# Patient Record
Sex: FEMALE | Race: OTHER | HISPANIC OR LATINO | URBAN - METROPOLITAN AREA
[De-identification: names, ages, dates, MRNs, and addresses within clinical notes are randomized per-mention and may not be internally consistent; named-entity substitution may affect disease eponyms.]

---

## 2021-03-01 ENCOUNTER — OUTPATIENT (OUTPATIENT)
Dept: OUTPATIENT SERVICES | Facility: HOSPITAL | Age: 61
LOS: 1 days | End: 2021-03-01
Payer: MEDICAID

## 2021-03-10 ENCOUNTER — INPATIENT (INPATIENT)
Facility: HOSPITAL | Age: 61
LOS: 4 days | Discharge: HOME HEALTH | DRG: 556 | End: 2021-03-15
Attending: PSYCHIATRY & NEUROLOGY | Admitting: PSYCHIATRY & NEUROLOGY
Payer: MEDICAID

## 2021-03-10 VITALS
TEMPERATURE: 98 F | HEIGHT: 63 IN | OXYGEN SATURATION: 98 % | RESPIRATION RATE: 18 BRPM | HEART RATE: 102 BPM | SYSTOLIC BLOOD PRESSURE: 169 MMHG | WEIGHT: 169.98 LBS | DIASTOLIC BLOOD PRESSURE: 79 MMHG

## 2021-03-10 LAB
A1C WITH ESTIMATED AVERAGE GLUCOSE RESULT: 5.9 % — HIGH (ref 4–5.6)
ALBUMIN SERPL ELPH-MCNC: 3.8 G/DL — SIGNIFICANT CHANGE UP (ref 3.3–5)
ALBUMIN SERPL ELPH-MCNC: 3.9 G/DL — SIGNIFICANT CHANGE UP (ref 3.3–5)
ALP SERPL-CCNC: 101 U/L — SIGNIFICANT CHANGE UP (ref 40–120)
ALP SERPL-CCNC: 97 U/L — SIGNIFICANT CHANGE UP (ref 40–120)
ALT FLD-CCNC: 18 U/L — SIGNIFICANT CHANGE UP (ref 10–45)
ALT FLD-CCNC: SIGNIFICANT CHANGE UP (ref 10–45)
ANION GAP SERPL CALC-SCNC: 7 MMOL/L — SIGNIFICANT CHANGE UP (ref 5–17)
ANION GAP SERPL CALC-SCNC: 9 MMOL/L — SIGNIFICANT CHANGE UP (ref 5–17)
AST SERPL-CCNC: 22 U/L — SIGNIFICANT CHANGE UP (ref 10–40)
AST SERPL-CCNC: SIGNIFICANT CHANGE UP (ref 10–40)
BILIRUB SERPL-MCNC: 0.2 MG/DL — SIGNIFICANT CHANGE UP (ref 0.2–1.2)
BILIRUB SERPL-MCNC: <0.2 MG/DL — SIGNIFICANT CHANGE UP (ref 0.2–1.2)
BLD GP AB SCN SERPL QL: NEGATIVE — SIGNIFICANT CHANGE UP
BUN SERPL-MCNC: 10 MG/DL — SIGNIFICANT CHANGE UP (ref 7–23)
BUN SERPL-MCNC: 9 MG/DL — SIGNIFICANT CHANGE UP (ref 7–23)
CALCIUM SERPL-MCNC: 8.9 MG/DL — SIGNIFICANT CHANGE UP (ref 8.4–10.5)
CALCIUM SERPL-MCNC: 9.2 MG/DL — SIGNIFICANT CHANGE UP (ref 8.4–10.5)
CHLORIDE SERPL-SCNC: 104 MMOL/L — SIGNIFICANT CHANGE UP (ref 96–108)
CHLORIDE SERPL-SCNC: 105 MMOL/L — SIGNIFICANT CHANGE UP (ref 96–108)
CHOLEST SERPL-MCNC: 171 MG/DL — SIGNIFICANT CHANGE UP
CO2 SERPL-SCNC: 24 MMOL/L — SIGNIFICANT CHANGE UP (ref 22–31)
CO2 SERPL-SCNC: 26 MMOL/L — SIGNIFICANT CHANGE UP (ref 22–31)
CREAT SERPL-MCNC: 0.57 MG/DL — SIGNIFICANT CHANGE UP (ref 0.5–1.3)
CREAT SERPL-MCNC: 0.59 MG/DL — SIGNIFICANT CHANGE UP (ref 0.5–1.3)
ESTIMATED AVERAGE GLUCOSE: 123 MG/DL — HIGH (ref 68–114)
GLUCOSE SERPL-MCNC: 144 MG/DL — HIGH (ref 70–99)
GLUCOSE SERPL-MCNC: 161 MG/DL — HIGH (ref 70–99)
HDLC SERPL-MCNC: 58 MG/DL — SIGNIFICANT CHANGE UP
LIPID PNL WITH DIRECT LDL SERPL: 101 MG/DL — HIGH
NON HDL CHOLESTEROL: 113 MG/DL — SIGNIFICANT CHANGE UP
POTASSIUM SERPL-MCNC: 4.4 MMOL/L — SIGNIFICANT CHANGE UP (ref 3.5–5.3)
POTASSIUM SERPL-MCNC: SIGNIFICANT CHANGE UP (ref 3.5–5.3)
POTASSIUM SERPL-SCNC: 4.4 MMOL/L — SIGNIFICANT CHANGE UP (ref 3.5–5.3)
POTASSIUM SERPL-SCNC: SIGNIFICANT CHANGE UP (ref 3.5–5.3)
PROT SERPL-MCNC: 6.7 G/DL — SIGNIFICANT CHANGE UP (ref 6–8.3)
PROT SERPL-MCNC: 6.8 G/DL — SIGNIFICANT CHANGE UP (ref 6–8.3)
RH IG SCN BLD-IMP: POSITIVE — SIGNIFICANT CHANGE UP
SODIUM SERPL-SCNC: 137 MMOL/L — SIGNIFICANT CHANGE UP (ref 135–145)
SODIUM SERPL-SCNC: 138 MMOL/L — SIGNIFICANT CHANGE UP (ref 135–145)
TRIGL SERPL-MCNC: 59 MG/DL — SIGNIFICANT CHANGE UP

## 2021-03-10 PROCEDURE — 99291 CRITICAL CARE FIRST HOUR: CPT

## 2021-03-10 PROCEDURE — 70450 CT HEAD/BRAIN W/O DYE: CPT | Mod: 26,MA,59

## 2021-03-10 PROCEDURE — 70498 CT ANGIOGRAPHY NECK: CPT | Mod: 26,MA

## 2021-03-10 PROCEDURE — 0042T: CPT

## 2021-03-10 PROCEDURE — 93010 ELECTROCARDIOGRAM REPORT: CPT

## 2021-03-10 PROCEDURE — 71275 CT ANGIOGRAPHY CHEST: CPT | Mod: 26,MA

## 2021-03-10 PROCEDURE — 99214 OFFICE O/P EST MOD 30 MIN: CPT

## 2021-03-10 PROCEDURE — 70496 CT ANGIOGRAPHY HEAD: CPT | Mod: 26,MA

## 2021-03-10 PROCEDURE — 71045 X-RAY EXAM CHEST 1 VIEW: CPT | Mod: 26

## 2021-03-10 RX ORDER — SODIUM CHLORIDE 9 MG/ML
1000 INJECTION INTRAMUSCULAR; INTRAVENOUS; SUBCUTANEOUS ONCE
Refills: 0 | Status: COMPLETED | OUTPATIENT
Start: 2021-03-10 | End: 2021-03-10

## 2021-03-10 RX ORDER — ASPIRIN/CALCIUM CARB/MAGNESIUM 324 MG
81 TABLET ORAL ONCE
Refills: 0 | Status: DISCONTINUED | OUTPATIENT
Start: 2021-03-10 | End: 2021-03-10

## 2021-03-10 RX ORDER — CLOPIDOGREL BISULFATE 75 MG/1
75 TABLET, FILM COATED ORAL DAILY
Refills: 0 | Status: DISCONTINUED | OUTPATIENT
Start: 2021-03-10 | End: 2021-03-10

## 2021-03-10 RX ORDER — ACETAMINOPHEN 500 MG
1000 TABLET ORAL ONCE
Refills: 0 | Status: COMPLETED | OUTPATIENT
Start: 2021-03-10 | End: 2021-03-10

## 2021-03-10 RX ORDER — ASPIRIN/CALCIUM CARB/MAGNESIUM 324 MG
243 TABLET ORAL ONCE
Refills: 0 | Status: DISCONTINUED | OUTPATIENT
Start: 2021-03-10 | End: 2021-03-10

## 2021-03-10 RX ORDER — ENOXAPARIN SODIUM 100 MG/ML
40 INJECTION SUBCUTANEOUS EVERY 24 HOURS
Refills: 0 | Status: DISCONTINUED | OUTPATIENT
Start: 2021-03-10 | End: 2021-03-15

## 2021-03-10 RX ORDER — ASPIRIN/CALCIUM CARB/MAGNESIUM 324 MG
300 TABLET ORAL ONCE
Refills: 0 | Status: COMPLETED | OUTPATIENT
Start: 2021-03-10 | End: 2021-03-10

## 2021-03-10 RX ORDER — ATORVASTATIN CALCIUM 80 MG/1
80 TABLET, FILM COATED ORAL AT BEDTIME
Refills: 0 | Status: DISCONTINUED | OUTPATIENT
Start: 2021-03-10 | End: 2021-03-15

## 2021-03-10 RX ORDER — ASPIRIN/CALCIUM CARB/MAGNESIUM 324 MG
81 TABLET ORAL DAILY
Refills: 0 | Status: DISCONTINUED | OUTPATIENT
Start: 2021-03-11 | End: 2021-03-10

## 2021-03-10 RX ORDER — ACETAMINOPHEN 500 MG
650 TABLET ORAL EVERY 6 HOURS
Refills: 0 | Status: DISCONTINUED | OUTPATIENT
Start: 2021-03-10 | End: 2021-03-11

## 2021-03-10 RX ORDER — SODIUM CHLORIDE 9 MG/ML
500 INJECTION INTRAMUSCULAR; INTRAVENOUS; SUBCUTANEOUS ONCE
Refills: 0 | Status: COMPLETED | OUTPATIENT
Start: 2021-03-10 | End: 2021-03-10

## 2021-03-10 RX ORDER — SODIUM CHLORIDE 9 MG/ML
1000 INJECTION INTRAMUSCULAR; INTRAVENOUS; SUBCUTANEOUS
Refills: 0 | Status: DISCONTINUED | OUTPATIENT
Start: 2021-03-10 | End: 2021-03-11

## 2021-03-10 RX ORDER — CLOPIDOGREL BISULFATE 75 MG/1
300 TABLET, FILM COATED ORAL ONCE
Refills: 0 | Status: DISCONTINUED | OUTPATIENT
Start: 2021-03-10 | End: 2021-03-10

## 2021-03-10 RX ADMIN — SODIUM CHLORIDE 500 MILLILITER(S): 9 INJECTION INTRAMUSCULAR; INTRAVENOUS; SUBCUTANEOUS at 16:42

## 2021-03-10 RX ADMIN — ENOXAPARIN SODIUM 40 MILLIGRAM(S): 100 INJECTION SUBCUTANEOUS at 21:55

## 2021-03-10 RX ADMIN — Medication 1000 MILLIGRAM(S): at 16:47

## 2021-03-10 RX ADMIN — SODIUM CHLORIDE 1000 MILLILITER(S): 9 INJECTION INTRAMUSCULAR; INTRAVENOUS; SUBCUTANEOUS at 18:30

## 2021-03-10 RX ADMIN — Medication 400 MILLIGRAM(S): at 22:29

## 2021-03-10 RX ADMIN — Medication 400 MILLIGRAM(S): at 16:19

## 2021-03-10 RX ADMIN — Medication 1000 MILLIGRAM(S): at 16:40

## 2021-03-10 RX ADMIN — Medication 1000 MILLIGRAM(S): at 23:45

## 2021-03-10 NOTE — H&P ADULT - PROBLEM SELECTOR PLAN 10
- F: s/p 1.5L NS bolus in ER  - E: replete K<4, Mg<2  - N: NPO pending S+S eval (failed dysphagia)  - D: lovenox 40mg daily    code: full  dispo: 7L

## 2021-03-10 NOTE — H&P ADULT - PROBLEM SELECTOR PLAN 8
- pt with known hx HTN, on valsartan and norvasc outpt unknown dosing  - med rec in am  - on arrival, /79  - goal SBP <180

## 2021-03-10 NOTE — ED PROVIDER NOTE - CLINICAL SUMMARY MEDICAL DECISION MAKING FREE TEXT BOX
60F with a h/o HTN, asthma, high cholesterol, ?RADHA who presents after outpt septoplasty at [facility] today. Per surgical center and paperwork, pt woke up from anesthesia around 12pm and was complaining of left side chest and arm pain associated with SOB. Pt had an EKG performed with was normal and was given SL nitro and asa with minimal improvement. SHe was noted to have some wheezing b/l and O2 sat of 90 and was given 2 Duonebs with some improvement. She later c/o HA so decision was made to transport pt to the ER. Pt is a poor historian, c/o persistent pain in chest and left upper arm as well as headache,  VS stable, pt on 7L via face mask due to packing in her nose, pt sleepy post anesthesia and left arm strength limited 2/2 pain, plan for labs and CT r/o PE.  Tylenol ordered for pain.   On reeval, pt c/o left sided weakness and stroke code was called.   Dispo pending workup and neuro eval.

## 2021-03-10 NOTE — H&P ADULT - PROBLEM SELECTOR PLAN 4
- pt with chronic HAs but unable to describe typical presentation  - p/w HA prompting ER evaluation  - currently asymptomatic  - continue to monitor

## 2021-03-10 NOTE — ED PROVIDER NOTE - PROGRESS NOTE DETAILS
Pt now complaining of left sided weakness and numbness in addition to headache and left chest and arm pain. She now states she thinks this has been going on since she woke up from her surgery, but she is not sure. Left arm and leg weakness noted, LEft arm strength limited 2/2 pain. Stroke code called Pt now complaining of left sided weakness and numbness in addition to headache and left chest and arm pain. She thinks this might have been going on since she woke up from her surgery at noon, but she is not sure. When she went to surgery center at 730am she reports she was in her normal state of health. Left arm and leg weakness noted, Left arm strength limited 2/2 pain. Stroke code called and pt sent to CT. Neuro request admit for further stroke w/u. asa 81 ordered. ENT paged given recent septoplasty and need for anti-platelet therapy.

## 2021-03-10 NOTE — ED CLERICAL - NS ED CLERK NOTE PRE-ARRIVAL INFORMATION; ADDITIONAL PRE-ARRIVAL INFORMATION
pt is sent from [facility]. pt had an ENT PROCEDURE HAD SOB SHORTLY AFTER AND CHEST PAIN, DID EKG NOTHING FOUND. WANTS HER TO GET FURTHER EVAL DUE TO OXYGEN LEVEL NOT GOING ABOVE 90%.

## 2021-03-10 NOTE — H&P ADULT - PROBLEM SELECTOR PLAN 6
- on arrival, Hb 11.4 with MCV 94.7  - likely source nasal epistaxis in the setting of recent septoplasty  - continue to monitor  - f/u iron studies, folate, b12  - keep active T&S, transfuse Hb<7

## 2021-03-10 NOTE — ED PROVIDER NOTE - PHYSICAL EXAMINATION
GEN: Well appearing, well nourished, sleepy but arousable, awake, alert, oriented to person, place, time/situation and in no apparent distress. NTAF  ENT: Airway patent, Nasal mucosa clear. Mouth with normal mucosa. nasal packing in place.  EYES: Clear bilaterally. PERRL, EOMI  RESPIRATORY: Breathing comfortably with normal RR. No W/C/R, no hypoxia or resp distress.  CARDIAC: Regular rate and rhythm, no M/R/G  ABDOMEN: Soft, nontender, +bowel sounds, no rebound, rigidity, or guarding.  MSK: Range of motion is not limited, no deformities noted.  NEURO: Alert and oriented x 3 but sleepy, Cn 2-12 intact. Strength in LUE limited 2/2 pain. Otherwise Strength 5/5 and sensation intact in all 4 extremities. no pronator drift. FTN normal.  SKIN: Skin normal color for race, warm, dry and intact. No evidence of rash.  PSYCH: Alert and oriented to person, place, time/situation. normal mood and affect. no apparent risk to self or others.

## 2021-03-10 NOTE — H&P ADULT - PROBLEM SELECTOR PLAN 2
- pt presenting s/p septoplasty earlier on day of admission  - nasal packing in place BL nares  - ENT consulted in ER, call in AM to confirm and ask if ok to do DAPT ASA/plavix  - f/u ENT recs

## 2021-03-10 NOTE — H&P ADULT - NSHPPHYSICALEXAM_GEN_ALL_CORE
.  VITAL SIGNS:  T(C): 36.6 (03-10-21 @ 20:15), Max: 36.9 (03-10-21 @ 14:41)  T(F): 97.8 (03-10-21 @ 20:15), Max: 98.4 (03-10-21 @ 14:41)  HR: 78 (03-10-21 @ 20:15) (78 - 102)  BP: 138/64 (03-10-21 @ 20:15) (96/58 - 169/79)  BP(mean): 92 (03-10-21 @ 20:15) (92 - 92)  RR: 20 (03-10-21 @ 20:15) (18 - 20)  SpO2: 100% (03-10-21 @ 20:15) (97% - 100%)  Wt(kg): --    PHYSICAL EXAM:    Constitutional: WDWN resting comfortably in bed; NAD  Head: NC/AT  Eyes: PERRL, EOMI, clear conjunctiva  ENT: no nasal discharge; uvula midline, no oropharyngeal erythema or exudates; MMM  Neck: supple; no JVD or thyromegaly  Respiratory: CTA B/L; no W/R/R, no retractions  Cardiac: +S1/S2; RRR; no M/R/G; PMI non-displaced  Gastrointestinal: soft, NT/ND; no rebound or guarding; +BSx4  Extremities: WWP, no clubbing or cyanosis; no peripheral edema  Musculoskeletal: NROM x4; no joint swelling, tenderness or erythema  Vascular: 2+ radial, femoral, DP/PT pulses B/L  Dermatologic: skin warm, dry and intact; no rashes, wounds, or scars  Lymphatic: no submandibular or cervical LAD  Neurologic: AAOx3; CNII-XII grossly intact; no focal deficits, left pronator drift, mild aphasia.   Psychiatric: affect and characteristics of appearance, verbalizations, behaviors are appropriate Vital Signs Last 24 Hrs  T(C): 36.6 (10 Mar 2021 20:15), Max: 36.9 (10 Mar 2021 14:41)  T(F): 97.8 (10 Mar 2021 20:15), Max: 98.4 (10 Mar 2021 14:41)  HR: 78 (11 Mar 2021 04:30) (75 - 102)  BP: 146/66 (11 Mar 2021 04:30) (96/58 - 169/79)  BP(mean): 95 (11 Mar 2021 04:30) (77 - 95)  RR: 18 (11 Mar 2021 04:30) (18 - 20)  SpO2: 100% (11 Mar 2021 04:30) (97% - 100%)    Physical exam:  General: No acute distress, sleepy but easily arousable; nasal packing in place BL  Cardiovascular: Regular rate and rhythm, no murmurs, rubs, or gallops. No bruits  Pulmonary: Anterior breath sounds clear bilaterally, no crackles or wheezing. No use of accessory muscles  Extremities: Radial and DP pulses +2, no edema    NIHSS 5  Neurologic:  -Mental status: lethargic but arousable; oriented to person, place, and time. Speech with intact naming, repetition, and comprehension, mild dysarthria. Follows commands. Attention/concentration intact. Poor historian.  -Cranial nerves:   II: Visual fields are full to confrontation.  III, IV, VI: Extraocular movements are intact without nystagmus. Pupils equally round and reactive to light  V:  Facial sensation V1-V3 equal and intact   VII: Face is symmetric with normal eye closure and smile  VIII: Hearing is bilaterally intact to finger rub  IX, X: Uvula is midline and soft palate rises symmetrically  XI: Head turning and shoulder shrug are intact.  XII: Tongue protrudes midline  Motor: Normal bulk and tone. Left pronator drift.   Sensation: Intact to light touch bilaterally. No neglect or extinction on double simultaneous testing.  Coordination: No dysmetria on finger-to-nose   Reflexes: Downgoing toes bilaterally

## 2021-03-10 NOTE — H&P ADULT - PROBLEM SELECTOR PLAN 1
- pt p/f outpt surgical center s/p septoplasty with L CP, LUE pain, SOB, HA, and acute onset L sided weakness/numbness   - stroke code with NIHSS 5  - CTH neg, CTP neg, CTA head/neck neg  - f/u MR brain non-con  - goal SBP<180  - failed dysphagia -- f/u S+S recs  - f/u PT/OT/SLP  - A1C 5.9, TSH 0.122, lipid panel with

## 2021-03-10 NOTE — H&P ADULT - HISTORY OF PRESENT ILLNESS
60y Female with PMHx of HTN, asthma, high cholesterol, ?RADHA who presents after outpt septoplasty at [facility] 3/10. Per surgical center and paperwork, pt woke up from anesthesia around 12pm and was complaining of left side chest and arm pain associated with SOB. Pt had an EKG performed with was normal and was given SL nitro and asa with minimal improvement. She was noted to have some wheezing b/l and O2 sat of 90 and was given 2 Duonebs with some improvement. She later c/o HA so decision was made to transport pt to the ER. Pt is a poor historian, c/o persistent pain in chest and left upper arm as well as headache, Report hx of similar headaches in the past, no dizziness, no syncope, no f/c. Patient on arrival was lethargic from anesthesia and not really moving to noxious. Around 16:40 patient was more awake noted to be complaining of headache, on exam noted to have left arm and leg weakness. Stroke code called NIHSS 5. CTH showed no acute infarct or hemorrhage. CTA was negative for large vessel occlusion or stenosis. Patient remains lethargic postop, breathing with non-rebreather mask because of significant nasal packing. Patient poor historian, patient's daughter at bedside also poor historian, unable to obtain patient's PCP, patient's pharmacy, or patient's OR time today. Estimated last known well/ prior to patient's surgery likely 10am. Patient's surgeon Rudy Blackmon [phone] called from [facility], left a message.    Pt is a 61 yo F with PMH HTN, HLD, asthma, ?RADHA, and HA who p/f outpt surgical center after septoplasty with L CP, LUE pain, SOB since waking from anesthesia. Underwent septoplasty at [facility] and woke from anesthesia around 12pm with LUE pain, L CP, SOB with EKG WNL and given SL nitro/ASA with mild improvement in symptoms; noted to have BL wheezing and given 2x duonebs with improvement. Respiratory status complicated by  Pt is a 61 yo F with PMH HTN, HLD, asthma, ?RADHA, and HA who p/f outpt surgical center after septoplasty with L CP, LUE pain, SOB since waking from anesthesia. Underwent septoplasty at [facility] and woke from anesthesia around 12pm with LUE pain, L CP, SOB with EKG WNL and given SL nitro/ASA with mild improvement in symptoms; noted to have BL wheezing and given 2x duonebs with improvement. Respiratory status complicated by nasal packing from post-op. Pt then began to develop HA and decision made to transport pt to ER. Of note, pt and pt's daughter both poor historians and information limited. On arrival to [facility], pt complaining of new L sided weakness/numbness prompting stroke code.    On arrival, T 98.4, , /79, RR 18 O2sat 98% RA. EKG with NSR without ischemic changes. Labs with WBC 10.34, 94% neutrophils, Hb 11.4, MCV 94.7, lipid panel with , TSH 0.122, A1C 5.9, trop neg, BNP neg. Stroke code with NIHSS 5. CTH neg, CTA neg, CTP neg, CTA PE neg. Admitted to  for further observation and management.

## 2021-03-10 NOTE — H&P ADULT - NSHPLABSRESULTS_GEN_ALL_CORE
.  LABS:                         11.4   10.34 )-----------( 219      ( 10 Mar 2021 15:34 )             35.8     03-10    137  |  104  |  10  ----------------------------<  161<H>  See Note   |  24  |  0.59    Ca    8.9      10 Mar 2021 17:24    TPro  6.8  /  Alb  3.9  /  TBili  <0.2  /  DBili  x   /  AST  See Note  /  ALT  See Note  /  AlkPhos  97  03-10        CARDIAC MARKERS ( 10 Mar 2021 15:34 )  x     / 0.01 ng/mL / x     / x     / x          Serum Pro-Brain Natriuretic Peptide: 120 pg/mL (03-10 @ 15:34)        RADIOLOGY, EKG & ADDITIONAL TESTS: Reviewed. LABS:                         11.4   10.34 )-----------( 219      ( 10 Mar 2021 15:34 )             35.8     03-10    137  |  104  |  10  ----------------------------<  161<H>  See Note   |  24  |  0.59    Ca    8.9      10 Mar 2021 17:24    TPro  6.8  /  Alb  3.9  /  TBili  <0.2  /  DBili  x   /  AST  See Note  /  ALT  See Note  /  AlkPhos  97  03-10        CARDIAC MARKERS ( 10 Mar 2021 15:34 )  x     / 0.01 ng/mL / x     / x     / x          Serum Pro-Brain Natriuretic Peptide: 120 pg/mL (03-10 @ 15:34)        RADIOLOGY, EKG & ADDITIONAL TESTS: Reviewed.

## 2021-03-10 NOTE — ED PROVIDER NOTE - CARE PLAN
Principal Discharge DX:	Left arm weakness  Secondary Diagnosis:	Headache  Secondary Diagnosis:	Asthma

## 2021-03-10 NOTE — ED PROVIDER NOTE - OBJECTIVE STATEMENT
60F with a h/o HTN, asthma, high cholesterol, on gabapentin for ?chronic pain who presents after outpt septoplasty at [facility] today for 60F with a h/o HTN, asthma, high cholesterol, ?RADHA who presents after outpt septoplasty at [facility] today. Per surgical center and paperwork, pt woke up from anesthesia around 12pm and was complaining of left side chest and arm pain associated with SOB. Pt had an EKG performed with was normal and was given SL nitro and asa with minimal improvement. SHe was noted to have some wheezing b/l and O2 sat of 90 and was given 2 Duonebs with some improvement. She later c/o HA so decision was made to transport pt to the ER. Pt is a poor historian, c/o persistent pain in chest and left upper arm as well as headache, Report hx of similar headaches in the past, no dizziness, no syncope, no f/c. No other complaints at this time.

## 2021-03-10 NOTE — H&P ADULT - PROBLEM SELECTOR PLAN 3
- at [facility], pt with O2sat 90% on RA likely in the setting of nasal packing  - c/w NRB and wean as tolerated  - CXR neg  - exam without signs of respiratory distress  - continue to monitor

## 2021-03-10 NOTE — CONSULT NOTE ADULT - ASSESSMENT
HPI: 60y Female with PMHx of HTN, asthma, high cholesterol, ?RADHA who presents after outpt septoplasty at [facility] 3/10. Per surgical center and paperwork, pt woke up from anesthesia around 12pm and was complaining of left side chest and arm pain associated with SOB. Pt had an EKG performed with was normal and was given SL nitro and asa with minimal improvement. She was noted to have some wheezing b/l and O2 sat of 90 and was given 2 Duonebs with some improvement. She later c/o HA so decision was made to transport pt to the ER. Pt is a poor historian, c/o persistent pain in chest and left upper arm as well as headache, Report hx of similar headaches in the past, no dizziness, no syncope, no f/c. Patient on arrival was lethargic from anesthesia and not really moving to noxious. Around 16:40 patient was more awake noted to be complaining of headache, on exam noted to have left arm and leg weakness. Stroke code called NIHSS 5. CTH showed no acute infarct or hemorrhage. CTA was negative for large vessel occlusion or stenosis. Patient remains lethargic postop, breathing with non-rebreather mask because of significant nasal packing. Patient poor historian, patient's daughter at bedside also poor historian, unable to obtain patient's PCP, patient's pharmacy, or patient's OR time today. Patient's surgeon Rudy Blackmon [phone] called from [facility], left a message.    HPI: 60y Female with PMHx of HTN, asthma, high cholesterol, ?RADHA who presents after outpt septoplasty at [facility] 3/10 c/o left side chest and arm pain associated with SOB. Patient on arrival to [facility] ED was lethargic from anesthesia and not really moving to noxious. Around 16:40 patient was more awake noted to be complaining of headache, on exam noted to have left arm and leg weakness. Stroke code called NIHSS 5. CTH showed no acute infarct or hemorrhage. CTA was negative for large vessel occlusion or stenosis. TPA not given as patient with unclear last known well and recent surgery. Patient remains lethargic postop, breathing with non-rebreather mask because of significant nasal packing. ENT consulted for risk of bleed on dapt with recent septoplasty, given risk benefits patient ok for dapt however patient failed dysphagia screen so on rectal asa only. Admitted for further stroke workup.    - Recommend stroke neuro checks every 4 hours   - Repeat HCT for acute changes in neuro exam  - Obtain MRI Brain without contrast  - no need for echo for now   - Goal SBP < 180  - Bedside Dysphagia Screen- failed  - PT/OT/SLP   - Obtain Hemoglobin A1C, Lipid Profile Panel, and TSH  - appreciate ENT recs  - f/u with Rudy Blackmon [phone] called from [facility], left a message.     Secondary Stroke Prevention Medication  - recommend asa and plavix however given failed dysphagia screen use asa 300mg rectal  - start asa 81mg and plavix 75mg daily if patient able to swallow  - hold atorvastatin 80mg PO daily- cholesterol and stroke prevention   - Start lovenox SQ and SCDs for DVT prophylaxis

## 2021-03-10 NOTE — CONSULT NOTE ADULT - SUBJECTIVE AND OBJECTIVE BOX
**STROKE CODE CONSULT NOTE**    Last known well time/Time of onset of symptoms: 3/10/2021, likely 10am     HPI: 60y Female with PMHx of HTN, asthma, high cholesterol, ?RADHA who presents after outpt septoplasty at [facility] 3/10. Per surgical center and paperwork, pt woke up from anesthesia around 12pm and was complaining of left side chest and arm pain associated with SOB. Pt had an EKG performed with was normal and was given SL nitro and asa with minimal improvement. She was noted to have some wheezing b/l and O2 sat of 90 and was given 2 Duonebs with some improvement. She later c/o HA so decision was made to transport pt to the ER. Pt is a poor historian, c/o persistent pain in chest and left upper arm as well as headache, Report hx of similar headaches in the past, no dizziness, no syncope, no f/c. Patient on arrival was lethagAround 16:40 patient was noted to be complaining of headache and left arm and leg weakness      T(C): 36.3 (03-10-21 @ 19:15), Max: 36.9 (03-10-21 @ 14:41)  HR: 82 (03-10-21 @ 19:15) (80 - 102)  BP: 111/62 (03-10-21 @ 19:15) (96/58 - 169/79)  RR: 18 (03-10-21 @ 17:54) (18 - 18)  SpO2: 98% (03-10-21 @ 17:54) (97% - 98%)    PAST MEDICAL & SURGICAL HISTORY:      FAMILY HISTORY:      SOCIAL HISTORY:    ROS: ***  Constitutional: No fever, weight loss or fatigue  Eyes: No eye pain, visual disturbances, or discharge  ENMT:  No difficulty hearing, tinnitus, vertigo; No sinus or throat pain  Neck: No pain or stiffness  Respiratory: No cough, wheezing, chills or hemoptysis  Cardiovascular: No chest pain, palpitations, shortness of breath, dizziness or leg swelling  Gastrointestinal: No abdominal pain. No nausea, vomiting or hematemesis; No diarrhea or constipation. Nohematochezia.  Genitourinary: No dysuria, frequency, hematuria or incontinence  Neurological: As per HPI  Skin: No itching, burning, rashes or lesions   Endocrine: No heat or cold intolerance; No hair loss  Musculoskeletal: No joint pain or swelling; No muscle, back or extremity pain  Psychiatric: No depression, anxiety, mood swings or difficulty sleeping  Heme/Lymph: No easy bruising or bleeding gums    MEDICATIONS  (STANDING):  aspirin 243 milliGRAM(s) Oral once  aspirin  chewable 81 milliGRAM(s) Oral once  aspirin enteric coated 81 milliGRAM(s) Oral daily  atorvastatin 80 milliGRAM(s) Oral at bedtime  clopidogrel Tablet 75 milliGRAM(s) Oral daily  clopidogrel Tablet 300 milliGRAM(s) Oral once  sodium chloride 0.9%. 1000 milliLiter(s) (125 mL/Hr) IV Continuous <Continuous>    MEDICATIONS  (PRN):    Allergies    apple (Unknown)  No Known Drug Allergies    Intolerances      Vital Signs Last 24 Hrs  T(C): 36.3 (10 Mar 2021 19:15), Max: 36.9 (10 Mar 2021 14:41)  T(F): 97.3 (10 Mar 2021 19:15), Max: 98.4 (10 Mar 2021 14:41)  HR: 82 (10 Mar 2021 19:15) (80 - 102)  BP: 111/62 (10 Mar 2021 19:15) (96/58 - 169/79)  BP(mean): --  RR: 18 (10 Mar 2021 17:54) (18 - 18)  SpO2: 98% (10 Mar 2021 17:54) (97% - 98%)    Physical exam:  Constitutional: No acute distress, conversant  Eyes: Anicteric sclerae, moist conjunctivae, see below for CNs  Neck: trachea midline, FROM, supple, no thyromegaly or lymphadenopathy  Cardiovascular: Regular rate and rhythm, no murmurs, rubs, or gallops. No carotid bruits.   Pulmonary: Anterior breath sounds clear bilaterally, no crackles or wheezing. No use of accessory muscles  GI: Abdomen soft, non-distended, non-tender  Extremities: Radial and DP pulses +2, no edema    Neurologic:  -Mental status: Awake, alert, oriented to person, place, and time. Speech is fluent with intact naming, repetition, and comprehension, no dysarthria. Recent and remote memory intact. Follows commands. Attention/concentration intact. Fund of knowledge appropriate.  -Cranial nerves:   II: Visual fields are full to confrontation.  III, IV, VI: Extraocular movements are intact without nystagmus. Pupils equally round and reactive to light  V:  Facial sensation V1-V3 equal and intact   VII: Face is symmetric with normal eye closure and smile  VIII: Hearing is bilaterally intact to finger rub  IX, X: Uvula is midline and soft palate rises symmetrically  XI: Head turning and shoulder shrug are intact.  XII: Tongue protrudes midline  Motor: Normal bulk and tone. No pronator drift. Strength bilateral upper extremity 5/5, bilateral lower extremities 5/5.  Rapid alternating movements intact and symmetric  Sensation: Intact to light touch bilaterally. No neglect or extinction on double simultaneous testing.  Coordination: No dysmetria on finger-to-nose and heel-to-shin bilaterally  Reflexes: Downgoing toes bilaterally   Gait: Narrow gait and steady    NIHSS: **** ASPECT Score: ******* ICH Score: ******    Fingerstick Blood Glucose: CAPILLARY BLOOD GLUCOSE  136 (10 Mar 2021 19:18)      POCT Blood Glucose.: 136 mg/dL (10 Mar 2021 17:11)    LABS:                        11.4   10.34 )-----------( 219      ( 10 Mar 2021 15:34 )             35.8     03-10    137  |  104  |  10  ----------------------------<  161<H>  See Note   |  24  |  0.59    Ca    8.9      10 Mar 2021 17:24    TPro  6.8  /  Alb  3.9  /  TBili  <0.2  /  DBili  x   /  AST  See Note  /  ALT  See Note  /  AlkPhos  97  03-10      CARDIAC MARKERS ( 10 Mar 2021 15:34 )  x     / 0.01 ng/mL / x     / x     / x              RADIOLOGY & ADDITIONAL STUDIES:      -----------------------------------------------------------------------------------------------------------------  IV-tPA (Y/N):    ***                              Bolus time:  Reason IV-tPA not given: ***    **STROKE CODE CONSULT NOTE**    Last known well time/Time of onset of symptoms: 3/10/2021, likely 10am     HPI: 60y Female with PMHx of HTN, asthma, high cholesterol, ?RADHA who presents after outpt septoplasty at [facility] 3/10. Per surgical center and paperwork, pt woke up from anesthesia around 12pm and was complaining of left side chest and arm pain associated with SOB. Pt had an EKG performed with was normal and was given SL nitro and asa with minimal improvement. She was noted to have some wheezing b/l and O2 sat of 90 and was given 2 Duonebs with some improvement. She later c/o HA so decision was made to transport pt to the ER. Pt is a poor historian, c/o persistent pain in chest and left upper arm as well as headache, Report hx of similar headaches in the past, no dizziness, no syncope, no f/c. Patient on arrival was lethargic from anesthesia and not really moving to noxious. Around 16:40 patient was more awake noted to be complaining of headache, on exam noted to have left arm and leg weakness. Stroke code called NIHSS 5. CTH showed no acute infarct or hemorrhage. CTA was negative for large vessel occlusion or stenosis. Patient remains lethargic postop, breathing with non-rebreather mask because of significant nasal packing.       T(C): 36.3 (03-10-21 @ 19:15), Max: 36.9 (03-10-21 @ 14:41)  HR: 82 (03-10-21 @ 19:15) (80 - 102)  BP: 111/62 (03-10-21 @ 19:15) (96/58 - 169/79)  RR: 18 (03-10-21 @ 17:54) (18 - 18)  SpO2: 98% (03-10-21 @ 17:54) (97% - 98%)    PAST MEDICAL & SURGICAL HISTORY:      FAMILY HISTORY:      SOCIAL HISTORY:    ROS: ***  Constitutional: No fever, weight loss or fatigue  Eyes: No eye pain, visual disturbances, or discharge  ENMT:  No difficulty hearing, tinnitus, vertigo; No sinus or throat pain  Neck: No pain or stiffness  Respiratory: No cough, wheezing, chills or hemoptysis  Cardiovascular: No chest pain, palpitations, shortness of breath, dizziness or leg swelling  Gastrointestinal: No abdominal pain. No nausea, vomiting or hematemesis; No diarrhea or constipation. Nohematochezia.  Genitourinary: No dysuria, frequency, hematuria or incontinence  Neurological: As per HPI  Skin: No itching, burning, rashes or lesions   Endocrine: No heat or cold intolerance; No hair loss  Musculoskeletal: No joint pain or swelling; No muscle, back or extremity pain  Psychiatric: No depression, anxiety, mood swings or difficulty sleeping  Heme/Lymph: No easy bruising or bleeding gums    MEDICATIONS  (STANDING):  aspirin 243 milliGRAM(s) Oral once  aspirin  chewable 81 milliGRAM(s) Oral once  aspirin enteric coated 81 milliGRAM(s) Oral daily  atorvastatin 80 milliGRAM(s) Oral at bedtime  clopidogrel Tablet 75 milliGRAM(s) Oral daily  clopidogrel Tablet 300 milliGRAM(s) Oral once  sodium chloride 0.9%. 1000 milliLiter(s) (125 mL/Hr) IV Continuous <Continuous>    MEDICATIONS  (PRN):    Allergies    apple (Unknown)  No Known Drug Allergies    Intolerances      Vital Signs Last 24 Hrs  T(C): 36.3 (10 Mar 2021 19:15), Max: 36.9 (10 Mar 2021 14:41)  T(F): 97.3 (10 Mar 2021 19:15), Max: 98.4 (10 Mar 2021 14:41)  HR: 82 (10 Mar 2021 19:15) (80 - 102)  BP: 111/62 (10 Mar 2021 19:15) (96/58 - 169/79)  BP(mean): --  RR: 18 (10 Mar 2021 17:54) (18 - 18)  SpO2: 98% (10 Mar 2021 17:54) (97% - 98%)    Physical exam:  Constitutional: No acute distress, conversant  Eyes: Anicteric sclerae, moist conjunctivae, see below for CNs  Neck: trachea midline, FROM, supple, no thyromegaly or lymphadenopathy  Cardiovascular: Regular rate and rhythm, no murmurs, rubs, or gallops. No carotid bruits.   Pulmonary: Anterior breath sounds clear bilaterally, no crackles or wheezing. No use of accessory muscles  GI: Abdomen soft, non-distended, non-tender  Extremities: Radial and DP pulses +2, no edema    Neurologic:  -Mental status: Awake, alert, oriented to person, place, and time. Speech is fluent with intact naming, repetition, and comprehension, no dysarthria. Recent and remote memory intact. Follows commands. Attention/concentration intact. Fund of knowledge appropriate.  -Cranial nerves:   II: Visual fields are full to confrontation.  III, IV, VI: Extraocular movements are intact without nystagmus. Pupils equally round and reactive to light  V:  Facial sensation V1-V3 equal and intact   VII: Face is symmetric with normal eye closure and smile  VIII: Hearing is bilaterally intact to finger rub  IX, X: Uvula is midline and soft palate rises symmetrically  XI: Head turning and shoulder shrug are intact.  XII: Tongue protrudes midline  Motor: Normal bulk and tone. No pronator drift. Strength bilateral upper extremity 5/5, bilateral lower extremities 5/5.  Rapid alternating movements intact and symmetric  Sensation: Intact to light touch bilaterally. No neglect or extinction on double simultaneous testing.  Coordination: No dysmetria on finger-to-nose and heel-to-shin bilaterally  Reflexes: Downgoing toes bilaterally   Gait: Narrow gait and steady    NIHSS: **** ASPECT Score: ******* ICH Score: ******    Fingerstick Blood Glucose: CAPILLARY BLOOD GLUCOSE  136 (10 Mar 2021 19:18)      POCT Blood Glucose.: 136 mg/dL (10 Mar 2021 17:11)    LABS:                        11.4   10.34 )-----------( 219      ( 10 Mar 2021 15:34 )             35.8     03-10    137  |  104  |  10  ----------------------------<  161<H>  See Note   |  24  |  0.59    Ca    8.9      10 Mar 2021 17:24    TPro  6.8  /  Alb  3.9  /  TBili  <0.2  /  DBili  x   /  AST  See Note  /  ALT  See Note  /  AlkPhos  97  03-10      CARDIAC MARKERS ( 10 Mar 2021 15:34 )  x     / 0.01 ng/mL / x     / x     / x              RADIOLOGY & ADDITIONAL STUDIES:  < from: CT Brain Stroke Protocol (03.10.21 @ 17:36) >  Minor modification: There are scattered punctate cortical calcifications which may be stigmata of prior granulomatous infection or possible neurocysticercosis if the patient is native to an endemic area. These are currently of doubtful clinical significance without surrounding edema or mass effect.    No pneumocephalus is present in this patient status post septoplasty. There is expected mixed density of packing material and hemorrhage inthe inferior nasal cavities and also there is left asymmetric ethmoid sinus opacification. On coronal and sagittal 3 mm images, there is no obvious skull base defect.    < end of copied text >  < from: CT Brain Stroke Protocol (03.10.21 @ 17:36) >  No acute intracranial hemorrhage or territorial infarction.    Status post septoplasty with postoperative changes, as above.    < from: CT Angio Head w/ IV Cont (03.10.21 @ 17:37) >    IMPRESSION: Unremarkable CTA of the brain.    < end of copied text >  < from: CT Angio Head w/ IV Cont (03.10.21 @ 17:37) >  IMPRESSION:    No carotid or vertebral artery steno-occlusive disease or dissection injury in the neck.    < end of copied text >      -----------------------------------------------------------------------------------------------------------------  IV-tPA (Y/N):  no  Reason IV-tPA not given: out of window    **STROKE CODE CONSULT NOTE**    Last known well time/Time of onset of symptoms: 3/10/2021, likely 10am     HPI: 60y Female with PMHx of HTN, asthma, high cholesterol, ?RADHA who presents after outpt septoplasty at [facility] 3/10. Per surgical center and paperwork, pt woke up from anesthesia around 12pm and was complaining of left side chest and arm pain associated with SOB. Pt had an EKG performed with was normal and was given SL nitro and asa with minimal improvement. She was noted to have some wheezing b/l and O2 sat of 90 and was given 2 Duonebs with some improvement. She later c/o HA so decision was made to transport pt to the ER. Pt is a poor historian, c/o persistent pain in chest and left upper arm as well as headache, Report hx of similar headaches in the past, no dizziness, no syncope, no f/c. Patient on arrival was lethargic from anesthesia and not really moving to noxious. Around 16:40 patient was more awake noted to be complaining of headache, on exam noted to have left arm and leg weakness. Stroke code called NIHSS 5. CTH showed no acute infarct or hemorrhage. CTA was negative for large vessel occlusion or stenosis. Patient remains lethargic postop, breathing with non-rebreather mask because of significant nasal packing. Patient poor historian, patient's daughter at bedside also poor historian, unable to obtain patient's PCP, patient's pharmacy, or patient's OR time today. Patient's surgeon Rudy Blackmon [phone] called from [facility], left a message.       T(C): 36.3 (03-10-21 @ 19:15), Max: 36.9 (03-10-21 @ 14:41)  HR: 82 (03-10-21 @ 19:15) (80 - 102)  BP: 111/62 (03-10-21 @ 19:15) (96/58 - 169/79)  RR: 18 (03-10-21 @ 17:54) (18 - 18)  SpO2: 98% (03-10-21 @ 17:54) (97% - 98%)    PAST MEDICAL & SURGICAL HISTORY:      FAMILY HISTORY:      SOCIAL HISTORY:    MEDICATIONS  (STANDING):  aspirin 243 milliGRAM(s) Oral once  aspirin  chewable 81 milliGRAM(s) Oral once  aspirin enteric coated 81 milliGRAM(s) Oral daily  atorvastatin 80 milliGRAM(s) Oral at bedtime  clopidogrel Tablet 75 milliGRAM(s) Oral daily  clopidogrel Tablet 300 milliGRAM(s) Oral once  sodium chloride 0.9%. 1000 milliLiter(s) (125 mL/Hr) IV Continuous <Continuous>    MEDICATIONS  (PRN):    Allergies    apple (Unknown)  No Known Drug Allergies    Intolerances      Vital Signs Last 24 Hrs  T(C): 36.3 (10 Mar 2021 19:15), Max: 36.9 (10 Mar 2021 14:41)  T(F): 97.3 (10 Mar 2021 19:15), Max: 98.4 (10 Mar 2021 14:41)  HR: 82 (10 Mar 2021 19:15) (80 - 102)  BP: 111/62 (10 Mar 2021 19:15) (96/58 - 169/79)  BP(mean): --  RR: 18 (10 Mar 2021 17:54) (18 - 18)  SpO2: 98% (10 Mar 2021 17:54) (97% - 98%)    Physical exam:  Neurologic:  -Mental status: Stuporous, oriented to person, place, and time. Speech with intact naming, repetition, and comprehension, mild dysarthria. Follows commands.  -Cranial nerves:   II: Visual fields are full to confrontation.  III, IV, VI: Extraocular movements are intact without nystagmus. Pupils equally round and reactive to light  V:  Facial sensation V1-V3 equal and intact   VII: Face is symmetric with normal eye closure and smile  Motor: Normal bulk and tone. left pronator drift.     Sensation: Intact to light touch bilaterally. No neglect or extinction on double simultaneous testing.  Coordination: No dysmetria on finger-to-nose    NIHSS: 5 ASPECT Score: 10    Fingerstick Blood Glucose: CAPILLARY BLOOD GLUCOSE  136 (10 Mar 2021 19:18)      POCT Blood Glucose.: 136 mg/dL (10 Mar 2021 17:11)    LABS:                        11.4   10.34 )-----------( 219      ( 10 Mar 2021 15:34 )             35.8     03-10    137  |  104  |  10  ----------------------------<  161<H>  See Note   |  24  |  0.59    Ca    8.9      10 Mar 2021 17:24    TPro  6.8  /  Alb  3.9  /  TBili  <0.2  /  DBili  x   /  AST  See Note  /  ALT  See Note  /  AlkPhos  97  03-10      CARDIAC MARKERS ( 10 Mar 2021 15:34 )  x     / 0.01 ng/mL / x     / x     / x              RADIOLOGY & ADDITIONAL STUDIES:  < from: CT Brain Stroke Protocol (03.10.21 @ 17:36) >  Minor modification: There are scattered punctate cortical calcifications which may be stigmata of prior granulomatous infection or possible neurocysticercosis if the patient is native to an endemic area. These are currently of doubtful clinical significance without surrounding edema or mass effect.    No pneumocephalus is present in this patient status post septoplasty. There is expected mixed density of packing material and hemorrhage inthe inferior nasal cavities and also there is left asymmetric ethmoid sinus opacification. On coronal and sagittal 3 mm images, there is no obvious skull base defect.    < end of copied text >  < from: CT Brain Stroke Protocol (03.10.21 @ 17:36) >  No acute intracranial hemorrhage or territorial infarction.    Status post septoplasty with postoperative changes, as above.    < from: CT Angio Head w/ IV Cont (03.10.21 @ 17:37) >    IMPRESSION: Unremarkable CTA of the brain.    < end of copied text >  < from: CT Angio Head w/ IV Cont (03.10.21 @ 17:37) >  IMPRESSION:    No carotid or vertebral artery steno-occlusive disease or dissection injury in the neck.    < end of copied text >      -----------------------------------------------------------------------------------------------------------------  IV-tPA (Y/N):  no  Reason IV-tPA not given: out of window    **STROKE CODE CONSULT NOTE**    Last known well time/Time of onset of symptoms: 3/10/2021, likely 10am     HPI: 60y Female with PMHx of HTN, asthma, high cholesterol, ?RADHA who presents after outpt septoplasty at [facility] 3/10. Per surgical center and paperwork, pt woke up from anesthesia around 12pm and was complaining of left side chest and arm pain associated with SOB. Pt had an EKG performed with was normal and was given SL nitro and asa with minimal improvement. She was noted to have some wheezing b/l and O2 sat of 90 and was given 2 Duonebs with some improvement. She later c/o HA so decision was made to transport pt to the ER. Pt is a poor historian, c/o persistent pain in chest and left upper arm as well as headache, Report hx of similar headaches in the past, no dizziness, no syncope, no f/c. Patient on arrival was lethargic from anesthesia and not really moving to noxious. Around 16:40 patient was more awake noted to be complaining of headache, on exam noted to have left arm and leg weakness. Stroke code called NIHSS 5. CTH showed no acute infarct or hemorrhage. CTA was negative for large vessel occlusion or stenosis. Patient remains lethargic postop, breathing with non-rebreather mask because of significant nasal packing. Patient poor historian, patient's daughter at bedside also poor historian, unable to obtain patient's PCP, patient's pharmacy, or patient's OR time today. Estimated last known well/ prior to patient's surgery likely 10am. Patient's surgeon Rudy Blackmon [phone] called from [facility], left a message.       T(C): 36.3 (03-10-21 @ 19:15), Max: 36.9 (03-10-21 @ 14:41)  HR: 82 (03-10-21 @ 19:15) (80 - 102)  BP: 111/62 (03-10-21 @ 19:15) (96/58 - 169/79)  RR: 18 (03-10-21 @ 17:54) (18 - 18)  SpO2: 98% (03-10-21 @ 17:54) (97% - 98%)    PAST MEDICAL & SURGICAL HISTORY:      FAMILY HISTORY:      SOCIAL HISTORY:    MEDICATIONS  (STANDING):  aspirin 243 milliGRAM(s) Oral once  aspirin  chewable 81 milliGRAM(s) Oral once  aspirin enteric coated 81 milliGRAM(s) Oral daily  atorvastatin 80 milliGRAM(s) Oral at bedtime  clopidogrel Tablet 75 milliGRAM(s) Oral daily  clopidogrel Tablet 300 milliGRAM(s) Oral once  sodium chloride 0.9%. 1000 milliLiter(s) (125 mL/Hr) IV Continuous <Continuous>    MEDICATIONS  (PRN):    Allergies    apple (Unknown)  No Known Drug Allergies    Intolerances      Vital Signs Last 24 Hrs  T(C): 36.3 (10 Mar 2021 19:15), Max: 36.9 (10 Mar 2021 14:41)  T(F): 97.3 (10 Mar 2021 19:15), Max: 98.4 (10 Mar 2021 14:41)  HR: 82 (10 Mar 2021 19:15) (80 - 102)  BP: 111/62 (10 Mar 2021 19:15) (96/58 - 169/79)  BP(mean): --  RR: 18 (10 Mar 2021 17:54) (18 - 18)  SpO2: 98% (10 Mar 2021 17:54) (97% - 98%)    Physical exam:  Neurologic:  -Mental status: Stuporous, oriented to person, place, and time. Speech with intact naming, repetition, and comprehension, mild dysarthria. Follows commands.  -Cranial nerves:   II: Visual fields are full to confrontation.  III, IV, VI: Extraocular movements are intact without nystagmus. Pupils equally round and reactive to light  V:  Facial sensation V1-V3 equal and intact   VII: Face is symmetric with normal eye closure and smile  Motor: Normal bulk and tone. left pronator drift.     Sensation: Intact to light touch bilaterally. No neglect or extinction on double simultaneous testing.  Coordination: No dysmetria on finger-to-nose    NIHSS: 5 ASPECT Score: 10    Fingerstick Blood Glucose: CAPILLARY BLOOD GLUCOSE  136 (10 Mar 2021 19:18)      POCT Blood Glucose.: 136 mg/dL (10 Mar 2021 17:11)    LABS:                        11.4   10.34 )-----------( 219      ( 10 Mar 2021 15:34 )             35.8     03-10    137  |  104  |  10  ----------------------------<  161<H>  See Note   |  24  |  0.59    Ca    8.9      10 Mar 2021 17:24    TPro  6.8  /  Alb  3.9  /  TBili  <0.2  /  DBili  x   /  AST  See Note  /  ALT  See Note  /  AlkPhos  97  03-10      CARDIAC MARKERS ( 10 Mar 2021 15:34 )  x     / 0.01 ng/mL / x     / x     / x              RADIOLOGY & ADDITIONAL STUDIES:  < from: CT Brain Stroke Protocol (03.10.21 @ 17:36) >  Minor modification: There are scattered punctate cortical calcifications which may be stigmata of prior granulomatous infection or possible neurocysticercosis if the patient is native to an endemic area. These are currently of doubtful clinical significance without surrounding edema or mass effect.    No pneumocephalus is present in this patient status post septoplasty. There is expected mixed density of packing material and hemorrhage inthe inferior nasal cavities and also there is left asymmetric ethmoid sinus opacification. On coronal and sagittal 3 mm images, there is no obvious skull base defect.    < end of copied text >  < from: CT Brain Stroke Protocol (03.10.21 @ 17:36) >  No acute intracranial hemorrhage or territorial infarction.    Status post septoplasty with postoperative changes, as above.    < from: CT Angio Head w/ IV Cont (03.10.21 @ 17:37) >    IMPRESSION: Unremarkable CTA of the brain.    < end of copied text >  < from: CT Angio Head w/ IV Cont (03.10.21 @ 17:37) >  IMPRESSION:    No carotid or vertebral artery steno-occlusive disease or dissection injury in the neck.    < end of copied text >      -----------------------------------------------------------------------------------------------------------------  IV-tPA (Y/N):  no  Reason IV-tPA not given: out of window

## 2021-03-10 NOTE — ED ADULT NURSE REASSESSMENT NOTE - NS ED NURSE REASSESS COMMENT FT1
DISCUSSED PO MEDS WITH NEURO HERACLIO IRBY 2* FAILING DYSPHAGIA SCREEN.  MEDS TO BE DC'd/CHANGED TO ROUTE OTHER THAN PO  COMMUNICATED TO RECEIVING UNIT 5LACH RN AS PT IS BEING TRANSFERRED TO RECEIVING INPATIENT UNIT

## 2021-03-11 DIAGNOSIS — E78.5 HYPERLIPIDEMIA, UNSPECIFIED: ICD-10-CM

## 2021-03-11 DIAGNOSIS — I10 ESSENTIAL (PRIMARY) HYPERTENSION: ICD-10-CM

## 2021-03-11 DIAGNOSIS — R68.89 OTHER GENERAL SYMPTOMS AND SIGNS: ICD-10-CM

## 2021-03-11 DIAGNOSIS — R51.9 HEADACHE, UNSPECIFIED: ICD-10-CM

## 2021-03-11 DIAGNOSIS — R63.8 OTHER SYMPTOMS AND SIGNS CONCERNING FOOD AND FLUID INTAKE: ICD-10-CM

## 2021-03-11 DIAGNOSIS — D64.9 ANEMIA, UNSPECIFIED: ICD-10-CM

## 2021-03-11 DIAGNOSIS — R09.02 HYPOXEMIA: ICD-10-CM

## 2021-03-11 DIAGNOSIS — J45.909 UNSPECIFIED ASTHMA, UNCOMPLICATED: ICD-10-CM

## 2021-03-11 LAB
A1C WITH ESTIMATED AVERAGE GLUCOSE RESULT: 5.8 % — HIGH (ref 4–5.6)
ANION GAP SERPL CALC-SCNC: 6 MMOL/L — SIGNIFICANT CHANGE UP (ref 5–17)
APTT BLD: 31.4 SEC — SIGNIFICANT CHANGE UP (ref 27.5–35.5)
BLD GP AB SCN SERPL QL: NEGATIVE — SIGNIFICANT CHANGE UP
BUN SERPL-MCNC: 9 MG/DL — SIGNIFICANT CHANGE UP (ref 7–23)
CALCIUM SERPL-MCNC: 9.3 MG/DL — SIGNIFICANT CHANGE UP (ref 8.4–10.5)
CHLORIDE SERPL-SCNC: 108 MMOL/L — SIGNIFICANT CHANGE UP (ref 96–108)
CHOLEST SERPL-MCNC: 170 MG/DL — SIGNIFICANT CHANGE UP
CO2 SERPL-SCNC: 26 MMOL/L — SIGNIFICANT CHANGE UP (ref 22–31)
CREAT SERPL-MCNC: 0.57 MG/DL — SIGNIFICANT CHANGE UP (ref 0.5–1.3)
ESTIMATED AVERAGE GLUCOSE: 120 MG/DL — HIGH (ref 68–114)
FERRITIN SERPL-MCNC: 51 NG/ML — SIGNIFICANT CHANGE UP (ref 15–150)
FOLATE SERPL-MCNC: 9.6 NG/ML — SIGNIFICANT CHANGE UP
GLUCOSE SERPL-MCNC: 119 MG/DL — HIGH (ref 70–99)
HCT VFR BLD CALC: 33.7 % — LOW (ref 34.5–45)
HCT VFR BLD CALC: 36.5 % — SIGNIFICANT CHANGE UP (ref 34.5–45)
HCV AB S/CO SERPL IA: 0.06 S/CO — SIGNIFICANT CHANGE UP
HCV AB SERPL-IMP: SIGNIFICANT CHANGE UP
HDLC SERPL-MCNC: 53 MG/DL — SIGNIFICANT CHANGE UP
HGB BLD-MCNC: 10.7 G/DL — LOW (ref 11.5–15.5)
HGB BLD-MCNC: 11.5 G/DL — SIGNIFICANT CHANGE UP (ref 11.5–15.5)
INR BLD: 1.02 — SIGNIFICANT CHANGE UP (ref 0.88–1.16)
IRON SATN MFR SERPL: 19 % — SIGNIFICANT CHANGE UP (ref 14–50)
IRON SATN MFR SERPL: 62 UG/DL — SIGNIFICANT CHANGE UP (ref 30–160)
LIPID PNL WITH DIRECT LDL SERPL: 98 MG/DL — SIGNIFICANT CHANGE UP
MAGNESIUM SERPL-MCNC: 2 MG/DL — SIGNIFICANT CHANGE UP (ref 1.6–2.6)
MCHC RBC-ENTMCNC: 30.1 PG — SIGNIFICANT CHANGE UP (ref 27–34)
MCHC RBC-ENTMCNC: 30.2 PG — SIGNIFICANT CHANGE UP (ref 27–34)
MCHC RBC-ENTMCNC: 31.5 GM/DL — LOW (ref 32–36)
MCHC RBC-ENTMCNC: 31.8 GM/DL — LOW (ref 32–36)
MCV RBC AUTO: 95.2 FL — SIGNIFICANT CHANGE UP (ref 80–100)
MCV RBC AUTO: 95.5 FL — SIGNIFICANT CHANGE UP (ref 80–100)
NON HDL CHOLESTEROL: 117 MG/DL — SIGNIFICANT CHANGE UP
NRBC # BLD: 0 /100 WBCS — SIGNIFICANT CHANGE UP (ref 0–0)
NRBC # BLD: 0 /100 WBCS — SIGNIFICANT CHANGE UP (ref 0–0)
PHOSPHATE SERPL-MCNC: 3.6 MG/DL — SIGNIFICANT CHANGE UP (ref 2.5–4.5)
PLATELET # BLD AUTO: 232 K/UL — SIGNIFICANT CHANGE UP (ref 150–400)
PLATELET # BLD AUTO: 257 K/UL — SIGNIFICANT CHANGE UP (ref 150–400)
POTASSIUM SERPL-MCNC: 4.4 MMOL/L — SIGNIFICANT CHANGE UP (ref 3.5–5.3)
POTASSIUM SERPL-SCNC: 4.4 MMOL/L — SIGNIFICANT CHANGE UP (ref 3.5–5.3)
PROTHROM AB SERPL-ACNC: 12.2 SEC — SIGNIFICANT CHANGE UP (ref 10.6–13.6)
RBC # BLD: 3.54 M/UL — LOW (ref 3.8–5.2)
RBC # BLD: 3.82 M/UL — SIGNIFICANT CHANGE UP (ref 3.8–5.2)
RBC # FLD: 12.5 % — SIGNIFICANT CHANGE UP (ref 10.3–14.5)
RBC # FLD: 12.7 % — SIGNIFICANT CHANGE UP (ref 10.3–14.5)
RH IG SCN BLD-IMP: POSITIVE — SIGNIFICANT CHANGE UP
SODIUM SERPL-SCNC: 140 MMOL/L — SIGNIFICANT CHANGE UP (ref 135–145)
T3 SERPL-MCNC: 79 NG/DL — LOW (ref 80–200)
T4 AB SER-ACNC: 9.57 UG/DL — SIGNIFICANT CHANGE UP (ref 3.17–11.72)
T4 FREE SERPL-MCNC: 1.07 NG/DL — SIGNIFICANT CHANGE UP (ref 0.7–1.48)
TIBC SERPL-MCNC: 326 UG/DL — SIGNIFICANT CHANGE UP (ref 220–430)
TRANSFERRIN SERPL-MCNC: 265 MG/DL — SIGNIFICANT CHANGE UP (ref 200–360)
TRIGL SERPL-MCNC: 93 MG/DL — SIGNIFICANT CHANGE UP
TSH SERPL-MCNC: 0.12 UIU/ML — LOW (ref 0.35–4.94)
TSH SERPL-MCNC: 0.15 UIU/ML — LOW (ref 0.35–4.94)
UIBC SERPL-MCNC: 264 UG/DL — SIGNIFICANT CHANGE UP (ref 110–370)
VIT B12 SERPL-MCNC: 496 PG/ML — SIGNIFICANT CHANGE UP (ref 232–1245)
WBC # BLD: 11.98 K/UL — HIGH (ref 3.8–10.5)
WBC # BLD: 12 K/UL — HIGH (ref 3.8–10.5)
WBC # FLD AUTO: 11.98 K/UL — HIGH (ref 3.8–10.5)
WBC # FLD AUTO: 12 K/UL — HIGH (ref 3.8–10.5)

## 2021-03-11 PROCEDURE — 99223 1ST HOSP IP/OBS HIGH 75: CPT

## 2021-03-11 PROCEDURE — 93306 TTE W/DOPPLER COMPLETE: CPT | Mod: 26

## 2021-03-11 RX ORDER — MORPHINE SULFATE 50 MG/1
1 CAPSULE, EXTENDED RELEASE ORAL ONCE
Refills: 0 | Status: DISCONTINUED | OUTPATIENT
Start: 2021-03-11 | End: 2021-03-11

## 2021-03-11 RX ORDER — ASPIRIN/CALCIUM CARB/MAGNESIUM 324 MG
81 TABLET ORAL DAILY
Refills: 0 | Status: DISCONTINUED | OUTPATIENT
Start: 2021-03-11 | End: 2021-03-14

## 2021-03-11 RX ORDER — ACETAMINOPHEN 500 MG
1000 TABLET ORAL ONCE
Refills: 0 | Status: COMPLETED | OUTPATIENT
Start: 2021-03-11 | End: 2021-03-11

## 2021-03-11 RX ORDER — CLOPIDOGREL BISULFATE 75 MG/1
75 TABLET, FILM COATED ORAL DAILY
Refills: 0 | Status: DISCONTINUED | OUTPATIENT
Start: 2021-03-12 | End: 2021-03-14

## 2021-03-11 RX ORDER — CLOPIDOGREL BISULFATE 75 MG/1
300 TABLET, FILM COATED ORAL ONCE
Refills: 0 | Status: COMPLETED | OUTPATIENT
Start: 2021-03-11 | End: 2021-03-11

## 2021-03-11 RX ORDER — SODIUM CHLORIDE 9 MG/ML
1000 INJECTION INTRAMUSCULAR; INTRAVENOUS; SUBCUTANEOUS
Refills: 0 | Status: DISCONTINUED | OUTPATIENT
Start: 2021-03-11 | End: 2021-03-13

## 2021-03-11 RX ORDER — OXYMETAZOLINE HYDROCHLORIDE 0.5 MG/ML
1 SPRAY NASAL EVERY 12 HOURS
Refills: 0 | Status: DISCONTINUED | OUTPATIENT
Start: 2021-03-11 | End: 2021-03-15

## 2021-03-11 RX ORDER — MORPHINE SULFATE 50 MG/1
0.5 CAPSULE, EXTENDED RELEASE ORAL ONCE
Refills: 0 | Status: DISCONTINUED | OUTPATIENT
Start: 2021-03-11 | End: 2021-03-11

## 2021-03-11 RX ADMIN — MORPHINE SULFATE 0.5 MILLIGRAM(S): 50 CAPSULE, EXTENDED RELEASE ORAL at 22:11

## 2021-03-11 RX ADMIN — SODIUM CHLORIDE 125 MILLILITER(S): 9 INJECTION INTRAMUSCULAR; INTRAVENOUS; SUBCUTANEOUS at 02:26

## 2021-03-11 RX ADMIN — MORPHINE SULFATE 1 MILLIGRAM(S): 50 CAPSULE, EXTENDED RELEASE ORAL at 10:20

## 2021-03-11 RX ADMIN — Medication 81 MILLIGRAM(S): at 11:19

## 2021-03-11 RX ADMIN — Medication 1000 MILLIGRAM(S): at 19:00

## 2021-03-11 RX ADMIN — Medication 1000 MILLIGRAM(S): at 18:21

## 2021-03-11 RX ADMIN — Medication 400 MILLIGRAM(S): at 04:50

## 2021-03-11 RX ADMIN — Medication 1000 MILLIGRAM(S): at 05:30

## 2021-03-11 RX ADMIN — MORPHINE SULFATE 1 MILLIGRAM(S): 50 CAPSULE, EXTENDED RELEASE ORAL at 10:02

## 2021-03-11 RX ADMIN — SODIUM CHLORIDE 100 MILLILITER(S): 9 INJECTION INTRAMUSCULAR; INTRAVENOUS; SUBCUTANEOUS at 20:22

## 2021-03-11 RX ADMIN — OXYMETAZOLINE HYDROCHLORIDE 1 SPRAY(S): 0.5 SPRAY NASAL at 18:46

## 2021-03-11 RX ADMIN — MORPHINE SULFATE 0.5 MILLIGRAM(S): 50 CAPSULE, EXTENDED RELEASE ORAL at 22:30

## 2021-03-11 RX ADMIN — CLOPIDOGREL BISULFATE 300 MILLIGRAM(S): 75 TABLET, FILM COATED ORAL at 11:19

## 2021-03-11 RX ADMIN — ATORVASTATIN CALCIUM 80 MILLIGRAM(S): 80 TABLET, FILM COATED ORAL at 22:11

## 2021-03-11 NOTE — PROGRESS NOTE ADULT - PROBLEM SELECTOR PLAN 5
- on arrival, TSH 0.122  - f/u FT4, T3 On admission, initial labs notable for TSH 0.122.  - F/u free T4

## 2021-03-11 NOTE — CHART NOTE - NSCHARTNOTEFT_GEN_A_CORE
Goals reviewed at interdisciplinary rounds with case management, social work, physical therapy, occupational therapy, and speech language pathology.    Please see specific therapy  notes for in depth goals.    Highlights of goals are:    Patient will:   Physical therapy  [] remain on bedrest  [] get out of bed to chair [] ambulate with assistance [x] ambulate without assistance  []today       [x] by discharge    Occupational therapy  [] N/a, independent [x] balance training  []go from supine to seated independently [] balance sitting at the edge of the bed to do grooming task []stand at sink to perform grooming task  [] dress self   [] gain strength to feed self  [] other:  []today         [x]by discharge    Speech therapy  [] N/a, no speech deficit [] vocalize [] respond to yes/no questions given cues with 80% accuracy [] name common objects [] express basic needs/wants [x] improve enunciation of words and phrases []other:  []today        [x] by discharge      Swallow therapy  [x] tolerate regular diet [] remain NPO, receive oral care to minimize aspirating bacteria mouth bacteria [] tolerate pureed diet  [] tolerate mechanical soft diet  [] tolerate tube feeds [] tolerate thin liquids [] tolerate nectar thick liquids [] other:  [] with assistance  [x] today       [] by discharge    Patient in need of further stroke workup, likely home pending left sided weakness

## 2021-03-11 NOTE — PHYSICAL THERAPY INITIAL EVALUATION ADULT - MANUAL MUSCLE TESTING RESULTS, REHAB EVAL
RUE~5/5 t/o, LUE~4-/5 except L elbow extension~3+/5, RLE~5/5 t/o, LLE~4-/5 except L hip flexion~3-/5, hip abduciton~3-/5 t/o.

## 2021-03-11 NOTE — PROGRESS NOTE ADULT - PROBLEM SELECTOR PLAN 7
- pt with known hx asthma  - does not appear to be in acute exacerbation  - continue to monitor Patient with known hx of asthma and RADHA (CPAP at bedtime). On admission, lungs CTA without wheezing.  - Currently satting well on RA  - CPAP at bedtime

## 2021-03-11 NOTE — PROGRESS NOTE ADULT - PROBLEM SELECTOR PLAN 3
- at [facility], pt with O2sat 90% on RA likely in the setting of nasal packing  - c/w NRB and wean as tolerated  - CXR neg  - exam without signs of respiratory distress  - continue to monitor Patient presenting with SOB and hypoxic to low 90s on RA after procedural anesthesia s/p septoplasty. Hypoxia likely in setting of b/l nasal packing in place. Placed on NRB with improvement in O2 sat, now transitioned to RA. CXR without evidence of new consolidation/infiltrate.   - Now off O2 on RA

## 2021-03-11 NOTE — PHYSICAL THERAPY INITIAL EVALUATION ADULT - GAIT DEVIATIONS NOTED, PT EVAL
slightly unsteady gait, no lose of balance, decreased heel strike and hip flexion on LLE during ambulation. Ambulation is limited secondary pt's nasal packing increased saturated with blood. and ENT present to fix the nasal packing./decreased dejuan/increased time in double stance/decreased step length

## 2021-03-11 NOTE — PHYSICAL THERAPY INITIAL EVALUATION ADULT - MODALITIES TREATMENT COMMENTS
CN Testing: B/L Frontalis intact; smile symmetrical; tongue protrusion at midline; B/L eyes open/close intact; Shoulder elevation: intact bilaterally; Vision H-Test: bilateral tracking and smooth pursuit intact; Convergence/Divergence: intact; Vision Quadrant Test: intact bilaterally except slight impairment on L visual field on L eye.

## 2021-03-11 NOTE — SPEECH LANGUAGE PATHOLOGY EVALUATION - SLP CONVERSATIONAL SPEECH
Able to construct sentences with appropriate semantics and syntax. Content was logical with an appropriate level of detail.

## 2021-03-11 NOTE — SPEECH LANGUAGE PATHOLOGY EVALUATION - SLP DIAGNOSIS
Pt p/w receptive and expressive language skills WFL on the conversational level c/w pt/family reports. Therapeutic intervention is not required at this time.

## 2021-03-11 NOTE — SPEECH LANGUAGE PATHOLOGY EVALUATION - COMMENTS
Per RN, pt has been able to express wants/needs without difficulty. Pt stated that she is bilingual English-Macedonian, with Macedonian being her primary language. Per daughter, pt's communication skills on the conversational level are consistent with her skills prior to this hospitalization. Consult for clinical bedside swallow eval also received. Per RN, pt passed the nursing dysphagia screen, therefore, full swallow eval was not completed. If there are any concerns w pt's ability to tolerate a PO diet, pls reconsult this SVC. Pt denied difficulty w  receptive language skills Pt denied difficulty w expressive language skills

## 2021-03-11 NOTE — PROGRESS NOTE ADULT - SUBJECTIVE AND OBJECTIVE BOX
**Incomplete Note**    INTERVAL HPI/OVERNIGHT EVENTS:    SUBJECTIVE:   Patient seen and examined at bedside.    OBJECTIVE:    VITAL SIGNS:  ICU Vital Signs Last 24 Hrs  T(C): 36.2 (11 Mar 2021 04:30), Max: 36.9 (10 Mar 2021 14:41)  T(F): 97.2 (11 Mar 2021 04:30), Max: 98.4 (10 Mar 2021 14:41)  HR: 78 (11 Mar 2021 04:30) (75 - 102)  BP: 146/66 (11 Mar 2021 04:30) (96/58 - 169/79)  BP(mean): 95 (11 Mar 2021 04:30) (77 - 95)  RR: 18 (11 Mar 2021 04:30) (18 - 20)  SpO2: 100% (11 Mar 2021 04:30) (97% - 100%)        03-10 @ 07:01  -  03-11 @ 06:52  --------------------------------------------------------  IN: 975 mL / OUT: 600 mL / NET: 375 mL      CAPILLARY BLOOD GLUCOSE  136 (10 Mar 2021 19:18)      POCT Blood Glucose.: 136 mg/dL (10 Mar 2021 17:11)      PHYSICAL EXAM:    General: NAD; Lying comfortably in bed  HEENT: NC/AT; PERRL, clear conjunctiva; MMM  Neck: Supple. No JVD or thyromegaly   Respiratory: CTA b/l. No wheezes, rhonchi, rales.  Cardiovascular: +S1/S2; RRR; No murmurs, rubs, gallops.  Abdomen: soft, NT/ND; +BS x4  Extremities: WWP, 2+ peripheral pulses b/l; no LE edema  Skin: normal color and turgor; no rash  Neurological: AOx3    MEDICATIONS:  MEDICATIONS  (STANDING):  atorvastatin 80 milliGRAM(s) Oral at bedtime  enoxaparin Injectable 40 milliGRAM(s) SubCutaneous every 24 hours  sodium chloride 0.9%. 1000 milliLiter(s) (125 mL/Hr) IV Continuous <Continuous>    MEDICATIONS  (PRN):      ALLERGIES:  Allergies    apple (Unknown)  No Known Drug Allergies    Intolerances        LABS:                        11.5   12.00 )-----------( 257      ( 11 Mar 2021 06:05 )             36.5     03-11    140  |  108  |  9   ----------------------------<  119<H>  4.4   |  26  |  0.57    Ca    9.3      11 Mar 2021 06:05  Phos  3.6     03-11  Mg     2.0     03-11    TPro  6.7  /  Alb  3.8  /  TBili  0.2  /  DBili  x   /  AST  22  /  ALT  18  /  AlkPhos  101  03-10    PT/INR - ( 11 Mar 2021 06:05 )   PT: 12.2 sec;   INR: 1.02          PTT - ( 11 Mar 2021 06:05 )  PTT:31.4 sec      RADIOLOGY & ADDITIONAL TESTS: Reviewed. INTERVAL HPI/OVERNIGHT EVENTS:  Patient admitted overnight to [facility].     SUBJECTIVE:   Patient seen and examined at bedside. Complaining of discomfort with nasal packing.     OBJECTIVE:    VITAL SIGNS:  ICU Vital Signs Last 24 Hrs  T(C): 36.2 (11 Mar 2021 04:30), Max: 36.9 (10 Mar 2021 14:41)  T(F): 97.2 (11 Mar 2021 04:30), Max: 98.4 (10 Mar 2021 14:41)  HR: 78 (11 Mar 2021 04:30) (75 - 102)  BP: 146/66 (11 Mar 2021 04:30) (96/58 - 169/79)  BP(mean): 95 (11 Mar 2021 04:30) (77 - 95)  RR: 18 (11 Mar 2021 04:30) (18 - 20)  SpO2: 100% (11 Mar 2021 04:30) (97% - 100%)        03-10 @ 07:01  -  03-11 @ 06:52  --------------------------------------------------------  IN: 975 mL / OUT: 600 mL / NET: 375 mL      CAPILLARY BLOOD GLUCOSE  136 (10 Mar 2021 19:18)      POCT Blood Glucose.: 136 mg/dL (10 Mar 2021 17:11)      PHYSICAL EXAM:    General: NAD; Lying comfortably in bed  HEENT: NC/AT; PERRL, clear conjunctiva; MMM; nasal packing in place with some blood  Neck: Supple. No JVD or thyromegaly   Respiratory: CTA b/l. No wheezes, rhonchi, rales.  Cardiovascular: +S1/S2; RRR; No murmurs, rubs, gallops.  Abdomen: soft, NT/ND; +BS x4  Extremities: WWP, 2+ peripheral pulses b/l; no LE edema  Skin: normal color and turgor; no rash  Neurological: AOx3. CN 2-12 grossly intact.    MEDICATIONS:  MEDICATIONS  (STANDING):  atorvastatin 80 milliGRAM(s) Oral at bedtime  enoxaparin Injectable 40 milliGRAM(s) SubCutaneous every 24 hours  sodium chloride 0.9%. 1000 milliLiter(s) (125 mL/Hr) IV Continuous <Continuous>    MEDICATIONS  (PRN):      ALLERGIES:  Allergies    apple (Unknown)  No Known Drug Allergies    Intolerances        LABS:                        11.5   12.00 )-----------( 257      ( 11 Mar 2021 06:05 )             36.5     03-11    140  |  108  |  9   ----------------------------<  119<H>  4.4   |  26  |  0.57    Ca    9.3      11 Mar 2021 06:05  Phos  3.6     03-11  Mg     2.0     03-11    TPro  6.7  /  Alb  3.8  /  TBili  0.2  /  DBili  x   /  AST  22  /  ALT  18  /  AlkPhos  101  03-10    PT/INR - ( 11 Mar 2021 06:05 )   PT: 12.2 sec;   INR: 1.02          PTT - ( 11 Mar 2021 06:05 )  PTT:31.4 sec      RADIOLOGY & ADDITIONAL TESTS: Reviewed.

## 2021-03-11 NOTE — PROGRESS NOTE ADULT - PROBLEM SELECTOR PLAN 4
- pt with chronic HAs but unable to describe typical presentation  - p/w HA prompting ER evaluation  - currently asymptomatic  - continue to monitor Patient with hx of chronic headache.   - Pain control PRN

## 2021-03-11 NOTE — CONSULT NOTE ADULT - SUBJECTIVE AND OBJECTIVE BOX
RAYNA NEGRON  60y  Female    Patient is a 60y old  Female who presents with a chief complaint of CP, SOB, L side weakness (11 Mar 2021 11:00)      HPI:  Pt is a 61 yo F with PMH HTN, HLD, asthma, ?RADHA, and HA who p/f outpt surgical center after septoplasty with L CP, LUE pain, SOB since waking from anesthesia. Underwent septoplasty at [facility] and woke from anesthesia around 12pm with LUE pain, L CP, SOB with EKG WNL and given SL nitro/ASA with mild improvement in symptoms; noted to have BL wheezing and given 2x duonebs with improvement. Respiratory status complicated by nasal packing from post-op. Pt then began to develop HA and decision made to transport pt to ER. Of note, pt and pt's daughter both poor historians and information limited. On arrival to [facility], pt complaining of new L sided weakness/numbness prompting stroke code.    On arrival, T 98.4, , /79, RR 18 O2sat 98% RA. EKG with NSR without ischemic changes. Labs with WBC 10.34, 94% neutrophils, Hb 11.4, MCV 94.7, lipid panel with , TSH 0.122, A1C 5.9, trop neg, BNP neg. Stroke code with NIHSS 5. CTH neg, CTA neg, CTP neg, CTA PE neg. Admitted to  for further observation and management. (10 Mar 2021 20:12)    Seen by me this morning, feeling a bit better, but still states feeling weak and with mild headache/dizziness. Denies chest pain or SOB. Improved weakness/numbness of LUE and LLE. No new neurologic deficits.    PAST MEDICAL & SURGICAL HISTORY:  Asthma    HLD (hyperlipidemia)    HTN (hypertension)    Sleep apnea    Home Medications:  atorvastatin:  (10 Mar 2021 17:36)  gabapentin:  (10 Mar 2021 17:36)  montelukast:  (10 Mar 2021 17:36)  Norvasc:  (10 Mar 2021 17:36)  pregabalin:  (10 Mar 2021 17:36)  valsartan:  (10 Mar 2021 17:36)      60y    FAMILY HISTORY:  No h/o CVA in 1st degree relatives  +CAD: sister    Marital Status:  (  X  )    (   ) Single    (   )    (  )   Lives with: (  ) alone  (  ) children   ( X ) spouse   (  ) parents  (  ) other  Recent Travel: No recent travel  Occupation:    Substance Use (street drugs): ( x ) never used  (  ) other:  Tobacco Usage:  ( x  ) never smoked   (   ) former smoker   (   ) current smoker  (     ) pack year  Alcohol Usage: Occasional alcohol use      MEDICATIONS  (STANDING):  aspirin  chewable 81 milliGRAM(s) Oral daily  atorvastatin 80 milliGRAM(s) Oral at bedtime  enoxaparin Injectable 40 milliGRAM(s) SubCutaneous every 24 hours  oxymetazoline 0.05% Nasal Spray 1 Spray(s) Both Nostrils every 12 hours    MEDICATIONS  (PRN):    REVIEW OF SYSTEMS:  As HPI otherwise negative    Vital Signs Last 24 Hrs  T(C): 36.6 (11 Mar 2021 13:55), Max: 36.9 (11 Mar 2021 10:18)  T(F): 97.9 (11 Mar 2021 13:55), Max: 98.4 (11 Mar 2021 10:18)  HR: 77 (11 Mar 2021 17:20) (70 - 82)  BP: 159/73 (11 Mar 2021 17:20) (111/62 - 167/59)  BP(mean): 105 (11 Mar 2021 17:20) (77 - 105)  RR: 16 (11 Mar 2021 14:42) (16 - 20)  SpO2: 99% (11 Mar 2021 17:20) (97% - 100%)    PHYSICAL EXAM:  GENERAL: NAD, well-groomed, well-developed  HEAD:  Atraumatic, Normocephalic  EYES: EOMI, PERRLA, conjunctiva and sclera clear  ENMT: +Packing noted on B/L nostrils, some blood noted on packing  NECK: Supple, No JVD, Normal thyroid  NERVOUS SYSTEM:  Alert & Oriented X3, Good concentration; Motor Strength 5/5 B/L upper and lower extremities  CHEST/LUNG: Clear to percussion bilaterally; No rales, rhonchi, wheezing, or rubs  HEART: Regular rate and rhythm; No murmurs, rubs, or gallops  ABDOMEN: Soft, Nontender, Nondistended; Bowel sounds present  EXTREMITIES:  2+ Peripheral Pulses, No clubbing, cyanosis, or edema  LYMPH: No lymphadenopathy noted  SKIN: No rashes or lesions    Consultant(s) Notes Reviewed:  [x ] YES  [ ] NO  Care Discussed with Consultants/Other Providers [ x] YES  [ ] NO    LABS:                        11.5   12.00 )-----------( 257      ( 11 Mar 2021 06:05 )             36.5     03-11    140  |  108  |  9   ----------------------------<  119<H>  4.4   |  26  |  0.57    Ca    9.3      11 Mar 2021 06:05  Phos  3.6     03-11  Mg     2.0     03-11    TPro  6.7  /  Alb  3.8  /  TBili  0.2  /  DBili  x   /  AST  22  /  ALT  18  /  AlkPhos  101  03-10    PT/INR - ( 11 Mar 2021 06:05 )   PT: 12.2 sec;   INR: 1.02          PTT - ( 11 Mar 2021 06:05 )  PTT:31.4 sec    CAPILLARY BLOOD GLUCOSE  136 (10 Mar 2021 19:18)            RADIOLOGY & ADDITIONAL TESTS:  Reviewed by me

## 2021-03-11 NOTE — PROGRESS NOTE ADULT - PROBLEM SELECTOR PLAN 9
- pt with known hx HLD, on atorvastatin outpt  - med rec in AM Patient with known hx of HLD. Home medications include atorvastatin.  - Atorvastatin 80mg Qhs

## 2021-03-11 NOTE — CONSULT NOTE ADULT - SUBJECTIVE AND OBJECTIVE BOX
60F w/ hx HTN, HLD, asthma here with CP and SOB following septoplasty at [facility] 3/10; ENT consulted for packing removal. Pt states overall she feels better since coming in to hospital. Some oozing around packing.     PMH: as above  PSx: 3/10/21: septoplasty, BITR, devon bullosa resection, with b/l telfa packs and L merocel in place    PE:  Gen: NAD until examined  Nose: b/l packing in place, saturated in dried blood, see below  OC/OP: minimal oozing down posterior OP  Neck: supple  Resp: NLB on RA    Procedure: control of epistaxis  R telfa pack removed. Oozing of blood noted from septum. Hemostasis achieved with surgifoam. Pt re-examined 1 hour later, mustache dressing noted to be saturated x2. Afrin soaked pledget introduced into R nare. Telfa pack and merocel removed from L nare. Afrin soaked pledget introduced into left nare. Nasal cavities again examined. B/l inflamed inferior turbinates appreciated. Significant bleeding appreciated from L with additional bleeding from R. B/l nares packed with surgicel wrapped surgifoam. However pt continued to bleed. B/l nares packed with additional 8cm merocel b/l. Mustache dressing applied. Hemostasis achieved. Minimal bleeding down L posterior OP noted. Mustache dressing applied    A/P:  60F here with post-op epistaxis. Currently with absorbable surgicel/surgifoam and non-absorbable merocel b/l.   -Please have afrin available at bedside  -HOB elevated 30 deg  -Please keep NPO overnight   -Replace mustache dressing as needed. Notify ENT if needing to be replaced more frequently than q1hour  -Pain control  -BP control   -AC/AP as per primary  -Page ENT with questions/concerns

## 2021-03-11 NOTE — PROGRESS NOTE ADULT - PROBLEM SELECTOR PLAN 6
- on arrival, Hb 11.4 with MCV 94.7  - likely source nasal epistaxis in the setting of recent septoplasty  - continue to monitor  - f/u iron studies, folate, b12  - keep active T&S, transfuse Hb<7 On admission, initial labs notable for Hb 11.4 with MCV 94.7. Normocytic anemia likely in setting of nasal epistaxis in the setting of recent septoplasty.   - ENT consulted to assess nasal packing left post-procedure  - Keep active T/S  - Transfuse for Hb <7

## 2021-03-11 NOTE — PROGRESS NOTE ADULT - PROBLEM SELECTOR PLAN 10
- F: s/p 1.5L NS bolus in ER  - E: replete K<4, Mg<2  - N: NPO pending S+S eval (failed dysphagia)  - D: lovenox 40mg daily    code: full  dispo: 7L F: None  E: Replete PRN  N: Diet, DASH/TLC  DVT ppx: Lovenox  GI ppx: None  Bowel: None  Dispo: 7LA    FULL CODE

## 2021-03-11 NOTE — PHYSICAL THERAPY INITIAL EVALUATION ADULT - PERTINENT HX OF CURRENT PROBLEM, REHAB EVAL
Pt is a 61 yo female presenting to [facility] from [facility] s/p septoplasty complaining of chest pain, LUE pain, and shortness of breath after procedural anesthesia. New onset L-sided weakness/numbness on arrival prompting stroke code with NIHSS 5 however negative imaging. Patient is being admitted to [facility] for further management.

## 2021-03-11 NOTE — PROGRESS NOTE ADULT - PROBLEM SELECTOR PLAN 2
- pt presenting s/p septoplasty earlier on day of admission  - nasal packing in place BL nares  - ENT consulted in ER, call in AM to confirm and ask if ok to do DAPT ASA/plavix  - f/u ENT recs Patient presenting s/p septoplasty at [facility] day of admission with nasal packing in B/L nares.  - ENT consulted, recs appreciated

## 2021-03-11 NOTE — PROGRESS NOTE ADULT - ASSESSMENT
Pt is a 59 yo F with PMH HTN, HLD, asthma, ?RADHA, and HA who p/f outpt surgical center after septoplasty with L CP, LUE pain, SOB since waking from anesthesia after septoplasty. On arrival to [facility], pt complaining of new L sided weakness/numbness prompting stroke code with NIHSS 5, neg imaging pending MR brain. Admitted to  for further observation and management.   59 yo F with PMHx HTN, HLD, asthma, RADHA (CPAP at night) presenting to [facility] from [facility] s/p septoplasty complaining of chest pain, LUE pain, and shortness of breath after procedural anesthesia. New onset L-sided weakness/numbness on arrival prompting stroke code with NIHSS 5 however negative imaging. Patient is being admitted to [facility] for further management.

## 2021-03-11 NOTE — PHYSICAL THERAPY INITIAL EVALUATION ADULT - FOLLOWS COMMANDS/ANSWERS QUESTIONS, REHAB EVAL
mild dysarthria/100% of the time/able to follow multistep instructions/able to follow single-step instructions

## 2021-03-11 NOTE — PHYSICAL THERAPY INITIAL EVALUATION ADULT - ADDITIONAL COMMENTS
Pt lives with spouse in an apt, denies stair. Prior to admission, pt ambulated independently without AD.

## 2021-03-11 NOTE — PROGRESS NOTE ADULT - PROBLEM SELECTOR PLAN 1
- pt p/f outpt surgical center s/p septoplasty with L CP, LUE pain, SOB, HA, and acute onset L sided weakness/numbness   - stroke code with NIHSS 5  - CTH neg, CTP neg, CTA head/neck neg  - f/u MR brain non-con  - goal SBP<180  - failed dysphagia -- f/u S+S recs  - f/u PT/OT/SLP  - A1C 5.9, TSH 0.122, lipid panel with  Patient presenting from [facility] s/p septoplasty with complaints of chest pain, shortness of breath, new onset L-sided weakness/numbness. NIHSS 5 on arrival with negative CTH, CTP, CTA head/neck.   - MR brain (ordered)  - sBP goal <180  - Passed bedside dysphagia  - ASA 81mg   - Atorvastatin 80mg Qhs  - F/u PT/OT

## 2021-03-11 NOTE — PHYSICAL THERAPY INITIAL EVALUATION ADULT - GENERAL OBSERVATIONS, REHAB EVAL
Pt received semi supine in bed with +IV, +EKG, nasal packing saturated with blood, NAD, daughter present. pt left as found, NAD, call bell in reach, line intact, nasal packing with increased saturation with blood, ENT team present.

## 2021-03-11 NOTE — CONSULT NOTE ADULT - ASSESSMENT
60 year old female with,    # LUE pain/left side weakness: occurring post-op, imaging non revealing so far for CVA. Care per Stroke. F/up Brain MRI and GABINO. TTE with EF 65-70%. Mild MR. No e/o R to L shunt. C/w ASA/plavix/statin. PT/OT evals --> inpatient rehab pending further imaging/progress. DVT PPx with LMWH.    # HTN: Goal SBP per Stroke <200. Holding home meds: valsartan/amlodipine. Accurate med rec to be done.    # Asthma: not in exacerbation at this time. Can resume singulair. Nebs as needed.    # S/p septoplasty 3/10: ENT called to follow up on patient, await further recs. C/w afrin spray.    # Sleep apnea: C/w CPAP at bedtime.     # Leukocytosis: likely reactive post-op. Follow up CBC.    # Hyperlipidemia: c/w statin.     # Low TSH: with normal FT4/T4. Recommend to repeat TFT's in 4 weeks as outpatient.    # Pre-diabetes: with A1C of 5.9. Monitor BS for now.    # BMI of 31.7 --> Obesity.    Thanks for the consult. D/w Stroke Team, will continue to follow up with you.  .

## 2021-03-12 LAB
ANION GAP SERPL CALC-SCNC: 9 MMOL/L — SIGNIFICANT CHANGE UP (ref 5–17)
BUN SERPL-MCNC: 12 MG/DL — SIGNIFICANT CHANGE UP (ref 7–23)
CALCIUM SERPL-MCNC: 8.6 MG/DL — SIGNIFICANT CHANGE UP (ref 8.4–10.5)
CHLORIDE SERPL-SCNC: 109 MMOL/L — HIGH (ref 96–108)
CK MB CFR SERPL CALC: 3 NG/ML — SIGNIFICANT CHANGE UP (ref 0–6.7)
CK SERPL-CCNC: 126 U/L — SIGNIFICANT CHANGE UP (ref 25–170)
CO2 SERPL-SCNC: 25 MMOL/L — SIGNIFICANT CHANGE UP (ref 22–31)
CREAT SERPL-MCNC: 0.58 MG/DL — SIGNIFICANT CHANGE UP (ref 0.5–1.3)
GLUCOSE SERPL-MCNC: 96 MG/DL — SIGNIFICANT CHANGE UP (ref 70–99)
HCT VFR BLD CALC: 33.9 % — LOW (ref 34.5–45)
HGB BLD-MCNC: 10.8 G/DL — LOW (ref 11.5–15.5)
MAGNESIUM SERPL-MCNC: 2.1 MG/DL — SIGNIFICANT CHANGE UP (ref 1.6–2.6)
MCHC RBC-ENTMCNC: 30.2 PG — SIGNIFICANT CHANGE UP (ref 27–34)
MCHC RBC-ENTMCNC: 31.9 GM/DL — LOW (ref 32–36)
MCV RBC AUTO: 94.7 FL — SIGNIFICANT CHANGE UP (ref 80–100)
NRBC # BLD: 0 /100 WBCS — SIGNIFICANT CHANGE UP (ref 0–0)
PHOSPHATE SERPL-MCNC: 2.2 MG/DL — LOW (ref 2.5–4.5)
PLATELET # BLD AUTO: 238 K/UL — SIGNIFICANT CHANGE UP (ref 150–400)
POTASSIUM SERPL-MCNC: 3.5 MMOL/L — SIGNIFICANT CHANGE UP (ref 3.5–5.3)
POTASSIUM SERPL-SCNC: 3.5 MMOL/L — SIGNIFICANT CHANGE UP (ref 3.5–5.3)
RBC # BLD: 3.58 M/UL — LOW (ref 3.8–5.2)
RBC # FLD: 12.9 % — SIGNIFICANT CHANGE UP (ref 10.3–14.5)
SODIUM SERPL-SCNC: 143 MMOL/L — SIGNIFICANT CHANGE UP (ref 135–145)
TROPONIN T SERPL-MCNC: <0.01 NG/ML — SIGNIFICANT CHANGE UP (ref 0–0.01)
WBC # BLD: 7.97 K/UL — SIGNIFICANT CHANGE UP (ref 3.8–10.5)
WBC # FLD AUTO: 7.97 K/UL — SIGNIFICANT CHANGE UP (ref 3.8–10.5)

## 2021-03-12 PROCEDURE — 99232 SBSQ HOSP IP/OBS MODERATE 35: CPT

## 2021-03-12 PROCEDURE — 99233 SBSQ HOSP IP/OBS HIGH 50: CPT

## 2021-03-12 RX ORDER — ACETAMINOPHEN 500 MG
1000 TABLET ORAL ONCE
Refills: 0 | Status: COMPLETED | OUTPATIENT
Start: 2021-03-12 | End: 2021-03-12

## 2021-03-12 RX ORDER — CEPHALEXIN 500 MG
500 CAPSULE ORAL EVERY 12 HOURS
Refills: 0 | Status: DISCONTINUED | OUTPATIENT
Start: 2021-03-12 | End: 2021-03-15

## 2021-03-12 RX ORDER — POTASSIUM CHLORIDE 20 MEQ
40 PACKET (EA) ORAL ONCE
Refills: 0 | Status: COMPLETED | OUTPATIENT
Start: 2021-03-12 | End: 2021-03-12

## 2021-03-12 RX ADMIN — Medication 1000 MILLIGRAM(S): at 11:35

## 2021-03-12 RX ADMIN — ATORVASTATIN CALCIUM 80 MILLIGRAM(S): 80 TABLET, FILM COATED ORAL at 22:27

## 2021-03-12 RX ADMIN — Medication 1000 MILLIGRAM(S): at 10:08

## 2021-03-12 RX ADMIN — ENOXAPARIN SODIUM 40 MILLIGRAM(S): 100 INJECTION SUBCUTANEOUS at 10:08

## 2021-03-12 RX ADMIN — Medication 81 MILLIGRAM(S): at 11:58

## 2021-03-12 RX ADMIN — Medication 500 MILLIGRAM(S): at 17:39

## 2021-03-12 RX ADMIN — CLOPIDOGREL BISULFATE 75 MILLIGRAM(S): 75 TABLET, FILM COATED ORAL at 11:58

## 2021-03-12 RX ADMIN — OXYMETAZOLINE HYDROCHLORIDE 1 SPRAY(S): 0.5 SPRAY NASAL at 05:57

## 2021-03-12 RX ADMIN — ENOXAPARIN SODIUM 40 MILLIGRAM(S): 100 INJECTION SUBCUTANEOUS at 22:27

## 2021-03-12 RX ADMIN — Medication 40 MILLIEQUIVALENT(S): at 10:07

## 2021-03-12 NOTE — PROGRESS NOTE ADULT - SUBJECTIVE AND OBJECTIVE BOX
Patient is a 60y old  Female who presents with a chief complaint of CP, SOB, L side weakness (12 Mar 2021 10:07)      INTERVAL HPI/OVERNIGHT EVENTS:  Seen by me this morning, feeling a bit better but still feeling weak and with some difficulty breathing due to packing in place. Has not eaten anything. Chest pain this morning with negative work up. Patient didn't c/o chest pain at time of my visit.    Review of Systems: 12 point review of systems otherwise negative    MEDICATIONS  (STANDING):  aspirin  chewable 81 milliGRAM(s) Oral daily  atorvastatin 80 milliGRAM(s) Oral at bedtime  cephalexin 500 milliGRAM(s) Oral every 12 hours  clopidogrel Tablet 75 milliGRAM(s) Oral daily  enoxaparin Injectable 40 milliGRAM(s) SubCutaneous every 24 hours  oxymetazoline 0.05% Nasal Spray 1 Spray(s) Both Nostrils every 12 hours  sodium chloride 0.9%. 1000 milliLiter(s) (100 mL/Hr) IV Continuous <Continuous>    MEDICATIONS  (PRN):      Allergies    apple (Unknown)  No Known Drug Allergies    Intolerances          Vital Signs Last 24 Hrs  T(C): 36.9 (12 Mar 2021 13:48), Max: 37.2 (12 Mar 2021 10:06)  T(F): 98.5 (12 Mar 2021 13:48), Max: 98.9 (12 Mar 2021 10:06)  HR: 90 (12 Mar 2021 16:02) (69 - 92)  BP: 124/56 (12 Mar 2021 16:02) (124/56 - 170/74)  BP(mean): 81 (12 Mar 2021 16:02) (81 - 106)  RR: 17 (12 Mar 2021 16:02) (16 - 17)  SpO2: 98% (12 Mar 2021 16:02) (96% - 100%)  CAPILLARY BLOOD GLUCOSE          03-11 @ 07:01  -  03-12 @ 07:00  --------------------------------------------------------  IN: 900 mL / OUT: 550 mL / NET: 350 mL    03-12 @ 07:01  -  03-12 @ 17:24  --------------------------------------------------------  IN: 350 mL / OUT: 700 mL / NET: -350 mL        Physical Exam:    Daily     Daily   General:  Well appearing, NAD, not cachetic  HEENT:  +Nasal packing in place on both nostrils with some +blood noted on packing  CV:  RRR, no murmur, no JVD  Lungs:  CTA B/L, no wheezes, rales, rhonchi  Abdomen:  Soft, non-tender, no distended, positive BS  Extremities:  2+ pulses, no c/c, no edema  Skin:  Warm and dry, no rashes  :  No matute  Neuro: Alert & Oriented X3, Good concentration; Motor Strength 5/5 B/L upper and lower extremities  No Restraints    LABS:                        10.8   7.97  )-----------( 238      ( 12 Mar 2021 08:10 )             33.9     03-12    143  |  109<H>  |  12  ----------------------------<  96  3.5   |  25  |  0.58    Ca    8.6      12 Mar 2021 08:10  Phos  2.2     03-12  Mg     2.1     03-12    TPro  6.7  /  Alb  3.8  /  TBili  0.2  /  DBili  x   /  AST  22  /  ALT  18  /  AlkPhos  101  03-10    PT/INR - ( 11 Mar 2021 06:05 )   PT: 12.2 sec;   INR: 1.02          PTT - ( 11 Mar 2021 06:05 )  PTT:31.4 sec        RADIOLOGY & ADDITIONAL TESTS:  Reviewed by me

## 2021-03-12 NOTE — CONSULT NOTE ADULT - SUBJECTIVE AND OBJECTIVE BOX
Patient is a 60y old  Female who presents with a chief complaint of CP, SOB, L side weakness (12 Mar 2021 09:28)       HPI:  Pt is a 61 yo F with PMH HTN, HLD, asthma, ?RADHA, and HA who p/f outpt surgical center after septoplasty with L CP, LUE pain, SOB since waking from anesthesia. Underwent septoplasty at [facility] and woke from anesthesia around 12pm with LUE pain, L CP, SOB with EKG WNL and given SL nitro/ASA with mild improvement in symptoms; noted to have BL wheezing and given 2x duonebs with improvement. Respiratory status complicated by nasal packing from post-op. Pt then began to develop HA and decision made to transport pt to ER. Of note, pt and pt's daughter both poor historians and information limited. On arrival to [facility], pt complaining of new L sided weakness/numbness prompting stroke code.    On arrival, T 98.4, , /79, RR 18 O2sat 98% RA. EKG with NSR without ischemic changes. Labs with WBC 10.34, 94% neutrophils, Hb 11.4, MCV 94.7, lipid panel with , TSH 0.122, A1C 5.9, trop neg, BNP neg. Stroke code with NIHSS 5. CTH neg, CTA neg, CTP neg, CTA PE neg. Admitted to  for further observation and management. (10 Mar 2021 20:12)      PAST MEDICAL & SURGICAL HISTORY:  Asthma    HLD (hyperlipidemia)    HTN (hypertension)        MEDICATIONS  (STANDING):  acetaminophen   Tablet .. 1000 milliGRAM(s) Oral once  aspirin  chewable 81 milliGRAM(s) Oral daily  atorvastatin 80 milliGRAM(s) Oral at bedtime  clopidogrel Tablet 75 milliGRAM(s) Oral daily  enoxaparin Injectable 40 milliGRAM(s) SubCutaneous every 24 hours  oxymetazoline 0.05% Nasal Spray 1 Spray(s) Both Nostrils every 12 hours  potassium chloride   Powder 40 milliEquivalent(s) Oral once  sodium chloride 0.9%. 1000 milliLiter(s) (100 mL/Hr) IV Continuous <Continuous>    MEDICATIONS  (PRN):        Social History:               -  Home Living Status:  lives with her spouse in an apartment building, no steps to enter           -  Prior Home Care Services:   X    Baseline Functional Level Prior to Admission:             - ADL's/ IADL's:    patient states she is fully independent           - ambulatory status PTA:  walked without assistive devices    FAMILY HISTORY:      CBC Full  -  ( 12 Mar 2021 08:10 )  WBC Count : 7.97 K/uL  RBC Count : 3.58 M/uL  Hemoglobin : 10.8 g/dL  Hematocrit : 33.9 %  Platelet Count - Automated : 238 K/uL  Mean Cell Volume : 94.7 fl  Mean Cell Hemoglobin : 30.2 pg  Mean Cell Hemoglobin Concentration : 31.9 gm/dL  Auto Neutrophil # : x  Auto Lymphocyte # : x  Auto Monocyte # : x  Auto Eosinophil # : x  Auto Basophil # : x  Auto Neutrophil % : x  Auto Lymphocyte % : x  Auto Monocyte % : x  Auto Eosinophil % : x  Auto Basophil % : x      03-12    143  |  109<H>  |  12  ----------------------------<  96  3.5   |  25  |  0.58    Ca    8.6      12 Mar 2021 08:10  Phos  2.2     03-12  Mg     2.1     03-12    TPro  6.7  /  Alb  3.8  /  TBili  0.2  /  DBili  x   /  AST  22  /  ALT  18  /  AlkPhos  101  03-10            Radiology:    < from: CT Angio Head w/ IV Cont (03.10.21 @ 17:37) >    EXAM:  CT ANGIO BRAIN (W)AW IC                          EXAM:  CT ANGIO NECK (W)AW IC                          PROCEDURE DATE:  03/10/2021          INTERPRETATION:  PROCEDURE: CTA brain with and without intravenous contrast.    INDICATION: Left oliver leg weakness. Status post septoplasty.    TECHNIQUE: Multiple axial thin section were obtained through the [place] of [place] following the intravenous bolus injection of 80 mL of Optiray-350. MIP reformats were created.    COMPARISON: None.    FINDINGS: The internal carotid arteries at the skull base and intracranial compartment are widely patent and of symmetric caliber. Similarly, the anterior and middle cerebral arteries appear patent and grossly symmetric in caliber at their first and second order branches without point of occlusion or high-grade stenosis. At the posterior circulation, both intracranial vertebral arteries are patent, with normal variant termination of the left vertebral artery as PICA. The dominant right vertebral artery and the basilar artery are widely patent and normal caliber, as are both posterior cerebral arteries at their first and second order branches without point of occlusion or high-grade stenosis. No intracranial aneurysm or high flow vascular malformation is identified.    IMPRESSION: Unremarkable CTA of the brain.      ----------------------      PROCEDURE: CTA neck with intravenous contrast.    INDICATION: As above    TECHNIQUE: Multiple axial thin section were obtained through the neck following the intravenous bolus injection of contrast material, as above. MIP reformats were created.    COMPARISON: None    FINDINGS:    The aortic arch is normal in caliber with variant 2 vessel configuration. No great vessel stenosis.    Both common carotid arteries are widely patent from their origins up to the bifurcations. There is minimal plaque of the proximal internal carotid arteries, calcified on the right and noncalcified on the left, without evidence of carotid stenosis. No carotid dissection, and otherwise the internal carotid arteries are widely patent bilaterally at the skull base with normal caliber.    Both vertebral arteries are patent at their origins and throughout the neck without point of occlusion or high-grade narrowing. No evidence of vertebral artery dissection. The left vertebral artery is hypoplastic throughout.    Linear atelectatic change is noted at the deep and in portions of the visualized lungs. A tracheal diverticulum is incidentally noted. A couple subcentimeter nodules are incidentally noted in the right thyroid gland, too small to warrant further imaging investigation at this time per ACR guidelines.    IMPRESSION:    No carotid or vertebral artery steno-occlusive disease or dissection injury in the neck.        < from: CT Brain Stroke Protocol (03.10.21 @ 17:36) >  EXAM:  CT BRAIN STROKE PROTOCOL                          PROCEDURE DATE:  03/10/2021          INTERPRETATION:  ISchuyler MD, have reviewed the images and the report and agree with the findings.    No acute intracranial hemorrhage, mass effect or CT evidence of recent transcortical infarct.    Minor modification: There are scattered punctate cortical calcifications which may be stigmata of prior granulomatous infection or possible neurocysticercosis if the patient is native to an endemic area. These are currently of doubtful clinical significance without surrounding edema or mass effect.    No pneumocephalus is present in this patient status post septoplasty. There is expected mixed density of packing material and hemorrhage inthe inferior nasal cavities and also there is left asymmetric ethmoid sinus opacification. On coronal and sagittal 3 mm images, there is no obvious skull base defect.        PRELIMINARY REPORT:    PROCEDURE: CT head without intravenous contrast    INDICATIONS: Left arm and leg weakness status post septoplasty.    TECHNIQUE:  Serial axial images were obtained from the skull base to the vertex without the use of intravenous contrast. Coronal and sagittal reconstructions were created. RAPID-AI is utilized for preliminary hemorrhage detection.    COMPARISON EXAMINATION: None.    FINDINGS:  VENTRICLES AND SULCI: The ventricles and sulci are normal in appearance for patient's age. No hydrocephalus.  INTRA-AXIAL: No intracranial mass, acute hemorrhage, or midline shift is present. No acute transcortical infarction.  EXTRA-AXIAL: No extra-axial fluid collection is present. Prominent CSF space measuring 2 cm adjacent to the right frontal lobe with adjacent scalloping of the calvarium likely represents an arachnoid cyst.  VISUALIZED SINUSES: There is mild opacification of the ethmoidal air cells greater on the left. Patient is status post septoplasty with postsurgical changes noted within the bilateral nasal cavities, greater on the left, with deformities of the bilateral frontal processes of the maxilla and left nasal bone noted.  VISUALIZED MASTOIDS: Well aerated.  CALVARIUM: No fracture.  MISCELLANEOUS:  There is hyperostosis frontalis interna.    IMPRESSION:    No acute intracranial hemorrhage or territorial infarction.    Status post septoplasty with postoperative changes, as above.        < from: CT Angio Chest PE Protocol w/ IV Cont (03.10.21 @ 17:23) >  EXAM:  CT ANGIO CHEST PE PROTOCOL IC                          PROCEDURE DATE:  03/10/2021          INTERPRETATION:  CTA (CT angiography) of the CHEST    INDICATION: Shortness of breath and chest pain after surgery. Assess for pulmonary embolism.    TECHNIQUE: CT angiography of the chest was performed during bolus injection of intravenous contrast.  Post-processing including the production of axial, coronal and sagittal multiplanar reformatted images and axial and coronal maximum intensity projections (MIPs) was performed.    PRIOR STUDY: None.    FINDINGS: Study is limited by respiratory motion artifact.  Pulmonary artery: Satisfactory opacification of main pulmonary artery. Limited evaluation of the subsegmental pulmonary arteries due to motion artifact. No filling  defects in the central pulmonary arteries.    Other vessels and heart: The aorta is normal in caliber. The heart is normal  size. No pericardial effusion.  Lungs and airways: There is subtle perihilar groundglass opacities in both lungs. Linear and subsegmental atelectasis in the bilateral lower lobes.  Central airways are patent. There is a small right paratracheal diverticulum.  Pleura: No pleural effusions are seen.  Mediastinum, axilla and zeny: No mediastinal, hilar or axillary lymphadenopathy is seen.  Limited evaluation of the upper abdomen: no abnormality.  Soft tissue and osseous structures: mild degenerative changes of the spine.    IMPRESSION:  Limited evaluation of the subsegmental pulmonary arteries dueto motion artifact. No central pulmonary embolism.  Subtle perihilar groundglass opacities in both lungs, may reflect mild pulmonary edema.              Vital Signs Last 24 Hrs  T(C): 37.2 (12 Mar 2021 10:06), Max: 37.2 (12 Mar 2021 10:06)  T(F): 98.9 (12 Mar 2021 10:06), Max: 98.9 (12 Mar 2021 10:06)  HR: 72 (12 Mar 2021 08:18) (69 - 80)  BP: 125/62 (12 Mar 2021 08:18) (125/62 - 167/59)  BP(mean): 87 (12 Mar 2021 08:18) (85 - 105)  RR: 17 (12 Mar 2021 08:18) (16 - 18)  SpO2: 98% (12 Mar 2021 08:18) (97% - 100%)    REVIEW OF SYSTEMS:    CONSTITUTIONAL: No fever, weight loss, or fatigue  EYES: No eye pain, visual disturbances, or discharge  ENMT:  No difficulty hearing, tinnitus, vertigo; No sinus or throat pain  NECK: No pain or stiffness  BREASTS: No pain, masses, or nipple discharge  RESPIRATORY: No cough, wheezing, chills or hemoptysis; No shortness of breath  CARDIOVASCULAR: No chest pain, palpitations, dizziness, or leg swelling  GASTROINTESTINAL: No abdominal or epigastric pain. No nausea, vomiting, or hematemesis; No diarrhea or constipation. No melena or hematochezia.  GENITOURINARY: No dysuria, frequency, hematuria, or incontinence  NEUROLOGICAL: per HPI, left sided weakness  SKIN: No itching, burning, rashes, or lesions   LYMPH NODES: No enlarged glands  ENDOCRINE: No heat or cold intolerance; No hair loss  MUSCULOSKELETAL: No joint pain or swelling; No muscle, back, or extremity pain  PSYCHIATRIC: No depression, anxiety, mood swings, or difficulty sleeping  HEME/LYMPH: No easy bruising, or bleeding gums  ALLERGY AND IMMUNOLOGIC: No hives or eczema  VASCULAR: no swelling, erythema,   : no dysuria, hematuria, frequency        Physical Exam:  on contact/ droplet isolation, obese 61 yo  woman lying in semi Mota's position, no acute complaints    Neurologic Exam:    Alert and oriented to person, place, date/year, speech fluent w/ mild dysarthria, follows commands, recent and remote memory intact, repetition intact, comprehension intact,  attention/concentration intact, fund of knowledge appropriate    Cranial Nerves:     II:                         pupils equal, round and reactive to light, visual fields intact   III/ IV/VI:             extraocular movements intact, neg nystagmus, neg ptosis  V:                        facial sensation intact, V1-3 normal  VII:                      face symmetric, no droop, normal eye closure and smile  VIII:                     hearing intact to finger rub bilaterally  IX and X:             no hoarseness, gag intact, palate/ uvula rise symmetrically  XI:                       SCM/ trapezius strength intact bilateral  XII:                      no dysarthria, tongue weakness, fasciculations    Motor Exam:    Right UE:            : 5/5                             wrist extensors/ flexors: 5/5                             biceps:   5/5                             triceps:  5/5                             deltoid:  5/5                             pronator drift: neg    Left UE:              :  4/5                             wrist extensors/ flexors:  4/5                             biceps:    5/5                             triceps:   3+/5                             deltoid:  4/5                             pronator drift:  slight      Right LE:            dorsiflexors:  5/5                            plantar flexors:  5/5                            quadriceps:  5/5                            hamstrings:  5/5                            hip flexors:  5/5        Left LE:              dorsiflexors:  5/5                            plantar flexors:  5/5                            quadriceps:  4/5                            hamstrings:  4/5                            hip flexors:  3+/5               Sensation:       Intact to light touch x 4 extremities                           No neglect or extinction on double simultaneous testing.                       DTR:                  biceps/brachioradialis: equal bilaterally                                                     patella/ankle: equal bilaterally                                                     neg clonus                           neg Babinski                        Finger to Nose:            R: nl        L: slight dysmetria    Heel to shin: wnl bilaterally           Rapid Alternating movements:            R:  nl       L: slight clumsiness    Joint Position Sense: wnl bilaterally          R:           L:     Romberg:  not tested    Tandem Walking:  not tested    Gait:  not tested        PM&R Impression:    1) r/o acute stroke, CT imaging negative for acute stroke, MRI brain pending  2) Left sided weakness    Plan: cleared to begin rehab    1) Physical therapy focusing on therapeutic exercises, bed mobility/transfer out of bed evaluation, progressive ambulation with assistive devices prn.    2) Anticipated Disposition Plan/Recs:  acute rehab placement if MRI reveals acute infarct

## 2021-03-12 NOTE — PROGRESS NOTE ADULT - PROBLEM SELECTOR PLAN 8
Patient with known hx of HTN. Home medications include valsartan and norvasc.  - Holding home anti-HTN at this time  - Goal sBP <180  - Restart home medications PRN

## 2021-03-12 NOTE — PROGRESS NOTE ADULT - SUBJECTIVE AND OBJECTIVE BOX
60F w/ hx HTN, HLD, asthma here with CP and SOB following septoplasty at [facility] 3/10; ENT consulted for packing removal. Pt states overall she feels better since coming in to hospital. Some oozing around packing.     Interval 3/12: NAEON. Minimal bleeding from nose. Hgb stable    PMH: as above  PSx: 3/10/21: septoplasty, BITR, devon bullosa resection, with b/l telfa packs and L merocel in place    PE:  Gen: NAD  Nose: R nare with surgicel wrapped surgifoam, L nare with surgicel wrapped surgifoam and 8cm merocel   OC/OP: minimal oozing down posterior OP  Neck: supple  Resp: NLB on RA    Hgb/Hct  3/12: 10.8/34  3/11 (6am): 11.5/36 -> (11pm) 10.7/33    A/P:  60F here with post-op epistaxis. Currently with absorbable surgicel/surgifoam and non-absorbable merocel on L.    -Please have afrin available at bedside  -HOB elevated 30 deg  -Replace mustache dressing as needed. Notify ENT if needing to be replaced more frequently than q1hour  -Pain control  -BP control   -AC/AP as per primary. If no longer indicated (pending further imaging/eval), consider discontinuing  -Patient will follow with ENT Dr. Rudy Blackmon on Tuesday 3/16 for packing removal   -Page ENT with questions/concerns  60F w/ hx HTN, HLD, asthma here with CP and SOB following septoplasty at [facility] 3/10; ENT consulted for packing removal. Pt states overall she feels better since coming in to hospital. Some oozing around packing.     Interval 3/12: NAEON. Minimal bleeding from nose. Hgb stable    PMH: as above  PSx: 3/10/21: septoplasty, BITR, devon bullosa resection, with b/l telfa packs and L merocel in place    PE:  Gen: NAD  Nose: R nare with surgicel wrapped surgifoam, L nare with surgicel wrapped surgifoam and 8cm merocel   OC/OP: minimal oozing down posterior OP  Neck: supple  Resp: NLB on RA    Hgb/Hct  3/12: 10.8/34  3/11 (6am): 11.5/36 -> (11pm) 10.7/33    A/P:  60F here with post-op epistaxis. Currently with absorbable surgicel/surgifoam and non-absorbable merocel on L.    -Please have afrin available at bedside  -HOB elevated 30 deg  -Replace mustache dressing as needed. Notify ENT if needing to be replaced more frequently than q1hour  -Pain control  -BP control   -AC/AP as per primary. If no longer indicated (pending further imaging/eval), consider discontinuing  -Recommend anti-staph abx while packing in place   -Patient will follow with ENT Dr. Rudy Blackmon on Tuesday 3/16 for packing removal   -Page ENT with questions/concerns

## 2021-03-12 NOTE — PROGRESS NOTE ADULT - PROBLEM SELECTOR PLAN 6
On admission, initial labs notable for Hb 11.4 with MCV 94.7. Normocytic anemia likely in setting of nasal epistaxis in the setting of recent septoplasty.   - ENT consulted to assess nasal packing left post-procedure  - Keep active T/S  - Transfuse for Hb <7 On admission, initial labs notable for Hb 11.4 with MCV 94.7. Normocytic anemia likely in setting of nasal epistaxis in the setting of recent septoplasty.   - Keep active T/S  - Transfuse for Hb <7

## 2021-03-12 NOTE — PROGRESS NOTE ADULT - ASSESSMENT
59 yo F with PMHx HTN, HLD, asthma, RADHA (CPAP at night) presenting to [facility] from [facility] s/p septoplasty complaining of chest pain, LUE pain, and shortness of breath after procedural anesthesia. New onset L-sided weakness/numbness on arrival prompting stroke code with NIHSS 5 however negative imaging. Patient is being admitted to [facility] for further management.

## 2021-03-12 NOTE — PROGRESS NOTE ADULT - PROBLEM SELECTOR PLAN 5
On admission, initial labs notable for TSH 0.122.  - F/u free T4 On admission, initial labs notable for TSH 0.122.  - Free T4 1.07

## 2021-03-12 NOTE — PROGRESS NOTE ADULT - PROBLEM SELECTOR PLAN 7
Patient with known hx of asthma and RADHA (CPAP at bedtime). On admission, lungs CTA without wheezing.  - Currently satting well on RA  - CPAP at bedtime

## 2021-03-12 NOTE — OCCUPATIONAL THERAPY INITIAL EVALUATION ADULT - IMPAIRED TRANSFERS: SIT/STAND, REHAB EVAL
impaired balance/impaired coordination/decreased flexibility/impaired motor control/abnormal muscle tone/impaired postural control/decreased ROM

## 2021-03-12 NOTE — CONSULT NOTE ADULT - ASSESSMENT
per Neurology    61 yo F with PMHx HTN, HLD, asthma, RADHA (CPAP at night) presenting to [facility] from [facility] s/p septoplasty complaining of chest pain, LUE pain, and shortness of breath after procedural anesthesia. New onset L-sided weakness/numbness on arrival prompting stroke code with NIHSS 5 however negative imaging. Patient is being admitted to [facility] for further management.    Problem/Plan - 1:  ·  Problem: R/O CVA (cerebral vascular accident).  Plan: Patient presenting from [facility] s/p septoplasty with complaints of chest pain, shortness of breath, new onset L-sided weakness/numbness. NIHSS 5 on arrival with negative CTH, CTP, CTA head/neck.   - MR brain (ordered)  - sBP goal <180  - Passed bedside dysphagia  - ASA 81mg   - Atorvastatin 80mg Qhs  - F/u PT/OT.     Problem/Plan - 2:  ·  Problem: ENT complaint.  Plan: Patient presenting s/p septoplasty at [facility] day of admission with nasal packing in B/L nares.  - ENT consulted, recs appreciated.     Problem/Plan - 3:  ·  Problem: Hypoxia.  Plan: Patient presenting with SOB and hypoxic to low 90s on RA after procedural anesthesia s/p septoplasty. Hypoxia likely in setting of b/l nasal packing in place. Placed on NRB with improvement in O2 sat, now transitioned to RA. CXR without evidence of new consolidation/infiltrate.   - Now off O2 on RA.     Problem/Plan - 4:  ·  Problem: Headache.  Plan: Patient with hx of chronic headache.   - Pain control PRN.     Problem/Plan - 5:  ·  Problem: R/O Hyperthyroidism.  Plan: On admission, initial labs notable for TSH 0.122.  - F/u free T4.     Problem/Plan - 6:  Problem: Normocytic anemia. Plan: On admission, initial labs notable for Hb 11.4 with MCV 94.7. Normocytic anemia likely in setting of nasal epistaxis in the setting of recent septoplasty.   - ENT consulted to assess nasal packing left post-procedure  - Keep active T/S  - Transfuse for Hb <7.    Problem/Plan - 7:  ·  Problem: Asthma.  Plan: Patient with known hx of asthma and RADHA (CPAP at bedtime). On admission, lungs CTA without wheezing.  - Currently satting well on RA  - CPAP at bedtime.     Problem/Plan - 8:  ·  Problem: HTN (hypertension).  Plan: Patient with known hx of HTN. Home medications include valsartan and norvasc.  - Holding home anti-HTN at this time  - Goal sBP <180  - Restart home medications PRN.     Problem/Plan - 9:  ·  Problem: HLD (hyperlipidemia).  Plan: Patient with known hx of HLD. Home medications include atorvastatin.  - Atorvastatin 80mg Qhs.     Problem/Plan - 10:  Problem: Nutrition, metabolism, and development symptoms. Plan; F: None  E: Replete PRN  N: Diet, DASH/TLC  DVT ppx: Lovenox  GI ppx: None  Bowel: None  Dispo: 7LA    FULL CODE.

## 2021-03-12 NOTE — OCCUPATIONAL THERAPY INITIAL EVALUATION ADULT - PERTINENT HX OF CURRENT PROBLEM, REHAB EVAL
S/P Septoplasty at [facility]. Now w/ L sided weakness and c/o chest pain. R/O CVA (cerebral vascular accident). Hypoxia.

## 2021-03-12 NOTE — OCCUPATIONAL THERAPY INITIAL EVALUATION ADULT - ADDITIONAL COMMENTS
Pt states that she lives with her  in [facility], no stairs/elevator to enter. Pt states that she was independent in her ADLs prior to admission. No prior use of AEs/DMEs reported.

## 2021-03-12 NOTE — OCCUPATIONAL THERAPY INITIAL EVALUATION ADULT - GENERAL OBSERVATIONS, REHAB EVAL
Pt is right hand dominant. Pt's RN Ni Aware of intent to eval/tx; cleared Pt. Pt received in supine - +telemetry, dressing to nose, heplock. Pt with flat affect; agreeable to OT.

## 2021-03-12 NOTE — PROGRESS NOTE ADULT - SUBJECTIVE AND OBJECTIVE BOX
**Incomplete Note**    INTERVAL HPI/OVERNIGHT EVENTS:    SUBJECTIVE:   Patient seen and examined at bedside.    OBJECTIVE:    VITAL SIGNS:  ICU Vital Signs Last 24 Hrs  T(C): 36.9 (12 Mar 2021 05:54), Max: 36.9 (11 Mar 2021 10:18)  T(F): 98.5 (12 Mar 2021 05:54), Max: 98.5 (12 Mar 2021 05:54)  HR: 74 (12 Mar 2021 05:59) (69 - 80)  BP: 140/67 (12 Mar 2021 05:59) (126/60 - 167/59)  BP(mean): 96 (12 Mar 2021 05:59) (85 - 105)  ABP: --  ABP(mean): --  RR: 16 (12 Mar 2021 05:59) (16 - 18)  SpO2: 98% (12 Mar 2021 05:59) (97% - 100%)        03-11 @ 07:01  -  03-12 @ 07:00  --------------------------------------------------------  IN: 900 mL / OUT: 0 mL / NET: 900 mL      CAPILLARY BLOOD GLUCOSE  136 (10 Mar 2021 19:18)      POCT Blood Glucose.: 136 mg/dL (10 Mar 2021 17:11)      PHYSICAL EXAM:    General: NAD; Lying comfortably in bed  HEENT: NC/AT; PERRL, clear conjunctiva; MMM  Neck: Supple. No JVD or thyromegaly   Respiratory: CTA b/l. No wheezes, rhonchi, rales.  Cardiovascular: +S1/S2; RRR; No murmurs, rubs, gallops.  Abdomen: soft, NT/ND; +BS x4  Extremities: WWP, 2+ peripheral pulses b/l; no LE edema  Skin: normal color and turgor; no rash  Neurological: AOx3    MEDICATIONS:  MEDICATIONS  (STANDING):  aspirin  chewable 81 milliGRAM(s) Oral daily  atorvastatin 80 milliGRAM(s) Oral at bedtime  clopidogrel Tablet 75 milliGRAM(s) Oral daily  enoxaparin Injectable 40 milliGRAM(s) SubCutaneous every 24 hours  oxymetazoline 0.05% Nasal Spray 1 Spray(s) Both Nostrils every 12 hours  sodium chloride 0.9%. 1000 milliLiter(s) (100 mL/Hr) IV Continuous <Continuous>    MEDICATIONS  (PRN):      ALLERGIES:  Allergies    apple (Unknown)  No Known Drug Allergies    Intolerances        LABS:                        10.7   11.98 )-----------( 232      ( 11 Mar 2021 22:48 )             33.7     03-11    140  |  108  |  9   ----------------------------<  119<H>  4.4   |  26  |  0.57    Ca    9.3      11 Mar 2021 06:05  Phos  3.6     03-11  Mg     2.0     03-11    TPro  6.7  /  Alb  3.8  /  TBili  0.2  /  DBili  x   /  AST  22  /  ALT  18  /  AlkPhos  101  03-10    PT/INR - ( 11 Mar 2021 06:05 )   PT: 12.2 sec;   INR: 1.02          PTT - ( 11 Mar 2021 06:05 )  PTT:31.4 sec      RADIOLOGY & ADDITIONAL TESTS: Reviewed. INTERVAL HPI/OVERNIGHT EVENTS:  Dressing changed per ENT. IV morphine given for headache. HD stable.    SUBJECTIVE:   Patient seen and examined at bedside. Complaining of headache and localized chest pain over L chest. Ordered for 12 lead EKG (NSR) and cardiac enzymes (normal). Pain resolved after administration of 1g PO Tylenol     OBJECTIVE:    VITAL SIGNS:  ICU Vital Signs Last 24 Hrs  T(C): 36.9 (12 Mar 2021 05:54), Max: 36.9 (11 Mar 2021 10:18)  T(F): 98.5 (12 Mar 2021 05:54), Max: 98.5 (12 Mar 2021 05:54)  HR: 74 (12 Mar 2021 05:59) (69 - 80)  BP: 140/67 (12 Mar 2021 05:59) (126/60 - 167/59)  BP(mean): 96 (12 Mar 2021 05:59) (85 - 105)  RR: 16 (12 Mar 2021 05:59) (16 - 18)  SpO2: 98% (12 Mar 2021 05:59) (97% - 100%)        03-11 @ 07:01  -  03-12 @ 07:00  --------------------------------------------------------  IN: 900 mL / OUT: 0 mL / NET: 900 mL      CAPILLARY BLOOD GLUCOSE  136 (10 Mar 2021 19:18)      POCT Blood Glucose.: 136 mg/dL (10 Mar 2021 17:11)      PHYSICAL EXAM:    General: NAD; Lying comfortably in bed  HEENT: NC/AT; PERRL, clear conjunctiva; MMM; moustache dressing in place, clean/dry  Neck: Supple. No JVD or thyromegaly   Respiratory: CTA b/l. No wheezes, rhonchi, rales.  Cardiovascular: +S1/S2; RRR; No murmurs, rubs, gallops.  Abdomen: soft, NT/ND; +BS x4  Extremities: WWP, 2+ peripheral pulses b/l; no LE edema  Skin: normal color and turgor; no rash  Neurological: AOx3. CN 2-12 grossly intact.    MEDICATIONS:  MEDICATIONS  (STANDING):  aspirin  chewable 81 milliGRAM(s) Oral daily  atorvastatin 80 milliGRAM(s) Oral at bedtime  clopidogrel Tablet 75 milliGRAM(s) Oral daily  enoxaparin Injectable 40 milliGRAM(s) SubCutaneous every 24 hours  oxymetazoline 0.05% Nasal Spray 1 Spray(s) Both Nostrils every 12 hours  sodium chloride 0.9%. 1000 milliLiter(s) (100 mL/Hr) IV Continuous <Continuous>    MEDICATIONS  (PRN):      ALLERGIES:  Allergies    apple (Unknown)  No Known Drug Allergies    Intolerances        LABS:                        10.7   11.98 )-----------( 232      ( 11 Mar 2021 22:48 )             33.7     03-11    140  |  108  |  9   ----------------------------<  119<H>  4.4   |  26  |  0.57    Ca    9.3      11 Mar 2021 06:05  Phos  3.6     03-11  Mg     2.0     03-11    TPro  6.7  /  Alb  3.8  /  TBili  0.2  /  DBili  x   /  AST  22  /  ALT  18  /  AlkPhos  101  03-10    PT/INR - ( 11 Mar 2021 06:05 )   PT: 12.2 sec;   INR: 1.02          PTT - ( 11 Mar 2021 06:05 )  PTT:31.4 sec      RADIOLOGY & ADDITIONAL TESTS: Reviewed.

## 2021-03-12 NOTE — OCCUPATIONAL THERAPY INITIAL EVALUATION ADULT - BALANCE DISTURBANCE, IDENTIFIED IMPAIRMENT CONTRIBUTE, REHAB EVAL
impaired coordination/impaired motor control/pain/impaired postural control/decreased ROM/decreased strength

## 2021-03-12 NOTE — PROGRESS NOTE ADULT - PROBLEM SELECTOR PLAN 1
Patient presenting from [facility] s/p septoplasty with complaints of chest pain, shortness of breath, new onset L-sided weakness/numbness. NIHSS 5 on arrival with negative CTH, CTP, CTA head/neck.   - MR brain (ordered)  - sBP goal <180  - Passed bedside dysphagia  - ASA 81mg   - Atorvastatin 80mg Qhs  - F/u PT/OT

## 2021-03-12 NOTE — PROGRESS NOTE ADULT - PROBLEM SELECTOR PLAN 3
Patient presenting with SOB and hypoxic to low 90s on RA after procedural anesthesia s/p septoplasty. Hypoxia likely in setting of b/l nasal packing in place. Placed on NRB with improvement in O2 sat, now transitioned to RA. CXR without evidence of new consolidation/infiltrate.   - Now off O2 on RA

## 2021-03-12 NOTE — PROGRESS NOTE ADULT - ASSESSMENT
60 year old female with,    # LUE pain/left side weakness: occurring post-op, imaging non revealing so far for CVA. Care per Stroke. Brain MRI is still pending. TTE with EF 65-70%. Mild MR. No e/o R to L shunt. C/w ASA/plavix/statin. PT/OT evals --> inpatient rehab pending further imaging/progress. DVT PPx with LMWH.    # HTN: Goal SBP per Stroke <180. Holding home meds: valsartan/amlodipine. Accurate med rec to be done.    # Asthma: not in exacerbation at this time. Ok to resume singulair. Nebs as needed.    # S/p septoplasty 3/10: Apprec ENT recs, c/w nasal packing, afrin, kefles for PPx while packing is in place. Packing removal 3/16 as outpatient.    # Post-op anemia: likely d/t recent procedure plus DAPT. Keep Hg above 7. Anticipate dc of DAPT if MRI is negative.    # Sleep apnea: C/w CPAP at bedtime.     # Leukocytosis: likely reactive post-op, resolved.    # Hyperlipidemia: c/w statin.     # Low TSH: with normal FT4/T4. Recommend to repeat TFT's in 4 weeks as outpatient.    # Pre-diabetes: with A1C of 5.9. Monitor BS for now.    # BMI of 31.7 --> Obesity.    D/w Stroke Team, will continue to follow up with you.  .

## 2021-03-12 NOTE — PROGRESS NOTE ADULT - PROBLEM SELECTOR PLAN 10
F: None  E: Replete PRN  N: Diet, DASH/TLC  DVT ppx: Lovenox  GI ppx: None  Bowel: None  Dispo: 7LA    FULL CODE

## 2021-03-12 NOTE — OCCUPATIONAL THERAPY INITIAL EVALUATION ADULT - MD ORDER
59 yo F with PMHx HTN, HLD, asthma, RADHA (CPAP at night) presenting to [facility] from [facility] s/p septoplasty complaining of chest pain, LUE pain, and shortness of breath after procedural anesthesia. New onset L-sided weakness/numbness on arrival prompting stroke code with NIHSS 5 however negative imaging.

## 2021-03-12 NOTE — OCCUPATIONAL THERAPY INITIAL EVALUATION ADULT - PLANNED THERAPY INTERVENTIONS, OT EVAL
ADL retraining/IADL retraining/balance training/bed mobility training/cognitive, visual perceptual/fine motor coordination training/motor coordination training/neuromuscular re-education/parent/caregiver training.../ROM/strengthening/transfer training

## 2021-03-12 NOTE — PROGRESS NOTE ADULT - PROBLEM SELECTOR PLAN 2
Patient presenting s/p septoplasty at [facility] day of admission with nasal packing in B/L nares.  - ENT consulted, recs appreciated [facility] packing in B/L nares. Packing removed 3/11 and replaced with moustache dressing.  - Patient to f/u with ENT as outpatient  - ENT consulted, recs appreciated

## 2021-03-13 LAB
ANION GAP SERPL CALC-SCNC: 9 MMOL/L — SIGNIFICANT CHANGE UP (ref 5–17)
BLD GP AB SCN SERPL QL: NEGATIVE — SIGNIFICANT CHANGE UP
BUN SERPL-MCNC: 11 MG/DL — SIGNIFICANT CHANGE UP (ref 7–23)
CALCIUM SERPL-MCNC: 9 MG/DL — SIGNIFICANT CHANGE UP (ref 8.4–10.5)
CHLORIDE SERPL-SCNC: 105 MMOL/L — SIGNIFICANT CHANGE UP (ref 96–108)
CO2 SERPL-SCNC: 26 MMOL/L — SIGNIFICANT CHANGE UP (ref 22–31)
CREAT SERPL-MCNC: 0.64 MG/DL — SIGNIFICANT CHANGE UP (ref 0.5–1.3)
GLUCOSE SERPL-MCNC: 115 MG/DL — HIGH (ref 70–99)
HCT VFR BLD CALC: 34.9 % — SIGNIFICANT CHANGE UP (ref 34.5–45)
HGB BLD-MCNC: 11.3 G/DL — LOW (ref 11.5–15.5)
MAGNESIUM SERPL-MCNC: 2.1 MG/DL — SIGNIFICANT CHANGE UP (ref 1.6–2.6)
MCHC RBC-ENTMCNC: 30 PG — SIGNIFICANT CHANGE UP (ref 27–34)
MCHC RBC-ENTMCNC: 32.4 GM/DL — SIGNIFICANT CHANGE UP (ref 32–36)
MCV RBC AUTO: 92.6 FL — SIGNIFICANT CHANGE UP (ref 80–100)
NRBC # BLD: 0 /100 WBCS — SIGNIFICANT CHANGE UP (ref 0–0)
PHOSPHATE SERPL-MCNC: 2.9 MG/DL — SIGNIFICANT CHANGE UP (ref 2.5–4.5)
PLATELET # BLD AUTO: 231 K/UL — SIGNIFICANT CHANGE UP (ref 150–400)
POTASSIUM SERPL-MCNC: 3.9 MMOL/L — SIGNIFICANT CHANGE UP (ref 3.5–5.3)
POTASSIUM SERPL-SCNC: 3.9 MMOL/L — SIGNIFICANT CHANGE UP (ref 3.5–5.3)
RBC # BLD: 3.77 M/UL — LOW (ref 3.8–5.2)
RBC # FLD: 12.6 % — SIGNIFICANT CHANGE UP (ref 10.3–14.5)
RH IG SCN BLD-IMP: POSITIVE — SIGNIFICANT CHANGE UP
SODIUM SERPL-SCNC: 140 MMOL/L — SIGNIFICANT CHANGE UP (ref 135–145)
WBC # BLD: 6.78 K/UL — SIGNIFICANT CHANGE UP (ref 3.8–10.5)
WBC # FLD AUTO: 6.78 K/UL — SIGNIFICANT CHANGE UP (ref 3.8–10.5)

## 2021-03-13 PROCEDURE — 99233 SBSQ HOSP IP/OBS HIGH 50: CPT | Mod: GC

## 2021-03-13 PROCEDURE — 99232 SBSQ HOSP IP/OBS MODERATE 35: CPT

## 2021-03-13 RX ORDER — ACETAMINOPHEN 500 MG
650 TABLET ORAL EVERY 6 HOURS
Refills: 0 | Status: DISCONTINUED | OUTPATIENT
Start: 2021-03-13 | End: 2021-03-15

## 2021-03-13 RX ADMIN — Medication 81 MILLIGRAM(S): at 12:03

## 2021-03-13 RX ADMIN — ATORVASTATIN CALCIUM 80 MILLIGRAM(S): 80 TABLET, FILM COATED ORAL at 21:16

## 2021-03-13 RX ADMIN — Medication 650 MILLIGRAM(S): at 12:03

## 2021-03-13 RX ADMIN — OXYMETAZOLINE HYDROCHLORIDE 1 SPRAY(S): 0.5 SPRAY NASAL at 18:52

## 2021-03-13 RX ADMIN — Medication 500 MILLIGRAM(S): at 17:46

## 2021-03-13 RX ADMIN — ENOXAPARIN SODIUM 40 MILLIGRAM(S): 100 INJECTION SUBCUTANEOUS at 21:16

## 2021-03-13 RX ADMIN — CLOPIDOGREL BISULFATE 75 MILLIGRAM(S): 75 TABLET, FILM COATED ORAL at 12:03

## 2021-03-13 RX ADMIN — Medication 650 MILLIGRAM(S): at 13:00

## 2021-03-13 RX ADMIN — Medication 650 MILLIGRAM(S): at 05:37

## 2021-03-13 RX ADMIN — Medication 500 MILLIGRAM(S): at 05:37

## 2021-03-13 NOTE — PROGRESS NOTE ADULT - ASSESSMENT
60 year old female with,    # LUE pain/left side weakness: resolved, occurring post-op, CT negative for acute infarct. Brain MRI pending.   -C/w ASA/plavix/statin for now per stroke, PT/OT evals --> inpatient rehab pending further imaging/progress.   -DVT PPx with LMWH.    # HTN:  today, goal <180 per stroke, hold home anti-HTN    # Asthma: not in exacerbation, can start singulair    # S/p septoplasty 3/10: f/u ENT recs, bleeding seems to have stopped, f/u 3/16, on keflex for ppx    # Post-op anemia: likely d/t recent procedure plus DAPT. Keep Hg above 7. Anticipate dc of DAPT if MRI is negative.    # Sleep apnea: C/w CPAP at bedtime.     # Leukocytosis: likely reactive post-op, resolved.    # Hyperlipidemia: c/w statin.     # Low TSH: with normal FT4/T4. Recommend to repeat TFT's in 4 weeks as outpatient.    # Pre-diabetes: with A1C of 5.9. Monitor BS for now.    # BMI of 31.7 --> Obesity.

## 2021-03-13 NOTE — PROGRESS NOTE ADULT - PROBLEM SELECTOR PLAN 1
Patient presenting from [facility] s/p septoplasty with complaints of chest pain, shortness of breath, new onset L-sided weakness/numbness. NIHSS 5 on arrival with negative CTH, CTP, CTA head/neck.   - MR brain (ordered)  - sBP goal <180  - Passed bedside dysphagia  - ASA 81mg   - Atorvastatin 80mg Qhs  - F/u PT/OT Patient presenting from [facility] s/p septoplasty with complaints of chest pain, shortness of breath, new onset L-sided weakness/numbness. NIHSS 5 on arrival with negative CTH, CTP, CTA head/neck.   - MR brain (ordered)  - sBP goal <180  - Passed bedside dysphagia  - ASA 81mg   - Atorvastatin 80mg Qhs  - PT recommending inpatient rehab

## 2021-03-13 NOTE — PROGRESS NOTE ADULT - ASSESSMENT
61 yo F with PMHx HTN, HLD, asthma, RADHA (CPAP at night) presenting to [facility] from [facility] s/p septoplasty complaining of chest pain, LUE pain, and shortness of breath after procedural anesthesia. New onset L-sided weakness/numbness on arrival prompting stroke code with NIHSS 5 however negative imaging. Patient is being admitted to [facility] for further management.

## 2021-03-13 NOTE — PROGRESS NOTE ADULT - PROBLEM SELECTOR PLAN 2
[facility] packing in B/L nares. Packing removed 3/11 and replaced with moustache dressing.  - Patient to f/u with ENT as outpatient  - ENT consulted, recs appreciated [facility] packing in B/L nares. Moustache dressing in place. Nasal packing to remain until f/u with ENT.  - Keflex for Staph ppx while nasal packing place   - Patient to f/u with ENT as outpatient  - ENT consulted, recs appreciated

## 2021-03-13 NOTE — PROGRESS NOTE ADULT - PROBLEM SELECTOR PLAN 6
On admission, initial labs notable for Hb 11.4 with MCV 94.7. Normocytic anemia likely in setting of nasal epistaxis in the setting of recent septoplasty.   - Keep active T/S  - Transfuse for Hb <7

## 2021-03-13 NOTE — PROGRESS NOTE ADULT - SUBJECTIVE AND OBJECTIVE BOX
**Incomplete Note**    INTERVAL HPI/OVERNIGHT EVENTS:    SUBJECTIVE:   Patient seen and examined at bedside.    OBJECTIVE:    VITAL SIGNS:  ICU Vital Signs Last 24 Hrs  T(C): 36.6 (13 Mar 2021 04:30), Max: 37.2 (12 Mar 2021 10:06)  T(F): 97.8 (13 Mar 2021 04:30), Max: 98.9 (12 Mar 2021 10:06)  HR: 73 (12 Mar 2021 23:30) (72 - 92)  BP: 136/72 (12 Mar 2021 23:30) (124/56 - 170/74)  BP(mean): 99 (12 Mar 2021 23:30) (81 - 106)  ABP: --  ABP(mean): --  RR: 17 (12 Mar 2021 23:30) (17 - 17)  SpO2: 99% (12 Mar 2021 23:30) (96% - 100%)        03-12 @ 07:01  -  03-13 @ 07:00  --------------------------------------------------------  IN: 350 mL / OUT: 1800 mL / NET: -1450 mL      CAPILLARY BLOOD GLUCOSE          PHYSICAL EXAM:    General: NAD; Lying comfortably in bed  HEENT: NC/AT; PERRL, clear conjunctiva; MMM  Neck: Supple. No JVD or thyromegaly   Respiratory: CTA b/l. No wheezes, rhonchi, rales.  Cardiovascular: +S1/S2; RRR; No murmurs, rubs, gallops.  Abdomen: soft, NT/ND; +BS x4  Extremities: WWP, 2+ peripheral pulses b/l; no LE edema  Skin: normal color and turgor; no rash  Neurological: AOx3    MEDICATIONS:  MEDICATIONS  (STANDING):  aspirin  chewable 81 milliGRAM(s) Oral daily  atorvastatin 80 milliGRAM(s) Oral at bedtime  cephalexin 500 milliGRAM(s) Oral every 12 hours  clopidogrel Tablet 75 milliGRAM(s) Oral daily  enoxaparin Injectable 40 milliGRAM(s) SubCutaneous every 24 hours  oxymetazoline 0.05% Nasal Spray 1 Spray(s) Both Nostrils every 12 hours  sodium chloride 0.9%. 1000 milliLiter(s) (100 mL/Hr) IV Continuous <Continuous>    MEDICATIONS  (PRN):  acetaminophen   Tablet .. 650 milliGRAM(s) Oral every 6 hours PRN Temp greater or equal to 38C (100.4F), Mild Pain (1 - 3)      ALLERGIES:  Allergies    apple (Unknown)  No Known Drug Allergies    Intolerances        LABS:                        11.3   6.78  )-----------( 231      ( 13 Mar 2021 06:46 )             34.9     03-13    140  |  105  |  11  ----------------------------<  x   3.9   |  26  |  0.64    Ca    9.0      13 Mar 2021 06:32  Phos  2.9     03-13  Mg     2.1     03-13            RADIOLOGY & ADDITIONAL TESTS: Reviewed. INTERVAL HPI/OVERNIGHT EVENTS:  No acute events.     SUBJECTIVE:   Patient seen and examined at bedside. HA improved. No acute complaints.    OBJECTIVE:    VITAL SIGNS:  ICU Vital Signs Last 24 Hrs  T(C): 36.6 (13 Mar 2021 04:30), Max: 37.2 (12 Mar 2021 10:06)  T(F): 97.8 (13 Mar 2021 04:30), Max: 98.9 (12 Mar 2021 10:06)  HR: 73 (12 Mar 2021 23:30) (72 - 92)  BP: 136/72 (12 Mar 2021 23:30) (124/56 - 170/74)  BP(mean): 99 (12 Mar 2021 23:30) (81 - 106)  ABP: --  ABP(mean): --  RR: 17 (12 Mar 2021 23:30) (17 - 17)  SpO2: 99% (12 Mar 2021 23:30) (96% - 100%)        03-12 @ 07:01  -  03-13 @ 07:00  --------------------------------------------------------  IN: 350 mL / OUT: 1800 mL / NET: -1450 mL      CAPILLARY BLOOD GLUCOSE          PHYSICAL EXAM:    General: NAD; Lying comfortably in bed  HEENT: NC/AT; PERRL, clear conjunctiva; MMM; moustache dressing in place, clean/dry  Neck: Supple. No JVD or thyromegaly   Respiratory: CTA b/l. No wheezes, rhonchi, rales.  Cardiovascular: +S1/S2; RRR; No murmurs, rubs, gallops.  Abdomen: soft, NT/ND; +BS x4  Extremities: WWP, 2+ peripheral pulses b/l; no LE edema  Skin: normal color and turgor; no rash  Neurological: AOx3. CN 2-12 grossly intact.    MEDICATIONS:  MEDICATIONS  (STANDING):  aspirin  chewable 81 milliGRAM(s) Oral daily  atorvastatin 80 milliGRAM(s) Oral at bedtime  cephalexin 500 milliGRAM(s) Oral every 12 hours  clopidogrel Tablet 75 milliGRAM(s) Oral daily  enoxaparin Injectable 40 milliGRAM(s) SubCutaneous every 24 hours  oxymetazoline 0.05% Nasal Spray 1 Spray(s) Both Nostrils every 12 hours  sodium chloride 0.9%. 1000 milliLiter(s) (100 mL/Hr) IV Continuous <Continuous>    MEDICATIONS  (PRN):  acetaminophen   Tablet .. 650 milliGRAM(s) Oral every 6 hours PRN Temp greater or equal to 38C (100.4F), Mild Pain (1 - 3)      ALLERGIES:  Allergies    apple (Unknown)  No Known Drug Allergies    Intolerances        LABS:                        11.3   6.78  )-----------( 231      ( 13 Mar 2021 06:46 )             34.9     03-13    140  |  105  |  11  ----------------------------<  x   3.9   |  26  |  0.64    Ca    9.0      13 Mar 2021 06:32  Phos  2.9     03-13  Mg     2.1     03-13            RADIOLOGY & ADDITIONAL TESTS: Reviewed.

## 2021-03-13 NOTE — PROGRESS NOTE ADULT - SUBJECTIVE AND OBJECTIVE BOX
SUBJECTIVE / INTERVAL HPI: Patient seen and examined at bedside. SHADI, MRI not performed yet. LUE pain resolved, states bleeding has stopped/decreased in her nose. Asking if she can be discharged today if MRI negative.     VITAL SIGNS:  Vital Signs Last 24 Hrs  T(C): 36.4 (13 Mar 2021 08:51), Max: 37 (12 Mar 2021 22:10)  T(F): 97.5 (13 Mar 2021 08:51), Max: 98.6 (12 Mar 2021 22:10)  HR: 79 (13 Mar 2021 08:51) (73 - 92)  BP: 102/59 (13 Mar 2021 08:51) (102/59 - 139/66)  BP(mean): 79 (13 Mar 2021 08:51) (79 - 99)  RR: 18 (13 Mar 2021 08:51) (17 - 18)  SpO2: 98% (13 Mar 2021 08:51) (98% - 99%)    PHYSICAL EXAM:    General: WDWN,   HEENT: nasal packing in nose w/ dry blood, mild bruising   Neck: supple  Cardiovascular: +S1/S2; RRR  Respiratory: CTA b/l; no W/R/R  Gastrointestinal: soft, NT/ND; +BSx4  Extremities: WWP;   Neurological: AAOx3; no focal deficits, strength 5/5    MEDICATIONS:  MEDICATIONS  (STANDING):  aspirin  chewable 81 milliGRAM(s) Oral daily  atorvastatin 80 milliGRAM(s) Oral at bedtime  cephalexin 500 milliGRAM(s) Oral every 12 hours  clopidogrel Tablet 75 milliGRAM(s) Oral daily  enoxaparin Injectable 40 milliGRAM(s) SubCutaneous every 24 hours  oxymetazoline 0.05% Nasal Spray 1 Spray(s) Both Nostrils every 12 hours    MEDICATIONS  (PRN):  acetaminophen   Tablet .. 650 milliGRAM(s) Oral every 6 hours PRN Temp greater or equal to 38C (100.4F), Mild Pain (1 - 3)      ALLERGIES:  Allergies    apple (Unknown)  No Known Drug Allergies    Intolerances        LABS:                        11.3   6.78  )-----------( 231      ( 13 Mar 2021 06:46 )             34.9     03-13    140  |  105  |  11  ----------------------------<  115<H>  3.9   |  26  |  0.64    Ca    9.0      13 Mar 2021 06:32  Phos  2.9     03-13  Mg     2.1     03-13          CAPILLARY BLOOD GLUCOSE          RADIOLOGY & ADDITIONAL TESTS: Reviewed.    ASSESSMENT:    PLAN:

## 2021-03-13 NOTE — PROGRESS NOTE ADULT - ATTENDING COMMENTS
Patient seen today with neuro ACP and resident team.       I was physically present for the key portions of the evaluation and management (E/M) service provided.  I agree with the above history, physical, and plan with the following additions/modifications     Suspect new ischemic stroke, left hemipareis much improved. PEnding MRI to confirm, empiric DAPT for now, nasal bleeding resolved.       Rigoberto Fierro MD  Attending Neurointensivist
Yari Montoya: 60 yr h/o HTN, HLP, CAD, chronic pain, RADHA on CPAP; presented after OP septoplasty(LKW-8 am-OOW for thrombolytics, came out of the OR by 9 am) for RADHA 3/10, SOB/left arm pain/CP/gradually progressive headache. CTH/CTP/CTA h/n-mild diffuse athero+. TTE-65%, no PFO. Neuro exam: AAO x3, CN 2-12 grossly intact, motor-LUE-drift+ without pronation(5/5 with individual muscle group testing), LLE-drift+, sensory-normal, gait-deferred.   Evaluate for right hemispheric stroke from SVID vs. athero vs. cardioemboli   –check MRI brain-short stroke protocol, aspirin+Plavix, keep SBP<180 for now
Yari Montoya: 60 yr h/o HTN, HLP, CAD, chronic pain; presented after OP septoplasty(LKW-8 am-OOW for thrombolytics, came out of the OR by 9 am) for RADHA 3/10, SOB/left arm pain/CP/gradually progressive headache. CTH/CTP/CTA h/n-mild diffuse athero+. TTE-65%, no PFO. Neuro exam: AAO x3, CN 2-12 grossly intact, motor-LUE-drift+ without pronation(5/5 with individual muscle group testing), LLE-drift+, sensory-mild decrease on the left, gait-deferred.   Evaluate for right hemispheric stroke from SVID vs. athero vs. cardioemboli   –check MRI brain-short stroke protocol, GABINO, aspirin+Plavix daily (after initial loads), keep SBP<200 for now

## 2021-03-13 NOTE — PROGRESS NOTE ADULT - SUBJECTIVE AND OBJECTIVE BOX
60F w/ hx HTN, HLD, asthma here with CP and SOB following septoplasty at [facility] 3/10; ENT consulted for packing removal. Pt states overall she feels better since coming in to hospital. Some oozing around packing.     Interval 3/12: NAEON. Minimal bleeding from nose. Hgb stable  3/13: NAEON, no bleeding, some packing dissolving, HGB stable    PMH: as above  PSx: 3/10/21: septoplasty, BITR, devon bullosa resection, with b/l telfa packs and L merocel in place    PE:  Gen: NAD  Nose: R nare with surgicel wrapped surgifoam, L nare with surgicel wrapped surgifoam and 8cm merocel   OC/OP: minimal oozing down posterior OP  Neck: supple  Resp: NLB on RA    Hgb/Hct  3/12: 10.8/34  3/11 (6am): 11.5/36 -> (11pm) 10.7/33    A/P:  60F here with post-op epistaxis. Currently with absorbable surgicel/surgifoam and non-absorbable merocel on L.    -Please have afrin available at bedside  -HOB elevated 30 deg  -Replace mustache dressing as needed. Notify ENT if needing to be replaced more frequently than q1hour  -Pain control  -BP control   -AC/AP as per primary. If no longer indicated (pending further imaging/eval), consider discontinuing  -Recommend anti-staph abx while packing in place - ordered  -Patient will follow with ENT Dr. Rudy Blackmon on Tuesday 3/16 for packing removal   -Page ENT with questions/concerns

## 2021-03-14 LAB
ANION GAP SERPL CALC-SCNC: 9 MMOL/L — SIGNIFICANT CHANGE UP (ref 5–17)
BUN SERPL-MCNC: 14 MG/DL — SIGNIFICANT CHANGE UP (ref 7–23)
CALCIUM SERPL-MCNC: 9 MG/DL — SIGNIFICANT CHANGE UP (ref 8.4–10.5)
CHLORIDE SERPL-SCNC: 105 MMOL/L — SIGNIFICANT CHANGE UP (ref 96–108)
CO2 SERPL-SCNC: 25 MMOL/L — SIGNIFICANT CHANGE UP (ref 22–31)
CREAT SERPL-MCNC: 0.62 MG/DL — SIGNIFICANT CHANGE UP (ref 0.5–1.3)
GLUCOSE SERPL-MCNC: 116 MG/DL — HIGH (ref 70–99)
HCT VFR BLD CALC: 35 % — SIGNIFICANT CHANGE UP (ref 34.5–45)
HGB BLD-MCNC: 11.3 G/DL — LOW (ref 11.5–15.5)
MAGNESIUM SERPL-MCNC: 2.1 MG/DL — SIGNIFICANT CHANGE UP (ref 1.6–2.6)
MCHC RBC-ENTMCNC: 30.3 PG — SIGNIFICANT CHANGE UP (ref 27–34)
MCHC RBC-ENTMCNC: 32.3 GM/DL — SIGNIFICANT CHANGE UP (ref 32–36)
MCV RBC AUTO: 93.8 FL — SIGNIFICANT CHANGE UP (ref 80–100)
NRBC # BLD: 0 /100 WBCS — SIGNIFICANT CHANGE UP (ref 0–0)
PHOSPHATE SERPL-MCNC: 3.7 MG/DL — SIGNIFICANT CHANGE UP (ref 2.5–4.5)
PLATELET # BLD AUTO: 243 K/UL — SIGNIFICANT CHANGE UP (ref 150–400)
POTASSIUM SERPL-MCNC: 3.9 MMOL/L — SIGNIFICANT CHANGE UP (ref 3.5–5.3)
POTASSIUM SERPL-SCNC: 3.9 MMOL/L — SIGNIFICANT CHANGE UP (ref 3.5–5.3)
RBC # BLD: 3.73 M/UL — LOW (ref 3.8–5.2)
RBC # FLD: 12.6 % — SIGNIFICANT CHANGE UP (ref 10.3–14.5)
SODIUM SERPL-SCNC: 139 MMOL/L — SIGNIFICANT CHANGE UP (ref 135–145)
T3 SERPL-MCNC: 105 NG/DL — SIGNIFICANT CHANGE UP (ref 80–200)
T4 FREE SERPL-MCNC: 1.18 NG/DL — SIGNIFICANT CHANGE UP (ref 0.7–1.48)
WBC # BLD: 6.48 K/UL — SIGNIFICANT CHANGE UP (ref 3.8–10.5)
WBC # FLD AUTO: 6.48 K/UL — SIGNIFICANT CHANGE UP (ref 3.8–10.5)

## 2021-03-14 PROCEDURE — 70551 MRI BRAIN STEM W/O DYE: CPT | Mod: 26

## 2021-03-14 PROCEDURE — 99233 SBSQ HOSP IP/OBS HIGH 50: CPT

## 2021-03-14 RX ADMIN — Medication 650 MILLIGRAM(S): at 06:48

## 2021-03-14 RX ADMIN — Medication 81 MILLIGRAM(S): at 12:55

## 2021-03-14 RX ADMIN — CLOPIDOGREL BISULFATE 75 MILLIGRAM(S): 75 TABLET, FILM COATED ORAL at 12:55

## 2021-03-14 RX ADMIN — OXYMETAZOLINE HYDROCHLORIDE 1 SPRAY(S): 0.5 SPRAY NASAL at 06:16

## 2021-03-14 RX ADMIN — Medication 500 MILLIGRAM(S): at 06:16

## 2021-03-14 RX ADMIN — ENOXAPARIN SODIUM 40 MILLIGRAM(S): 100 INJECTION SUBCUTANEOUS at 21:45

## 2021-03-14 RX ADMIN — ATORVASTATIN CALCIUM 80 MILLIGRAM(S): 80 TABLET, FILM COATED ORAL at 21:45

## 2021-03-14 RX ADMIN — OXYMETAZOLINE HYDROCHLORIDE 1 SPRAY(S): 0.5 SPRAY NASAL at 18:26

## 2021-03-14 RX ADMIN — Medication 650 MILLIGRAM(S): at 07:25

## 2021-03-14 RX ADMIN — Medication 500 MILLIGRAM(S): at 17:15

## 2021-03-14 NOTE — PROGRESS NOTE ADULT - PROBLEM SELECTOR PLAN 1
Patient presenting from [facility] s/p septoplasty with complaints of chest pain, shortness of breath, new onset L-sided weakness/numbness. NIHSS 5 on arrival with negative CTH, CTP, CTA head/neck.   - MR brain (ordered)  - sBP goal <180  - Passed bedside dysphagia  - ASA 81mg   - Atorvastatin 80mg Qhs  - PT recommending inpatient rehab\    #L sided weakness  -pt w/ chronic L sided weakness, in process of being worked up as outpatient  - f/u MR brain w/w/o contrast

## 2021-03-14 NOTE — PROGRESS NOTE ADULT - ASSESSMENT
59 yo F with PMHx HTN, HLD, asthma, RADHA (CPAP at night) presenting to [facility] from [facility] s/p septoplasty complaining of chest pain, LUE pain, and shortness of breath after procedural anesthesia. New onset L-sided weakness/numbness on arrival prompting stroke code with NIHSS 5 however negative imaging. Being stepped down for further management and work up.

## 2021-03-14 NOTE — PROGRESS NOTE ADULT - SUBJECTIVE AND OBJECTIVE BOX
TRANSFER STEPDOWN NOTE: [facility] TO [facility]     HOSPITAL COURSE:   Pt is a 61 yo F with PMH HTN, HLD, asthma, ?RADHA, and HA who p/f outpt surgical center after septoplasty with L CP, LUE pain, SOB since waking from anesthesia. Underwent septoplasty at [facility] and woke from anesthesia around 12pm with LUE pain, L CP, SOB with EKG WNL and given SL nitro/ASA with mild improvement in symptoms; noted to have BL wheezing and given 2x duonebs with improvement. Respiratory status complicated by nasal packing from post-op. Pt then began to develop HA and decision made to transport pt to ER. Of note, pt and pt's daughter both poor historians and information limited. On arrival to [facility], pt complaining of new L sided weakness/numbness prompting stroke code.On arrival, T 98.4, , /79, RR 18 O2sat 98% RA. EKG with NSR without ischemic changes. Labs with WBC 10.34, 94% neutrophils, Hb 11.4, MCV 94.7, lipid panel with , TSH 0.122, A1C 5.9, trop neg, BNP neg. Stroke code with NIHSS 5. CTH neg, CTA neg, CTP neg, CTA PE neg. Admitted to  for further observation and management. CT brain negative for stroke. ENT called for nasal packing. Restarted ASA. Plavix 300 loaded, continued on plavix 75 mg. TTE negative on 3/12 called to bedside about chest pain. 12 lead without changes, cardiac enzymes wnl. Started keflex til tuesday. Restarted diet. Given persistent L sided weakness patient ordered for MRI brain and c-spine w/w/o contrast. Patient is HD stable and now ready for stepdown to [facility] for further workup of L sided weakness.     OVERNIGHT EVENTS: No acute events.     SUBJECTIVE / INTERVAL HPI: Patient seen and examined at bedside. C/o 8/10 headache, and persistent L sided numbness and weakness. Per further hx obtained L sided weakness is chronic and was being worked up as outpatient several months ago, but on hold dt covid. ROS otherwise negative.     VITAL SIGNS:  Vital Signs Last 24 Hrs  T(C): 36.8 (14 Mar 2021 17:45), Max: 37 (14 Mar 2021 13:49)  T(F): 98.3 (14 Mar 2021 17:45), Max: 98.6 (14 Mar 2021 13:49)  HR: 77 (14 Mar 2021 17:31) (71 - 82)  BP: 134/63 (14 Mar 2021 17:31) (124/58 - 138/65)  BP(mean): 91 (14 Mar 2021 17:31) (83 - 94)  RR: 17 (14 Mar 2021 17:31) (14 - 18)  SpO2: 97% (14 Mar 2021 17:31) (95% - 99%)    03-13-21 @ 06:01  -  03-14-21 @ 07:00  --------------------------------------------------------  IN: 1250 mL / OUT: 1400 mL / NET: -150 mL        PHYSICAL EXAM:    General: NAD; Lying comfortably in bed  HEENT: NC/AT; PERRL, clear conjunctiva; MMM; moustache dressing in place, clean/dry  Neck: Supple. No JVD or thyromegaly   Respiratory: CTA b/l. No wheezes, rhonchi, rales.  Cardiovascular: +S1/S2; RRR; No murmurs, rubs, gallops.  Abdomen: soft, NT/ND; +BS x4  Extremities: WWP, 2+ peripheral pulses b/l; no LE edema  Skin: normal color and turgor; no rash  Neurological: AOx3. CN 2-12 grossly intact.    MEDICATIONS:  MEDICATIONS  (STANDING):  atorvastatin 80 milliGRAM(s) Oral at bedtime  cephalexin 500 milliGRAM(s) Oral every 12 hours  enoxaparin Injectable 40 milliGRAM(s) SubCutaneous every 24 hours  oxymetazoline 0.05% Nasal Spray 1 Spray(s) Both Nostrils every 12 hours    MEDICATIONS  (PRN):  acetaminophen   Tablet .. 650 milliGRAM(s) Oral every 6 hours PRN Temp greater or equal to 38C (100.4F), Mild Pain (1 - 3)      ALLERGIES:  Allergies    apple (Unknown)  No Known Drug Allergies    Intolerances        LABS:                        11.3   6.48  )-----------( 243      ( 14 Mar 2021 07:34 )             35.0     03-14    139  |  105  |  14  ----------------------------<  116<H>  3.9   |  25  |  0.62    Ca    9.0      14 Mar 2021 07:34  Phos  3.7     03-14  Mg     2.1     03-14          CAPILLARY BLOOD GLUCOSE            RADIOLOGY & ADDITIONAL TESTS: Reviewed.

## 2021-03-14 NOTE — PROGRESS NOTE ADULT - PROBLEM SELECTOR PLAN 3
Patient presenting with SOB and hypoxic to low 90s on RA after procedural anesthesia s/p septoplasty. Hypoxia likely in setting of b/l nasal packing in place. Placed on NRB with improvement in O2 sat, now transitioned to RA. CXR without evidence of new consolidation/infiltrate.   - Now off O2 on RA, resolved.

## 2021-03-14 NOTE — PROGRESS NOTE ADULT - PROBLEM SELECTOR PLAN 2
[facility] nasal packing in B/L nares. Moustache dressing in place. Nasal packing to remain until f/u with ENT.  - Keflex for Staph ppx while nasal packing place   - Patient to f/u with ENT as outpatient  - ENT consulted, recs appreciated

## 2021-03-14 NOTE — PROGRESS NOTE ADULT - PROBLEM SELECTOR PLAN 10
F: None  E: Replete PRN  N: Diet, DASH/TLC  DVT ppx: Lovenox  GI ppx: None  Bowel: None  Dispo: [facility] to F     FULL CODE

## 2021-03-14 NOTE — PROGRESS NOTE ADULT - ASSESSMENT
60 year old female with,    # LUE pain/left side weakness: improved yesterday but feels weaker today, MRI pending, occurring post-op, CT negative for acute infarct   -C/w ASA/plavix/statin for now per stroke, PT/OT evals --> inpatient rehab pending further imaging/progress.   -DVT PPx with LMWH.    #Left neck pain: likely related to recent septoplasty, f/u ENT recs, c/w pain control    # HTN: goal <180 per stroke, hold home anti-HTN    # Asthma: not in exacerbation,     # S/p septoplasty 3/10: f/u ENT recs, bleeding seems to have stopped, outpatient f/u 3/16, on keflex for ppx    # Post-op anemia: likely d/t recent procedure plus DAPT. Keep Hg above 7.     # Sleep apnea: C/w CPAP at bedtime.     # Hyperlipidemia: c/w statin.     # Low TSH: with normal FT4/T4. Recommend to repeat TFT's in 4 weeks as outpatient.    # Pre-diabetes: with A1C of 5.9. Monitor BS for now.    # BMI of 31.7 --> Obesity.

## 2021-03-14 NOTE — PROGRESS NOTE ADULT - PROBLEM SELECTOR PLAN 1
Patient presenting from [facility] s/p septoplasty with complaints of chest pain, shortness of breath, new onset L-sided weakness/numbness. NIHSS 5 on arrival with negative CTH, CTP, CTA head/neck.   - MR brain (ordered)  - sBP goal <180  - Passed bedside dysphagia  - ASA 81mg   - Atorvastatin 80mg Qhs  - PT recommending inpatient rehab, pending placement.

## 2021-03-14 NOTE — PROGRESS NOTE ADULT - ASSESSMENT
59 yo F with PMHx HTN, HLD, asthma, RADHA (CPAP at night) presenting to [facility] from [facility] s/p septoplasty complaining of chest pain, LUE pain, and shortness of breath after procedural anesthesia. New onset L-sided weakness/numbness on arrival prompting stroke code with NIHSS 5 however negative imaging. Patient is HD stable and now ready for stepdown to F for further workup of L sided weakness.

## 2021-03-14 NOTE — PROGRESS NOTE ADULT - SUBJECTIVE AND OBJECTIVE BOX
OVERNIGHT EVENTS: SHADI    SUBJECTIVE / INTERVAL HPI: Patient seen and examined at bedside. MRI not performed. This morning she feels some dizziness and overall weaker than yesterday. Believes her left arm weakness is back today so worse than yesterday. She is also complaining of pain on the left side of her neck. Bleeding improved.     VITAL SIGNS:  Vital Signs Last 24 Hrs  T(C): 36.6 (14 Mar 2021 09:18), Max: 37.1 (13 Mar 2021 17:24)  T(F): 97.9 (14 Mar 2021 09:18), Max: 98.8 (13 Mar 2021 17:24)  HR: 76 (14 Mar 2021 08:31) (71 - 82)  BP: 138/65 (14 Mar 2021 08:31) (116/57 - 138/65)  BP(mean): 93 (14 Mar 2021 08:31) (80 - 94)  RR: 17 (14 Mar 2021 08:31) (14 - 18)  SpO2: 96% (14 Mar 2021 08:31) (96% - 99%)    PHYSICAL EXAM:    General: WDWN, mildly uncomfortable  HEENT: nasal packing in nose w/ dry blood, mild bruising   Neck: supple  Cardiovascular: +S1/S2; RRR  Respiratory: CTA b/l; no W/R/R  Gastrointestinal: soft, NT/ND; +BSx4  Extremities: WWP;   Neurological: AAOx3; no focal deficits, LUE 4+/5 strength (5/5 yesterday)    MEDICATIONS:  MEDICATIONS  (STANDING):  aspirin  chewable 81 milliGRAM(s) Oral daily  atorvastatin 80 milliGRAM(s) Oral at bedtime  cephalexin 500 milliGRAM(s) Oral every 12 hours  clopidogrel Tablet 75 milliGRAM(s) Oral daily  enoxaparin Injectable 40 milliGRAM(s) SubCutaneous every 24 hours  oxymetazoline 0.05% Nasal Spray 1 Spray(s) Both Nostrils every 12 hours    MEDICATIONS  (PRN):  acetaminophen   Tablet .. 650 milliGRAM(s) Oral every 6 hours PRN Temp greater or equal to 38C (100.4F), Mild Pain (1 - 3)      ALLERGIES:  Allergies    apple (Unknown)  No Known Drug Allergies    Intolerances        LABS:                        11.3   6.48  )-----------( 243      ( 14 Mar 2021 07:34 )             35.0     03-14    139  |  105  |  14  ----------------------------<  116<H>  3.9   |  25  |  0.62    Ca    9.0      14 Mar 2021 07:34  Phos  3.7     03-14  Mg     2.1     03-14          CAPILLARY BLOOD GLUCOSE          RADIOLOGY & ADDITIONAL TESTS: Reviewed.    ASSESSMENT:    PLAN:

## 2021-03-14 NOTE — PROGRESS NOTE ADULT - PROBLEM SELECTOR PLAN 2
[facility] packing in B/L nares. Moustache dressing in place. Nasal packing to remain until f/u with ENT.  - Keflex for Staph ppx while nasal packing place   - Patient to f/u with ENT as outpatient  - ENT consulted, recs appreciated

## 2021-03-14 NOTE — PROGRESS NOTE ADULT - SUBJECTIVE AND OBJECTIVE BOX
TRANSFER NOTE ACCEPTANCE [facility] TO [facility]    RAYNA NEGRON, 60y, Female  MRN-[id number]  Patient is a 60y old  Female who presents with a chief complaint of CP, SOB, L side weakness (14 Mar 2021 10:26)      HOSPITAL COURSE:     SUBJECTIVE: Pt seen and examined at bedside. Not complaining of headache at this time.    12 Point ROS Negative unless noted otherwise above.  -------------------------------------------------------------------------------  VITAL SIGNS:  Vital Signs Last 24 Hrs  T(C): 36.8 (14 Mar 2021 17:45), Max: 37 (14 Mar 2021 13:49)  T(F): 98.3 (14 Mar 2021 17:45), Max: 98.6 (14 Mar 2021 13:49)  HR: 77 (14 Mar 2021 17:31) (71 - 82)  BP: 134/63 (14 Mar 2021 17:31) (124/58 - 138/65)  BP(mean): 91 (14 Mar 2021 17:31) (83 - 94)  RR: 17 (14 Mar 2021 17:31) (14 - 18)  SpO2: 97% (14 Mar 2021 17:31) (95% - 99%)  I&O's Summary    13 Mar 2021 06:01  -  14 Mar 2021 07:00  --------------------------------------------------------  IN: 1250 mL / OUT: 1400 mL / NET: -150 mL        PHYSICAL EXAM:  General: NAD; Lying comfortably in bed  HEENT: NC/AT; PERRL, clear conjunctiva; MMM; moustache dressing in place, clean/dry  Neck: Supple. No JVD or thyromegaly   Respiratory: CTA b/l. No wheezes, rhonchi, rales.  Cardiovascular: +S1/S2; RRR; No murmurs, rubs, gallops.  Abdomen: soft, NT/ND; +BS x4  Extremities: WWP, 2+ peripheral pulses b/l; no LE edema  Skin: normal color and turgor; no rash  Neurological: AOx3. CN 2-12 grossly intact. L sided pronator drift, LUE and LLE 4/5. Sensation intact bilaterally.       ALLERGIES:  Allergies    apple (Unknown)  No Known Drug Allergies    Intolerances        MEDICATIONS:  MEDICATIONS  (STANDING):  atorvastatin 80 milliGRAM(s) Oral at bedtime  cephalexin 500 milliGRAM(s) Oral every 12 hours  enoxaparin Injectable 40 milliGRAM(s) SubCutaneous every 24 hours  oxymetazoline 0.05% Nasal Spray 1 Spray(s) Both Nostrils every 12 hours    MEDICATIONS  (PRN):  acetaminophen   Tablet .. 650 milliGRAM(s) Oral every 6 hours PRN Temp greater or equal to 38C (100.4F), Mild Pain (1 - 3)      -------------------------------------------------------------------------------  LABS:                        11.3   6.48  )-----------( 243      ( 14 Mar 2021 07:34 )             35.0     03-14    139  |  105  |  14  ----------------------------<  116<H>  3.9   |  25  |  0.62    Ca    9.0      14 Mar 2021 07:34  Phos  3.7     03-14  Mg     2.1     03-14            CAPILLARY BLOOD GLUCOSE              RADIOLOGY & ADDITIONAL TESTS: Reviewed.   TRANSFER NOTE ACCEPTANCE [facility] TO [facility]    RAYNA NEGRON, 60y, Female  MRN-[id number]  Patient is a 60y old  Female who presents with a chief complaint of CP, SOB, L side weakness (14 Mar 2021 10:26)      HOSPITAL COURSE:   Pt is a 61 yo F with PMH HTN, HLD, asthma, ?RADHA, and HA who p/f outpt surgical center after septoplasty with L CP, LUE pain, SOB since waking from anesthesia. Underwent septoplasty at [facility] and woke from anesthesia around 12pm with LUE pain, L CP, SOB with EKG WNL and given SL nitro/ASA with mild improvement in symptoms; noted to have BL wheezing and given 2x duonebs with improvement. Respiratory status complicated by nasal packing from post-op. Pt then began to develop HA and decision made to transport pt to ER. Of note, pt and pt's daughter both poor historians and information limited. On arrival to [facility], pt complaining of new L sided weakness/numbness prompting stroke code.On arrival, T 98.4, , /79, RR 18 O2sat 98% RA. EKG with NSR without ischemic changes. Labs with WBC 10.34, 94% neutrophils, Hb 11.4, MCV 94.7, lipid panel with , TSH 0.122, A1C 5.9, trop neg, BNP neg. Stroke code with NIHSS 5. CTH neg, CTA neg, CTP neg, CTA PE neg. Admitted to  for further observation and management. CT brain negative for stroke. ENT called for nasal packing. Restarted ASA. Plavix 300 loaded, continued on plavix 75 mg. TTE negative on 3/12 called to bedside about chest pain. 12 lead without changes, cardiac enzymes wnl. Started keflex til tuesday. Restarted diet. Given persistent L sided weakness patient ordered for MRI brain and c-spine w/w/o contrast. Patient is HD stable and now ready for stepdown to [facility] for further workup of L sided weakness.     SUBJECTIVE: Pt seen and examined at bedside. Not complaining of headache at this time. As per tele team, per further hx obtained L sided weakness is chronic and was being worked up as outpatient several months ago, but on hold dt covid. ROS otherwise negative.       12 Point ROS Negative unless noted otherwise above.  -------------------------------------------------------------------------------  VITAL SIGNS:  Vital Signs Last 24 Hrs  T(C): 36.8 (14 Mar 2021 17:45), Max: 37 (14 Mar 2021 13:49)  T(F): 98.3 (14 Mar 2021 17:45), Max: 98.6 (14 Mar 2021 13:49)  HR: 77 (14 Mar 2021 17:31) (71 - 82)  BP: 134/63 (14 Mar 2021 17:31) (124/58 - 138/65)  BP(mean): 91 (14 Mar 2021 17:31) (83 - 94)  RR: 17 (14 Mar 2021 17:31) (14 - 18)  SpO2: 97% (14 Mar 2021 17:31) (95% - 99%)  I&O's Summary    13 Mar 2021 06:01  -  14 Mar 2021 07:00  --------------------------------------------------------  IN: 1250 mL / OUT: 1400 mL / NET: -150 mL        PHYSICAL EXAM:  General: NAD; Lying comfortably in bed  HEENT: NC/AT; PERRL, clear conjunctiva; MMM; moustache dressing in place, clean/dry  Neck: Supple. No JVD or thyromegaly   Respiratory: CTA b/l. No wheezes, rhonchi, rales.  Cardiovascular: +S1/S2; RRR; No murmurs, rubs, gallops.  Abdomen: soft, NT/ND; +BS x4  Extremities: WWP, 2+ peripheral pulses b/l; no LE edema  Skin: normal color and turgor; no rash  Neurological: AOx3. CN 2-12 grossly intact. L sided pronator drift, LUE and LLE 4/5. Sensation intact bilaterally.       ALLERGIES:  Allergies    apple (Unknown)  No Known Drug Allergies    Intolerances        MEDICATIONS:  MEDICATIONS  (STANDING):  atorvastatin 80 milliGRAM(s) Oral at bedtime  cephalexin 500 milliGRAM(s) Oral every 12 hours  enoxaparin Injectable 40 milliGRAM(s) SubCutaneous every 24 hours  oxymetazoline 0.05% Nasal Spray 1 Spray(s) Both Nostrils every 12 hours    MEDICATIONS  (PRN):  acetaminophen   Tablet .. 650 milliGRAM(s) Oral every 6 hours PRN Temp greater or equal to 38C (100.4F), Mild Pain (1 - 3)      -------------------------------------------------------------------------------  LABS:                        11.3   6.48  )-----------( 243      ( 14 Mar 2021 07:34 )             35.0     03-14    139  |  105  |  14  ----------------------------<  116<H>  3.9   |  25  |  0.62    Ca    9.0      14 Mar 2021 07:34  Phos  3.7     03-14  Mg     2.1     03-14            CAPILLARY BLOOD GLUCOSE              RADIOLOGY & ADDITIONAL TESTS: Reviewed.

## 2021-03-15 ENCOUNTER — TRANSCRIPTION ENCOUNTER (OUTPATIENT)
Age: 61
End: 2021-03-15

## 2021-03-15 VITALS
HEART RATE: 69 BPM | TEMPERATURE: 98 F | RESPIRATION RATE: 17 BRPM | OXYGEN SATURATION: 97 % | DIASTOLIC BLOOD PRESSURE: 75 MMHG | SYSTOLIC BLOOD PRESSURE: 131 MMHG

## 2021-03-15 PROBLEM — I10 ESSENTIAL (PRIMARY) HYPERTENSION: Chronic | Status: ACTIVE | Noted: 2021-03-11

## 2021-03-15 PROBLEM — E78.5 HYPERLIPIDEMIA, UNSPECIFIED: Chronic | Status: ACTIVE | Noted: 2021-03-11

## 2021-03-15 PROBLEM — Z00.00 ENCOUNTER FOR PREVENTIVE HEALTH EXAMINATION: Status: ACTIVE | Noted: 2021-03-15

## 2021-03-15 PROBLEM — J45.909 UNSPECIFIED ASTHMA, UNCOMPLICATED: Chronic | Status: ACTIVE | Noted: 2021-03-11

## 2021-03-15 LAB
ANION GAP SERPL CALC-SCNC: 9 MMOL/L — SIGNIFICANT CHANGE UP (ref 5–17)
BUN SERPL-MCNC: 10 MG/DL — SIGNIFICANT CHANGE UP (ref 7–23)
CALCIUM SERPL-MCNC: 9.5 MG/DL — SIGNIFICANT CHANGE UP (ref 8.4–10.5)
CHLORIDE SERPL-SCNC: 105 MMOL/L — SIGNIFICANT CHANGE UP (ref 96–108)
CO2 SERPL-SCNC: 28 MMOL/L — SIGNIFICANT CHANGE UP (ref 22–31)
CREAT SERPL-MCNC: 0.72 MG/DL — SIGNIFICANT CHANGE UP (ref 0.5–1.3)
GLUCOSE SERPL-MCNC: 108 MG/DL — HIGH (ref 70–99)
HCT VFR BLD CALC: 33.2 % — LOW (ref 34.5–45)
HGB BLD-MCNC: 10.5 G/DL — LOW (ref 11.5–15.5)
MAGNESIUM SERPL-MCNC: 2 MG/DL — SIGNIFICANT CHANGE UP (ref 1.6–2.6)
MCHC RBC-ENTMCNC: 29.9 PG — SIGNIFICANT CHANGE UP (ref 27–34)
MCHC RBC-ENTMCNC: 31.6 GM/DL — LOW (ref 32–36)
MCV RBC AUTO: 94.6 FL — SIGNIFICANT CHANGE UP (ref 80–100)
NRBC # BLD: 0 /100 WBCS — SIGNIFICANT CHANGE UP (ref 0–0)
PHOSPHATE SERPL-MCNC: 3.2 MG/DL — SIGNIFICANT CHANGE UP (ref 2.5–4.5)
PLATELET # BLD AUTO: 253 K/UL — SIGNIFICANT CHANGE UP (ref 150–400)
POTASSIUM SERPL-MCNC: 4.3 MMOL/L — SIGNIFICANT CHANGE UP (ref 3.5–5.3)
POTASSIUM SERPL-SCNC: 4.3 MMOL/L — SIGNIFICANT CHANGE UP (ref 3.5–5.3)
RBC # BLD: 3.51 M/UL — LOW (ref 3.8–5.2)
RBC # FLD: 12.6 % — SIGNIFICANT CHANGE UP (ref 10.3–14.5)
SODIUM SERPL-SCNC: 142 MMOL/L — SIGNIFICANT CHANGE UP (ref 135–145)
WBC # BLD: 7.86 K/UL — SIGNIFICANT CHANGE UP (ref 3.8–10.5)
WBC # FLD AUTO: 7.86 K/UL — SIGNIFICANT CHANGE UP (ref 3.8–10.5)

## 2021-03-15 PROCEDURE — 97535 SELF CARE MNGMENT TRAINING: CPT

## 2021-03-15 PROCEDURE — 97162 PT EVAL MOD COMPLEX 30 MIN: CPT

## 2021-03-15 PROCEDURE — 85730 THROMBOPLASTIN TIME PARTIAL: CPT

## 2021-03-15 PROCEDURE — 85027 COMPLETE CBC AUTOMATED: CPT

## 2021-03-15 PROCEDURE — 84480 ASSAY TRIIODOTHYRONINE (T3): CPT

## 2021-03-15 PROCEDURE — 71275 CT ANGIOGRAPHY CHEST: CPT

## 2021-03-15 PROCEDURE — 84100 ASSAY OF PHOSPHORUS: CPT

## 2021-03-15 PROCEDURE — 82746 ASSAY OF FOLIC ACID SERUM: CPT

## 2021-03-15 PROCEDURE — 97530 THERAPEUTIC ACTIVITIES: CPT

## 2021-03-15 PROCEDURE — 99233 SBSQ HOSP IP/OBS HIGH 50: CPT

## 2021-03-15 PROCEDURE — 70496 CT ANGIOGRAPHY HEAD: CPT

## 2021-03-15 PROCEDURE — 83735 ASSAY OF MAGNESIUM: CPT

## 2021-03-15 PROCEDURE — 80061 LIPID PANEL: CPT

## 2021-03-15 PROCEDURE — 82553 CREATINE MB FRACTION: CPT

## 2021-03-15 PROCEDURE — 70450 CT HEAD/BRAIN W/O DYE: CPT

## 2021-03-15 PROCEDURE — 82550 ASSAY OF CK (CPK): CPT

## 2021-03-15 PROCEDURE — 97116 GAIT TRAINING THERAPY: CPT

## 2021-03-15 PROCEDURE — 83036 HEMOGLOBIN GLYCOSYLATED A1C: CPT

## 2021-03-15 PROCEDURE — 92523 SPEECH SOUND LANG COMPREHEN: CPT

## 2021-03-15 PROCEDURE — 84484 ASSAY OF TROPONIN QUANT: CPT

## 2021-03-15 PROCEDURE — 82607 VITAMIN B-12: CPT

## 2021-03-15 PROCEDURE — 70498 CT ANGIOGRAPHY NECK: CPT

## 2021-03-15 PROCEDURE — 84439 ASSAY OF FREE THYROXINE: CPT

## 2021-03-15 PROCEDURE — 93005 ELECTROCARDIOGRAM TRACING: CPT

## 2021-03-15 PROCEDURE — 70551 MRI BRAIN STEM W/O DYE: CPT

## 2021-03-15 PROCEDURE — 84443 ASSAY THYROID STIM HORMONE: CPT

## 2021-03-15 PROCEDURE — 83550 IRON BINDING TEST: CPT

## 2021-03-15 PROCEDURE — 71045 X-RAY EXAM CHEST 1 VIEW: CPT

## 2021-03-15 PROCEDURE — 84436 ASSAY OF TOTAL THYROXINE: CPT

## 2021-03-15 PROCEDURE — 85025 COMPLETE CBC W/AUTO DIFF WBC: CPT

## 2021-03-15 PROCEDURE — 36415 COLL VENOUS BLD VENIPUNCTURE: CPT

## 2021-03-15 PROCEDURE — 99285 EMERGENCY DEPT VISIT HI MDM: CPT | Mod: 25

## 2021-03-15 PROCEDURE — 96365 THER/PROPH/DIAG IV INF INIT: CPT

## 2021-03-15 PROCEDURE — 86850 RBC ANTIBODY SCREEN: CPT

## 2021-03-15 PROCEDURE — 85610 PROTHROMBIN TIME: CPT

## 2021-03-15 PROCEDURE — 86900 BLOOD TYPING SEROLOGIC ABO: CPT

## 2021-03-15 PROCEDURE — 82728 ASSAY OF FERRITIN: CPT

## 2021-03-15 PROCEDURE — 83540 ASSAY OF IRON: CPT

## 2021-03-15 PROCEDURE — 84466 ASSAY OF TRANSFERRIN: CPT

## 2021-03-15 PROCEDURE — 86901 BLOOD TYPING SEROLOGIC RH(D): CPT

## 2021-03-15 PROCEDURE — 82962 GLUCOSE BLOOD TEST: CPT

## 2021-03-15 PROCEDURE — 86803 HEPATITIS C AB TEST: CPT

## 2021-03-15 PROCEDURE — 0042T: CPT

## 2021-03-15 PROCEDURE — 96366 THER/PROPH/DIAG IV INF ADDON: CPT

## 2021-03-15 PROCEDURE — 80048 BASIC METABOLIC PNL TOTAL CA: CPT

## 2021-03-15 PROCEDURE — 96375 TX/PRO/DX INJ NEW DRUG ADDON: CPT

## 2021-03-15 PROCEDURE — 93306 TTE W/DOPPLER COMPLETE: CPT

## 2021-03-15 PROCEDURE — 80053 COMPREHEN METABOLIC PANEL: CPT

## 2021-03-15 PROCEDURE — 83880 ASSAY OF NATRIURETIC PEPTIDE: CPT

## 2021-03-15 RX ORDER — CEPHALEXIN 500 MG
1 CAPSULE ORAL
Qty: 2 | Refills: 0
Start: 2021-03-15 | End: 2021-03-15

## 2021-03-15 RX ADMIN — Medication 500 MILLIGRAM(S): at 17:35

## 2021-03-15 RX ADMIN — Medication 650 MILLIGRAM(S): at 07:11

## 2021-03-15 RX ADMIN — Medication 650 MILLIGRAM(S): at 05:51

## 2021-03-15 RX ADMIN — Medication 500 MILLIGRAM(S): at 05:39

## 2021-03-15 NOTE — PROGRESS NOTE ADULT - PROBLEM SELECTOR PROBLEM 1
R/O CVA (cerebral vascular accident)

## 2021-03-15 NOTE — DISCHARGE NOTE PROVIDER - CARE PROVIDER_API CALL
Macho Cobb)  Neurology  [address] [place] [address]  Phone: [phone]  Fax: [phone]  Scheduled Appointment: 04/07/2021 01:00 PM   Macho Cobb)  Neurology  [address], [address] [place] [address]  Phone: [phone]  Fax: [phone]  Scheduled Appointment: 04/07/2021 01:00 PM    ALEXANDRO EDDY  [id number]  [address] [place] [address]  Phone: ([phone]  Fax: ([phone]  Scheduled Appointment: 03/16/2021

## 2021-03-15 NOTE — PROGRESS NOTE ADULT - SUBJECTIVE AND OBJECTIVE BOX
O/N: SHADI     SUBJECTIVE: Pt seen and examined at bedside. Feels well with no acute complaints. No worsening weakness, confusion, lightheadedness, numbness. No fever/chills, n/v/d, abd pain, CP/SOB. 12 pt ROS otherwise negative.     VITAL SIGNS:  Vital Signs Last 24 Hrs  T(C): 36.8 (14 Mar 2021 17:45), Max: 37 (14 Mar 2021 13:49)  T(F): 98.3 (14 Mar 2021 17:45), Max: 98.6 (14 Mar 2021 13:49)  HR: 77 (14 Mar 2021 17:31) (71 - 82)  BP: 134/63 (14 Mar 2021 17:31) (124/58 - 138/65)  BP(mean): 91 (14 Mar 2021 17:31) (83 - 94)  RR: 17 (14 Mar 2021 17:31) (14 - 18)  SpO2: 97% (14 Mar 2021 17:31) (95% - 99%)  I&O's Summary    13 Mar 2021 06:01  -  14 Mar 2021 07:00  --------------------------------------------------------  IN: 1250 mL / OUT: 1400 mL / NET: -150 mL        PHYSICAL EXAM:  General: NAD; Lying comfortably in bed  HEENT: NC/AT; PERRL, clear conjunctiva; MMM; moustache dressing in place, clean/dry  Neck: Supple. No JVD or thyromegaly   Respiratory: CTA b/l. No wheezes, rhonchi, rales.  Cardiovascular: +S1/S2; RRR; No murmurs, rubs, gallops.  Abdomen: soft, NT/ND; +BS x4  Extremities: WWP, 2+ peripheral pulses b/l; no LE edema  Skin: normal color and turgor; no rash  Neurological: AOx3. CN 2-12 grossly intact. L sided pronator drift, LUE and LLE 4/5. Right side full strength. Sensation intact bilaterally.       ALLERGIES:  Allergies    apple (Unknown)  No Known Drug Allergies    Intolerances        MEDICATIONS:  MEDICATIONS  (STANDING):  atorvastatin 80 milliGRAM(s) Oral at bedtime  cephalexin 500 milliGRAM(s) Oral every 12 hours  enoxaparin Injectable 40 milliGRAM(s) SubCutaneous every 24 hours  oxymetazoline 0.05% Nasal Spray 1 Spray(s) Both Nostrils every 12 hours    MEDICATIONS  (PRN):  acetaminophen   Tablet .. 650 milliGRAM(s) Oral every 6 hours PRN Temp greater or equal to 38C (100.4F), Mild Pain (1 - 3)      -------------------------------------------------------------------------------  LABS:                        11.3   6.48  )-----------( 243      ( 14 Mar 2021 07:34 )             35.0     03-14    139  |  105  |  14  ----------------------------<  116<H>  3.9   |  25  |  0.62    Ca    9.0      14 Mar 2021 07:34  Phos  3.7     03-14  Mg     2.1     03-14            CAPILLARY BLOOD GLUCOSE              RADIOLOGY & ADDITIONAL TESTS: Reviewed.

## 2021-03-15 NOTE — PROGRESS NOTE ADULT - PROBLEM SELECTOR PLAN 2
[facility] packing in B/L nares. Moustache dressing in place. Nasal packing to remain until f/u with ENT.  - Keflex for Staph ppx while nasal packing place   - Patient to f/u with ENT as outpatient  - ENT consulted, recs appreciated Patient presenting s/p septoplasty at [facility] day of admission with nasal packing in B/L nares. Moustache dressing in place. Nasal packing to remain until f/u with ENT.  - Keflex for Staph ppx while nasal packing place   -ENT to change dressing today 3/15   - Patient to f/u with ENT as outpatient

## 2021-03-15 NOTE — PROGRESS NOTE ADULT - REASON FOR ADMISSION
CP, SOB, L side weakness

## 2021-03-15 NOTE — DISCHARGE NOTE PROVIDER - HOSPITAL COURSE
#Discharge: do not delete    Patient is __ yo M/F with past medical history of _____  Presented with _____, found to have _____  Problem List/Main Diagnoses (system-based):   Inpatient treatment course:   New medications:   Labs to be followed outpatient:   Exam to be followed outpatient:    #Discharge: do not delete    59 yo F with PMHx HTN, HLD, asthma, RADHA (CPAP at night) presenting to [facility] from [facility] s/p septoplasty complaining of chest pain, LUE pain, and shortness of breath after procedural anesthesia, as well as new onset L-sided weakness/numbness on arrival prompting stroke code with NIHSS 5 however negative imaging.     Principal diagnosis: LUE weakness    Problem List/Main Diagnoses (system-based):   1. r/o Stroke - patient presented with new onset L sided numbness. Has had LUE weakness more chronically. NIHSS 5 on arrival.  -CTH, CTP, CTA head/neck negative   -MR brain negative   -get MR brain and cervical spine w/ w/o contrast outpatient     2. ENT complaint - presenting s/p septoplasty with nasal packing in BL nares.   -ENT changed nasal packing while inpt   -keflex for staph ppx while nasal packing in place  -f/u with ENT as outpt     3. HTN, HLD - c/w home meds     New medications: none  Labs to be followed outpatient: MR brain w/ contrast and MR cervical spine w/ contrast   Exam to be followed outpatient: none

## 2021-03-15 NOTE — PROGRESS NOTE ADULT - PROBLEM SELECTOR PROBLEM 6
Normocytic anemia

## 2021-03-15 NOTE — DISCHARGE NOTE PROVIDER - NSDCCPCAREPLAN_GEN_ALL_CORE_FT
PRINCIPAL DISCHARGE DIAGNOSIS  Diagnosis: Left arm weakness  Assessment and Plan of Treatment: The Left arm weakness and numbness that you have had is of unknown etiology. You had extensive imaging of your brain to check for a stroke, which was all negative. You had normal imaging studies. For this reason, you should follow up with the neurology doctors as directed on an outpatient basis to further manage your left arm weakness/numbness. You will require an MRI brain with contrast and an MR cervical spine with and without contrast to further determine the etiology of your numbness and weakness. Please continue your home medications as before.      SECONDARY DISCHARGE DIAGNOSES  Diagnosis: ENT complaint  Assessment and Plan of Treatment: You had nasal packing placed in your nose, after you had a septoplasty surgery. Please follow up with your ENT doctors as directed to further manage this condition. Continue the antibiotics until this nasal packing is removed. You have been prescribed 1 day's worth of antibiotics, since you have an appointment with ENT on 3/16.

## 2021-03-15 NOTE — DISCHARGE NOTE PROVIDER - NSDCMRMEDTOKEN_GEN_ALL_CORE_FT
atorvastatin:   gabapentin:   montelukast:   Norvasc:   pregabalin:   valsartan:    atorvastatin:   cephalexin 500 mg oral capsule: 1 cap(s) orally every 12 hours  gabapentin:   montelukast:   Norvasc:   pregabalin:   valsartan:

## 2021-03-15 NOTE — DIETITIAN INITIAL EVALUATION ADULT. - OTHER CALCULATIONS
Ideal body weight used for calculations as pt >120% of IBW. (155% IBW) Nutrient needs based on [facility] standards of care for maintenance in adults, adjusted for post-op needs, inflammation, age

## 2021-03-15 NOTE — PROGRESS NOTE ADULT - ASSESSMENT
59 yo F with PMHx HTN, HLD, asthma, RADHA (CPAP at night) presenting to [facility] from [facility] s/p septoplasty complaining of chest pain, LUE pain, and shortness of breath after procedural anesthesia. New onset L-sided weakness/numbness on arrival prompting stroke code with NIHSS 5 however negative imaging. Being stepped down for further management and work up.  59 yo F with PMHx HTN, HLD, asthma, RADHA (CPAP at night) presenting to [facility] from [facility] s/p septoplasty complaining of chest pain, LUE pain, and shortness of breath after procedural anesthesia. New onset L-sided weakness/numbness on arrival prompting stroke code with NIHSS 5 however negative imaging, currently pending placement.

## 2021-03-15 NOTE — PROGRESS NOTE ADULT - SUBJECTIVE AND OBJECTIVE BOX
Patient is a 60y old  Female who presents with a chief complaint of CP, SOB, L side weakness (15 Mar 2021 14:25)      INTERVAL HPI/OVERNIGHT EVENTS:  Seen by me this morning, doing better today. Tolerating PO. Improved numbness on R side.    Review of Systems: 12 point review of systems otherwise negative    MEDICATIONS  (STANDING):  atorvastatin 80 milliGRAM(s) Oral at bedtime  cephalexin 500 milliGRAM(s) Oral every 12 hours  enoxaparin Injectable 40 milliGRAM(s) SubCutaneous every 24 hours  oxymetazoline 0.05% Nasal Spray 1 Spray(s) Both Nostrils every 12 hours    MEDICATIONS  (PRN):  acetaminophen   Tablet .. 650 milliGRAM(s) Oral every 6 hours PRN Temp greater or equal to 38C (100.4F), Mild Pain (1 - 3)      Allergies    apple (Unknown)  No Known Drug Allergies    Intolerances          Vital Signs Last 24 Hrs  T(C): 36.9 (15 Mar 2021 12:32), Max: 36.9 (15 Mar 2021 06:07)  T(F): 98.4 (15 Mar 2021 12:32), Max: 98.4 (15 Mar 2021 06:07)  HR: 69 (15 Mar 2021 12:32) (69 - 77)  BP: 131/75 (15 Mar 2021 12:32) (124/62 - 143/76)  BP(mean): 91 (14 Mar 2021 17:31) (91 - 91)  RR: 17 (15 Mar 2021 12:32) (17 - 18)  SpO2: 97% (15 Mar 2021 12:32) (95% - 97%)  CAPILLARY BLOOD GLUCOSE            Physical Exam:    Daily     Daily   General:  Well appearing, NAD, not cachetic  HEENT: nasal packing in nose, no bleeding noted  CV:  RRR, no murmur, no JVD  Lungs:  CTA B/L, no wheezes, rales, rhonchi  Abdomen:  Soft, non-tender, no distended, positive BS  Extremities:  2+ pulses, no c/c, no edema  Skin:  Warm and dry, no rashes  :  No matute  Neuro:  Alert & Oriented X3, Good concentration; Motor Strength 5/5 B/L upper and lower extremities  No Restraints    LABS:                        10.5   7.86  )-----------( 253      ( 15 Mar 2021 07:11 )             33.2     03-15    142  |  105  |  10  ----------------------------<  108<H>  4.3   |  28  |  0.72    Ca    9.5      15 Mar 2021 07:11  Phos  3.2     03-15  Mg     2.0     03-15              RADIOLOGY & ADDITIONAL TESTS:  Reviewed by me

## 2021-03-15 NOTE — PROGRESS NOTE ADULT - PROBLEM SELECTOR PLAN 10
F: None  E: Replete PRN  N: Diet, DASH/TLC  DVT ppx: Lovenox  GI ppx: None  Bowel: None  Dispo: 7LA    FULL CODE F: None  E: Replete PRN  N: Diet, DASH/TLC  DVT ppx: Lovenox  GI ppx: None  Bowel: None  Dispo: [facility]     FULL CODE

## 2021-03-15 NOTE — DISCHARGE NOTE PROVIDER - CARE PROVIDERS DIRECT ADDRESSES
,[email or web address]

## 2021-03-15 NOTE — PROGRESS NOTE ADULT - PROBLEM SELECTOR PLAN 1
Patient presenting from [facility] s/p septoplasty with complaints of chest pain, shortness of breath, new onset L-sided weakness/numbness. NIHSS 5 on arrival with negative CTH, CTP, CTA head/neck.   - MR brain (ordered)  - sBP goal <180  - Passed bedside dysphagia  - ASA 81mg   - Atorvastatin 80mg Qhs  - PT recommending inpatient rehab, pending placement. Patient presenting from [facility] s/p septoplasty with complaints of chest pain, shortness of breath, new onset L-sided weakness/numbness. NIHSS 5 on arrival with negative CTH, CTP, CTA head/neck. MR brain negative. Pending dispo.  - sBP goal <180  - ASA 81mg   - Atorvastatin 80mg Qhs

## 2021-03-15 NOTE — PROGRESS NOTE ADULT - ASSESSMENT
60 year old female with,    # LUE pain/left side weakness: improved, MRI without e/o acute infarction, occurring post-op, CT negative for acute infarct.  MRI brain with contrast and MRI cervical spine are pending.  -C/w ASA/plavix/statin for now per stroke, PT/OT evals --> home with outpatient PT, no OT needs.  -DVT PPx with LMWH.    # Left neck pain: likely related to recent septoplasty, f/u ENT recs, c/w pain control.    # HTN: goal for normotension, can resume home meds as tolerated (valsaran/amlodipine)    # Asthma: not in exacerbation,     # S/p septoplasty 3/10: f/u ENT recs, bleeding seems to have stopped, outpatient f/u 3/16, on keflex for ppx    # Post-op anemia: likely d/t recent procedure plus DAPT. Keep Hg above 7.     # Sleep apnea: C/w CPAP at bedtime.     # Hyperlipidemia: c/w statin.     # Low TSH: with normal FT4/T4. Recommend to repeat TFT's in 4 weeks as outpatient.    # Pre-diabetes: with A1C of 5.9. Monitor BS for now.    # BMI of 31.7 --> Obesity.    D/w Stroke Team, will continue to follow up with you.  .

## 2021-03-15 NOTE — PROGRESS NOTE ADULT - PROBLEM SELECTOR PLAN 5
On admission, initial labs notable for TSH 0.122.  - Free T4 1.07 On admission, initial labs notable for TSH 0.122, though free T4 wnl.

## 2021-03-15 NOTE — DIETITIAN INITIAL EVALUATION ADULT. - REASON FOR ADMISSION
"   LOS: 3 days    Patient Care Team:  Neo Gresham DO as PCP - General (Family Medicine)    Chief Complaint: shortness of breath    Subjective     Interval History:   S/pthoractomy - intubated and sedated.     Review of Systems:   Unable to perform secondary to patient factor.    Objective     Vital Sign Min/Max for last 24 hours  Temp  Min: 97.4 °F (36.3 °C)  Max: 98.4 °F (36.9 °C)   BP  Min: 98/54  Max: 192/105   Pulse  Min: 56  Max: 112   Resp  Min: 12  Max: 18   SpO2  Min: 98 %  Max: 100 %   Flow (L/min)  Min: 2  Max: 2   Weight  Min: 97 lb (44 kg)  Max: 97 lb (44 kg)     Flowsheet Rows         First Filed Value    Admission Height  61\" (154.9 cm) Documented at 06/13/2017 0525    Admission Weight  97 lb (44 kg) Documented at 06/13/2017 0525             I/O last 3 completed shifts:  In: 2491.1 [P.O.:240; I.V.:1451.1; IV Piggyback:800]  Out: 178 [Other:178]    Physical Exam:     General Appearance:  Intubated and sedated.   Head:    Normocephalic, without obvious abnormality, atraumatic               Neck:   No adenopathy, supple, trachea midline, no thyromegaly, no     carotid bruit, no JVD       Lungs:     Clear to auscultation,respirations regular, even and                   unlabored    Heart:    Regular rhythm and normal rate, normal S1 and S2, no            murmur, no gallop, no rub, no click       Abdomen:     Normal bowel sounds, no masses, no organomegaly, soft        non-tender, non-distended, no guarding, no rebound                 tenderness       Extremities:   Moves all extremities well, no edema, no cyanosis, no              redness   Pulses:   Pulses palpable and equal bilaterally   Skin:   No bleeding, bruising or rash       Neurologic:   Intubated and sedated.        WBC WBC   Date Value Ref Range Status   06/15/2017 10.34 3.50 - 10.80 10*3/mm3 Final   06/14/2017 9.39 3.50 - 10.80 10*3/mm3 Final   06/13/2017 18.25 (H) 3.50 - 10.80 10*3/mm3 Final      HGB Hemoglobin   Date Value Ref Range " Status   06/15/2017 11.7 11.5 - 15.5 g/dL Final   06/14/2017 12.3 11.5 - 15.5 g/dL Final   06/13/2017 12.0 11.5 - 15.5 g/dL Final      HCT Hematocrit   Date Value Ref Range Status   06/15/2017 38.2 34.5 - 44.0 % Final   06/14/2017 38.2 34.5 - 44.0 % Final   06/13/2017 38.0 34.5 - 44.0 % Final      Platlets No results found for: LABPLAT   MCV MCV   Date Value Ref Range Status   06/15/2017 91.2 80.0 - 99.0 fL Final   06/14/2017 89.5 80.0 - 99.0 fL Final   06/13/2017 91.6 80.0 - 99.0 fL Final          Sodium Sodium   Date Value Ref Range Status   06/15/2017 132 132 - 146 mmol/L Final   06/14/2017 137 132 - 146 mmol/L Final   06/13/2017 138 132 - 146 mmol/L Final      Potassium Potassium   Date Value Ref Range Status   06/15/2017 5.6 (H) 3.5 - 5.5 mmol/L Final     Comment:     Result checked   06/14/2017 4.2 3.5 - 5.5 mmol/L Final   06/13/2017 4.4 3.5 - 5.5 mmol/L Final      Chloride Chloride   Date Value Ref Range Status   06/15/2017 102 99 - 109 mmol/L Final   06/14/2017 101 99 - 109 mmol/L Final   06/13/2017 99 99 - 109 mmol/L Final      CO2 CO2   Date Value Ref Range Status   06/15/2017 20.0 20.0 - 31.0 mmol/L Final   06/14/2017 22.0 20.0 - 31.0 mmol/L Final   06/13/2017 24.0 20.0 - 31.0 mmol/L Final      BUN BUN   Date Value Ref Range Status   06/15/2017 30 (H) 9 - 23 mg/dL Final   06/14/2017 46 (H) 9 - 23 mg/dL Final   06/13/2017 27 (H) 9 - 23 mg/dL Final      Creatinine Creatinine   Date Value Ref Range Status   06/15/2017 4.80 (H) 0.60 - 1.30 mg/dL Final   06/14/2017 7.10 (H) 0.60 - 1.30 mg/dL Final   06/13/2017 5.80 (H) 0.60 - 1.30 mg/dL Final      Calcium Calcium   Date Value Ref Range Status   06/15/2017 7.3 (L) 8.7 - 10.4 mg/dL Final   06/14/2017 7.7 (L) 8.7 - 10.4 mg/dL Final   06/13/2017 8.1 (L) 8.7 - 10.4 mg/dL Final      PO4 No results found for: CAPO4   Albumin Albumin   Date Value Ref Range Status   06/13/2017 3.70 3.20 - 4.80 g/dL Final      Magnesium Magnesium   Date Value Ref Range Status    06/15/2017 1.9 1.3 - 2.7 mg/dL Final      Uric Acid No results found for: URICACID        Results Review:     I reviewed the patient's new clinical results.      albuterol 10 mg Nebulization Once   amitriptyline 10 mg Oral Nightly   azithromycin 500 mg Intravenous Q24H   chlorhexidine 15 mL Mouth/Throat Q12H   dextrose 50 mL Intravenous Once   And      insulin regular 10 Units Intravenous Once   docusate sodium 100 mg Oral BID   ipratropium-albuterol 3 mL Nebulization 4x Daily - RT   lidocaine 2 patch Transdermal Q24H   minoxidil 10 mg Oral Daily   mupirocin  Topical Q12H   nicotine 1 patch Transdermal Daily   piperacillin-tazobactam 2.25 g Intravenous Q8H   sennosides-docusate sodium 2 tablet Oral Nightly   sodium polystyrene 30 g Rectal Once   vancomycin (dosing per levels)  Does not apply Daily       fentanyl 10 mcg/mL  mcg/hr Last Rate: 200 mcg/hr (06/15/17 0829)   lactated ringers 100 mL/hr Last Rate: 100 mL/hr (06/14/17 1241)   niCARdipine 5-15 mg/hr Last Rate: 5 mg/hr (06/14/17 1225)   Pharmacy to dose vancomycin     propofol 5-100 mcg/kg/min Last Rate: 100 mcg/kg/min (06/15/17 0846)   sodium chloride 30 mL/hr Last Rate: 30 mL/hr (06/15/17 0826)       Medication Review:     Assessment/Plan     Principal Problem:    Loculated pleural effusion  Active Problems:    Tobacco use    Bipolar disorder    Depression    Hyperparathyroidism    Pneumonia of right lung due to infectious organism    ESRD (end stage renal disease) on dialysis    Renovascular hypertension    Hepatitis C antibody positive in blood    IV drug abuse    Anemia of ESRD    COPD exacerbation    Medical non-compliance      1- ESRD - secondary to FSGS - on Dialysis MWF   2- Hep C antibody test positive - Non compliance and never treated as per notes.   3- Anemia of chronic disease. hgb at target   4- PNA with plueral loculation.   5- Volume overload.      Plan:  - HD per schedule.   - Kayxelate, D50 W with insulin for hyperkalemia  - UF as  tolerated.   - renal diet.   - Adjust meds per renal function    Thank you very much for the consult. Will follow along.      I discussed the patients findings and my recommendations with SALINA Lane MD  06/15/17  9:37 AM     CP, SOB, L side weakness

## 2021-03-15 NOTE — DIETITIAN INITIAL EVALUATION ADULT. - OTHER INFO
60F with PMH HTN, HLD, asthma, ?RADHA, and HA who p/f outpt surgical center after septoplasty with L CP, LUE pain, SOB since waking from anesthesia. Underwent septoplasty at [facility] and woke from anesthesia w LUE pain, noted to have BL wheezing. Respiratory status complicated by nasal packing from post-op. Pt then began to develop HA and decision made to transport pt to ER. Of note, pt and pt's daughter both poor historians and information limited. On arrival to [facility], pt complaining of new L sided weakness/numbness prompting stroke code. Stroke code with NIHSS 5. CTH neg, CTA neg, CTP neg, CTA PE neg. Admitted to  for further observation and management. CT brain negative for stroke. ENT called for nasal packing. TTE negative on 3/12 called to bedside about chest pain. Given persistent L sided weakness patient ordered for MRI brain and c-spine w/w/o contrast. Patient is HD stable and now ready for stepdown to [facility] for further workup of L sided weakness. As per tele team, per further hx obtained L sided weakness is chronic and was being worked up as outpatient several months ago, but on hold dt covid.  Remains on [facility] at this time for further w/u and management, on contact precautions for PUI, information obtained via team report, chart review and phone conversation. Has been on DASH det since 3/12 with fair- poor intake noted d/t nasal packing and pain. Reports no appetite which she believes d/t lack of smell. Denies n/v/d/c, chewing/ swallowing issues impacting intake, skin with real score 18, septoplasty incision site at nostril. Noted allergy to apples, otherwise follows regular diet. Believes wt has been stable. Encouraged intake of small frequent meals through day to best meet needs. Continues to be managed/ monitored at this time. Will follow per protocol.

## 2021-03-15 NOTE — DISCHARGE NOTE PROVIDER - PROVIDER TOKENS
PROVIDER:[TOKEN:[[id number]],SCHEDULEDAPPT:[04/07/2021],SCHEDULEDAPPTTIME:[01:00 PM]] PROVIDER:[TOKEN:[[id number]],SCHEDULEDAPPT:[04/07/2021],SCHEDULEDAPPTTIME:[01:00 PM]],PROVIDER:[TOKEN:[[id number]],SCHEDULEDAPPT:[03/16/2021]]

## 2021-03-15 NOTE — PROGRESS NOTE ADULT - PROBLEM SELECTOR PROBLEM 5
R/O Hyperthyroidism

## 2021-03-15 NOTE — DIETITIAN INITIAL EVALUATION ADULT. - ADD RECOMMEND
1. Encourage intake through day 2. Honor food preferences 3. Trend wts 4. Reinforce ed 5. Monitor and replete lytes

## 2021-03-15 NOTE — PROGRESS NOTE ADULT - PROBLEM SELECTOR PLAN 3
Patient presenting with SOB and hypoxic to low 90s on RA after procedural anesthesia s/p septoplasty. Hypoxia likely in setting of b/l nasal packing in place. Placed on NRB with improvement in O2 sat, now transitioned to RA. CXR without evidence of new consolidation/infiltrate.   - Now off O2 on RA, resolved. RESOLVED  Patient presenting with SOB and hypoxic to low 90s on RA after procedural anesthesia s/p septoplasty. Hypoxia likely in setting of b/l nasal packing in place. Placed on NRB with improvement in O2 sat, now transitioned to RA. CXR without evidence of new consolidation/infiltrate.   - Now off O2 on RA, resolved.

## 2021-03-15 NOTE — DISCHARGE NOTE NURSING/CASE MANAGEMENT/SOCIAL WORK - PATIENT PORTAL LINK FT
You can access the FollowMyHealth Patient Portal offered by [facility] by registering at the following website: [email or web address]. By joining [employer]’s FollowMyHealth portal, you will also be able to view your health information using other applications (apps) compatible with our system.

## 2021-03-15 NOTE — DIETITIAN INITIAL EVALUATION ADULT. - PROBLEM SELECTOR PLAN 3
- at [facility], pt with O2sat 90% on RA likely in the setting of nasal packing  - c/w NRB and wean as tolerated  - CXR neg  - exam without signs of respiratory distress  - continue to monitor

## 2021-03-25 DIAGNOSIS — J45.909 UNSPECIFIED ASTHMA, UNCOMPLICATED: ICD-10-CM
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
## 2021-03-25 DIAGNOSIS — R53.83 OTHER FATIGUE: ICD-10-CM

## 2021-03-25 DIAGNOSIS — R20.0 ANESTHESIA OF SKIN: ICD-10-CM

## 2021-03-25 DIAGNOSIS — G47.33 OBSTRUCTIVE SLEEP APNEA (ADULT) (PEDIATRIC): ICD-10-CM

## 2021-03-25 DIAGNOSIS — Z98.890 OTHER SPECIFIED POSTPROCEDURAL STATES: ICD-10-CM

## 2021-03-25 DIAGNOSIS — R07.9 CHEST PAIN, UNSPECIFIED: ICD-10-CM

## 2021-03-25 DIAGNOSIS — R51.9 HEADACHE, UNSPECIFIED: ICD-10-CM

## 2021-03-25 DIAGNOSIS — R73.03 PREDIABETES: ICD-10-CM

## 2021-03-25 DIAGNOSIS — E05.90 THYROTOXICOSIS, UNSPECIFIED WITHOUT THYROTOXIC CRISIS OR STORM: ICD-10-CM

## 2021-03-25 DIAGNOSIS — Z91.018 ALLERGY TO OTHER FOODS: ICD-10-CM

## 2021-03-25 DIAGNOSIS — D62 ACUTE POSTHEMORRHAGIC ANEMIA: ICD-10-CM

## 2021-03-25 DIAGNOSIS — E78.5 HYPERLIPIDEMIA, UNSPECIFIED: ICD-10-CM

## 2021-03-25 DIAGNOSIS — D72.829 ELEVATED WHITE BLOOD CELL COUNT, UNSPECIFIED: ICD-10-CM

## 2021-03-25 DIAGNOSIS — D64.9 ANEMIA, UNSPECIFIED: ICD-10-CM

## 2021-03-25 DIAGNOSIS — R29.898 OTHER SYMPTOMS AND SIGNS INVOLVING THE MUSCULOSKELETAL SYSTEM: ICD-10-CM

## 2021-03-25 DIAGNOSIS — R04.0 EPISTAXIS: ICD-10-CM

## 2021-03-25 DIAGNOSIS — R06.02 SHORTNESS OF BREATH: ICD-10-CM

## 2021-03-25 DIAGNOSIS — I10 ESSENTIAL (PRIMARY) HYPERTENSION: ICD-10-CM

## 2021-03-25 DIAGNOSIS — E66.9 OBESITY, UNSPECIFIED: ICD-10-CM
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
## 2021-03-26 DIAGNOSIS — Z71.89 OTHER SPECIFIED COUNSELING: ICD-10-CM

## 2021-04-07 ENCOUNTER — APPOINTMENT (OUTPATIENT)
Dept: NEUROLOGY | Facility: CLINIC | Age: 61
End: 2021-04-07

## 2021-06-01 PROCEDURE — G9005: CPT

## 2021-08-04 NOTE — H&P ADULT - ASSESSMENT
60y Female with PMHx of HTN, asthma, high cholesterol, who presents after outpt septoplasty at [facility] 3/10 c/o left side chest and arm pain associated with SOB. Patient on arrival to [facility] ED was lethargic from anesthesia and not really moving to noxious, noted to be complaining of headache, on exam noted to have left arm and leg weakness, admitted for further stroke workup.    #R/o Stroke  - Repeat HCT for acute changes in neuro exam  - Obtain MRI Brain without contrast  - no need for echo for now   - Goal SBP < 180  - Bedside Dysphagia Screen- failed  - PT/OT/SLP   - Obtain Hemoglobin A1C, Lipid Profile Panel, and TSH    #Secondary Stroke Prevention Medication  - recommend asa and plavix however given failed dysphagia screen use asa 300mg rectal  - hold atorvastatin 80mg PO daily- cholesterol and stroke prevention   - Start lovenox SQ and SCDs for DVT prophylaxis     #HTN  -need formal med rec in AM  -for now, SBP <180    #HLD  -unsure of home medications as patient and daughter are poor historians, needs formal rec in AM  -holding lipitor for now as patient is NPO (failed dysphagia)  -follow AM lipid    Preventative:  F: on Nacl 125 cc/hour  E: replete as needed  N: npo pending S+S  DVT: Lovenox 40mg SqQ   No Pt is a 61 yo F with PMH HTN, HLD, asthma, ?RADHA, and HA who p/f outpt surgical center after septoplasty with L CP, LUE pain, SOB since waking from anesthesia after septoplasty. On arrival to [facility], pt complaining of new L sided weakness/numbness prompting stroke code with NIHSS 5, neg imaging pending MR brain. Admitted to  for further observation and management.

## 2022-03-04 ENCOUNTER — APPOINTMENT (OUTPATIENT)
Dept: NEUROSURGERY | Facility: CLINIC | Age: 62
End: 2022-03-04
Payer: MEDICAID

## 2022-03-04 VITALS
WEIGHT: 170 LBS | DIASTOLIC BLOOD PRESSURE: 80 MMHG | TEMPERATURE: 97.1 F | HEART RATE: 79 BPM | BODY MASS INDEX: 30.12 KG/M2 | HEIGHT: 63 IN | OXYGEN SATURATION: 97 % | SYSTOLIC BLOOD PRESSURE: 148 MMHG

## 2022-03-04 DIAGNOSIS — Z87.39 PERSONAL HISTORY OF OTHER DISEASES OF THE MUSCULOSKELETAL SYSTEM AND CONNECTIVE TISSUE: ICD-10-CM

## 2022-03-04 DIAGNOSIS — M79.604 LOW BACK PAIN, UNSPECIFIED: ICD-10-CM

## 2022-03-04 DIAGNOSIS — Z86.79 PERSONAL HISTORY OF OTHER DISEASES OF THE CIRCULATORY SYSTEM: ICD-10-CM

## 2022-03-04 DIAGNOSIS — M54.50 LOW BACK PAIN, UNSPECIFIED: ICD-10-CM

## 2022-03-04 DIAGNOSIS — Z78.9 OTHER SPECIFIED HEALTH STATUS: ICD-10-CM

## 2022-03-04 DIAGNOSIS — Z86.39 PERSONAL HISTORY OF OTHER ENDOCRINE, NUTRITIONAL AND METABOLIC DISEASE: ICD-10-CM

## 2022-03-04 DIAGNOSIS — Z80.0 FAMILY HISTORY OF MALIGNANT NEOPLASM OF DIGESTIVE ORGANS: ICD-10-CM

## 2022-03-04 DIAGNOSIS — Z80.9 FAMILY HISTORY OF MALIGNANT NEOPLASM, UNSPECIFIED: ICD-10-CM

## 2022-03-04 PROCEDURE — 99205 OFFICE O/P NEW HI 60 MIN: CPT

## 2022-03-04 RX ORDER — NITROGLYCERIN 0.4 MG/1
0.4 TABLET SUBLINGUAL
Refills: 0 | Status: ACTIVE | COMMUNITY

## 2022-03-04 RX ORDER — ATORVASTATIN CALCIUM 80 MG/1
80 TABLET, FILM COATED ORAL
Refills: 0 | Status: ACTIVE | COMMUNITY

## 2022-03-04 RX ORDER — TRAMADOL HYDROCHLORIDE 50 MG/1
50 TABLET, COATED ORAL
Refills: 0 | Status: ACTIVE | COMMUNITY

## 2022-03-04 RX ORDER — EZETIMIBE 10 MG/1
10 TABLET ORAL
Refills: 0 | Status: ACTIVE | COMMUNITY

## 2022-03-04 RX ORDER — GABAPENTIN 300 MG/1
300 CAPSULE ORAL
Refills: 0 | Status: ACTIVE | COMMUNITY

## 2022-03-04 RX ORDER — ICOSAPENT ETHYL 1000 MG/1
1 CAPSULE ORAL
Refills: 0 | Status: ACTIVE | COMMUNITY

## 2022-03-04 RX ORDER — ISOSORBIDE DINITRATE 30 MG/1
30 TABLET ORAL
Refills: 0 | Status: ACTIVE | COMMUNITY

## 2022-03-04 NOTE — PHYSICAL EXAM
[General Appearance - Alert] : alert [General Appearance - In No Acute Distress] : in no acute distress [Oriented To Time, Place, And Person] : oriented to person, place, and time [Impaired Insight] : insight and judgment were intact [Affect] : the affect was normal [Sensation Tactile Decrease] : light touch was intact [Abnormal Walk] : normal gait [2+] : Patella left 2+ [No Visual Abnormalities] : no visible abnormailities [Straight-Leg Raise Test - Left] : straight leg raise of the left leg was negative [Straight-Leg Raise Test - Right] : straight leg raise of the right leg was positive [Restricted] : was restricted [Pain] : was painful [Normal] : normal [Pain / Temp Decrease Lateral Leg & Dorsum Of Foot Right Only] : L5 sensory impairment [Pain / Temp Decrease Sole Of Foot & Posterior Leg Left Only] : S1 sensory impairment [No Deficits] : no sensory deficits [5] : 5/5 Ankle Plantar Flexion (S1)

## 2022-03-04 NOTE — HISTORY OF PRESENT ILLNESS
[FreeTextEntry1] : low back and neck pain  [de-identified] : The patient is a 60 y/o, female, with a hx of HTN, hyperlipidemia, CAD, chronic neck and lower back pain. Her neck pain is most bothersome. The patient was seen by pain management 2 weeks ago and received an injection to her cervical spine which worsened her neck pain and caused her stiffness. She has associated numbness and tingling of b/l arms, hands, fingers. She reports weakness of her hands. \par Her lower back pain radiates to her R leg down to her foot and big toe. She also has associated numbness, tingling, weakness of R leg. She reports her R leg gives out on her when ambulating. She is now wearing special orthotic shoes. She denies incontinence. She has been taking gabapentin 600 mg and tramadol. She has been attending PT for her spine which is mildly improving her symptoms. She has not received any injections to her spine. \par

## 2022-03-04 NOTE — ASSESSMENT
[FreeTextEntry1] : Patient is a 62 y/o female, with lumbar and cervical radiculopathy. MRI cervical and lumbar spine at [facility] discussed with pt in detail. MR lumbar with L5-S1 herniation. Will obtain Xrays with flex/extension to r/o instability. Advised pt to fax over EMG results to office. Rx for methocarbamol provided for muscle spasm of cervical spine. Pt to f/u once xrays completed. Counseled pt, all questions and concerns addressed.

## 2022-03-04 NOTE — REVIEW OF SYSTEMS
[As Noted in HPI] : as noted in HPI [Numbness] : numbness [Tingling] : tingling [Abnormal Sensation] : an abnormal sensation [Negative] : Heme/Lymph [Leg Weakness] : leg weakness [de-identified] : low back and neck pain

## 2022-03-04 NOTE — REASON FOR VISIT
[New Patient Visit] : a new patient visit [Referred By: _________] : Patient was referred by SHIRLEY

## 2022-04-12 ENCOUNTER — APPOINTMENT (OUTPATIENT)
Dept: NEUROSURGERY | Facility: CLINIC | Age: 62
End: 2022-04-12
Payer: MEDICAID

## 2022-04-12 VITALS
BODY MASS INDEX: 30.83 KG/M2 | HEIGHT: 63 IN | OXYGEN SATURATION: 96 % | SYSTOLIC BLOOD PRESSURE: 111 MMHG | WEIGHT: 174 LBS | TEMPERATURE: 97.8 F | HEART RATE: 79 BPM | DIASTOLIC BLOOD PRESSURE: 60 MMHG

## 2022-04-12 DIAGNOSIS — M54.50 LOW BACK PAIN, UNSPECIFIED: ICD-10-CM

## 2022-04-12 DIAGNOSIS — M54.2 CERVICALGIA: ICD-10-CM

## 2022-04-12 DIAGNOSIS — M51.26 OTHER INTERVERTEBRAL DISC DISPLACEMENT, LUMBAR REGION: ICD-10-CM

## 2022-04-12 DIAGNOSIS — M54.9 DORSALGIA, UNSPECIFIED: ICD-10-CM

## 2022-04-12 PROCEDURE — 99213 OFFICE O/P EST LOW 20 MIN: CPT

## 2022-04-12 RX ORDER — METHOCARBAMOL 750 MG/1
750 TABLET, FILM COATED ORAL 3 TIMES DAILY
Qty: 90 | Refills: 0 | Status: ACTIVE | COMMUNITY
Start: 2022-04-12 | End: 1900-01-01

## 2022-04-12 NOTE — HISTORY OF PRESENT ILLNESS
[FreeTextEntry1] : low back and neck pain  [de-identified] : 04/12/22: Mrs. Montoya is a 60 y/o, female, with hx of chronic neck and low back pain. She continues to have constant neck pain radiating to UE with numbness of hands. She also continues to have low back pain when walking up steps. Her low back pain radiates to R leg with weakness, and numbness. EMG 01/21 with R L4-5 radiculopathy. Pt is seen by pain management Filippo and has received multiple injections to her cervical and lumbar spine. She stated her last cervical injection was a month ago and worsened her symptoms. She has done PT, but her insurance dc it. She denies LOB, or incontinence. \par \par The patient is a 60 y/o, female, with a hx of HTN, hyperlipidemia, CAD, chronic neck and lower back pain. Her neck pain is most bothersome. The patient was seen by pain management 2 weeks ago and received an injection to her cervical spine which worsened her neck pain and caused her stiffness. She has associated numbness and tingling of b/l arms, hands, fingers. She reports weakness of her hands. \par Her lower back pain radiates to her R leg down to her foot and big toe. She also has associated numbness, tingling, weakness of R leg. She reports her R leg gives out on her when ambulating. She is now wearing special orthotic shoes. She denies incontinence. She has been taking gabapentin 600 mg and tramadol. She has been attending PT for her spine which is mildly improving her symptoms. She has not received any injections to her spine. \par

## 2022-04-12 NOTE — REVIEW OF SYSTEMS
[As Noted in HPI] : as noted in HPI [Leg Weakness] : leg weakness [Numbness] : numbness [Tingling] : tingling [Abnormal Sensation] : an abnormal sensation [Negative] : Heme/Lymph [de-identified] : low back and neck pain

## 2022-04-12 NOTE — DATA REVIEWED
[de-identified] : MRI lumbar and cervical spine [facility] [de-identified] : xray lumbar and cervical spine [facility]

## 2022-04-12 NOTE — PHYSICAL EXAM
[General Appearance - Alert] : alert [General Appearance - In No Acute Distress] : in no acute distress [Oriented To Time, Place, And Person] : oriented to person, place, and time [Impaired Insight] : insight and judgment were intact [Affect] : the affect was normal [Sensation Tactile Decrease] : light touch was intact [Abnormal Walk] : normal gait [2+] : Patella left 2+ [No Visual Abnormalities] : no visible abnormailities [Straight-Leg Raise Test - Right] : straight leg raise of the right leg was positive [Restricted] : was restricted [Pain] : was painful [Normal] : normal [Pain / Temp Decrease Lateral Leg & Dorsum Of Foot Right Only] : L5 sensory impairment [Pain / Temp Decrease Sole Of Foot & Posterior Leg Left Only] : S1 sensory impairment [No Deficits] : no sensory deficits [5] : 5/5 Ankle Plantar Flexion (S1) [4] : 4/5 Finger Flexors (C8) [Straight-Leg Raise Test - Left] : straight leg raise of the left leg was negative

## 2022-04-12 NOTE — REASON FOR VISIT
[Follow-Up: _____] : a [unfilled] follow-up visit [New Patient Visit] : a new patient visit [Referred By: _________] : Patient was referred by SHIRLEY

## 2022-04-12 NOTE — ASSESSMENT
[FreeTextEntry1] : Patient is a 60 y/o female, with lumbar spondylosis, lumbar herniations, cervical herniation C5-6 on R and cervical radiculopathy, as well as lumbar radiculopathy. Pt with decreased  strength on R. Pt has failed conservative management with PT, PO meds, and injections. Will obtain CT cervical to r/o pars deformans. Rx for PT provided. Will also obtain new EMG. Pt to f/u once studies completed.

## 2022-06-06 RX ORDER — METHOCARBAMOL 750 MG/1
750 TABLET, FILM COATED ORAL 3 TIMES DAILY
Qty: 90 | Refills: 0 | Status: DISCONTINUED | COMMUNITY
Start: 2022-03-04 | End: 2022-06-06

## 2022-06-09 ENCOUNTER — APPOINTMENT (OUTPATIENT)
Dept: NEUROSURGERY | Facility: CLINIC | Age: 62
End: 2022-06-09
Payer: MEDICAID

## 2022-06-09 VITALS
BODY MASS INDEX: 29.77 KG/M2 | HEIGHT: 63 IN | WEIGHT: 168 LBS | SYSTOLIC BLOOD PRESSURE: 176 MMHG | OXYGEN SATURATION: 98 % | DIASTOLIC BLOOD PRESSURE: 81 MMHG | HEART RATE: 68 BPM

## 2022-06-09 PROCEDURE — 99213 OFFICE O/P EST LOW 20 MIN: CPT

## 2022-06-11 NOTE — ASSESSMENT
[FreeTextEntry1] : Since she  has a lot of right hip pain. I will have her do hip xrays.\par \par I, Dr.Jamie Darnell, evaluated the patient with the nurse practitioner Cy Collazo and established the plan of care. I personally discuss this patient with the nurse practitioner at the time of the visit. I agree with the assessment and plan as written, unless noted below.\par \par

## 2022-06-11 NOTE — PHYSICAL EXAM
[General Appearance - Alert] : alert [General Appearance - In No Acute Distress] : in no acute distress [Oriented To Time, Place, And Person] : oriented to person, place, and time [Impaired Insight] : insight and judgment were intact [Affect] : the affect was normal [Sensation Tactile Decrease] : light touch was intact [Abnormal Walk] : normal gait [2+] : Patella left 2+ [No Visual Abnormalities] : no visible abnormailities [Straight-Leg Raise Test - Right] : straight leg raise of the right leg was positive [Restricted] : was restricted [Pain] : was painful [Normal] : normal [Pain / Temp Decrease Lateral Leg & Dorsum Of Foot Right Only] : L5 sensory impairment [Pain / Temp Decrease Sole Of Foot & Posterior Leg Left Only] : S1 sensory impairment [4] : 4/5 Finger Flexors (C8) [No Deficits] : no sensory deficits [5] : 5/5 Ankle Plantar Flexion (S1) [Straight-Leg Raise Test - Left] : straight leg raise of the left leg was negative [] : no respiratory distress [Heart Rate And Rhythm] : heart rate was normal and rhythm regular

## 2022-06-11 NOTE — HISTORY OF PRESENT ILLNESS
[FreeTextEntry1] : low back and neck pain  [de-identified] : 6/09/2022 She is here for evaluation. Her worse complain is the numbness and tingling on the hand and arm on the right. She also has pain in the right arm and leg. Severity 8/10. She can walk about 2 blocks. She has had falls due to the leg weakness. She has no bowel or bladder incontinence.\par \par 04/12/22: Mrs. Montoya is a 60 y/o, female, with hx of chronic neck and low back pain. She continues to have constant neck pain radiating to UE with numbness of hands. She also continues to have low back pain when walking up steps. Her low back pain radiates to R leg with weakness, and numbness. EMG 01/21 with R L4-5 radiculopathy. Pt is seen by pain management Filippo and has received multiple injections to her cervical and lumbar spine. She stated her last cervical injection was a month ago and worsened her symptoms. She has done PT, but her insurance dc it. She denies LOB, or incontinence. \par \par The patient is a 60 y/o, female, with a hx of HTN, hyperlipidemia, CAD, chronic neck and lower back pain. Her neck pain is most bothersome. The patient was seen by pain management 2 weeks ago and received an injection to her cervical spine which worsened her neck pain and caused her stiffness. She has associated numbness and tingling of b/l arms, hands, fingers. She reports weakness of her hands. \par Her lower back pain radiates to her R leg down to her foot and big toe. She also has associated numbness, tingling, weakness of R leg. She reports her R leg gives out on her when ambulating. She is now wearing special orthotic shoes. She denies incontinence. She has been taking gabapentin 600 mg and tramadol. She has been attending PT for her spine which is mildly improving her symptoms. She has not received any injections to her spine. \par

## 2022-07-05 ENCOUNTER — APPOINTMENT (OUTPATIENT)
Dept: SURGERY | Facility: CLINIC | Age: 62
End: 2022-07-05

## 2022-07-20 ENCOUNTER — APPOINTMENT (OUTPATIENT)
Age: 62
End: 2022-07-20

## 2022-07-20 PROCEDURE — 99213 OFFICE O/P EST LOW 20 MIN: CPT | Mod: 25

## 2022-07-20 PROCEDURE — 95886 MUSC TEST DONE W/N TEST COMP: CPT

## 2022-07-20 PROCEDURE — 95911 NRV CNDJ TEST 9-10 STUDIES: CPT

## 2022-07-21 NOTE — REVIEW OF SYSTEMS
[Arm Weakness] : arm weakness [Hand Weakness] :  hand weakness [Leg Weakness] : leg weakness [Numbness] : numbness [Tingling] : tingling [Difficulty Walking] : difficulty walking [As Noted in HPI] : as noted in HPI [Fever] : no fever [Chills] : no chills [Feeling Poorly] : not feeling poorly [Feeling Tired] : not feeling tired [Confused or Disoriented] : no confusion [Memory Lapses or Loss] : no memory loss [Decr. Concentrating Ability] : no decrease in concentrating ability [Difficulty with Language] : no ~M difficulty with language [Changed Thought Patterns] : no change in thought patterns [Repeating Questions] : no repeated questioning about recent events [Facial Weakness] : no facial weakness [Difficulty Writing] : no difficulty writing [Poor Coordination] : good coordination [Difficulties in Speech] : no speech difficulties [Hypersensitivity] : no hypersensitivity [Seizures] : no convulsions [Dizziness] : no dizziness [Fainting] : no fainting [Lightheadedness] : no lightheadedness [Vertigo] : no vertigo [Inability to Walk] : able to walk [Ataxia] : no ataxia [Frequent Falls] : not falling [Limping] : not limping

## 2022-07-21 NOTE — PHYSICAL EXAM
[FreeTextEntry1] : General: this is a pleasant patient in no acute distress\par \par HEENT conjunctiva are normal, oropharynx is clear, no tenderness in head\par \par CV: normal pulses, regular rate and rhythm, no peripheral edema noted\par \par Lungs: breathing is non-labored\par \par abd: soft and non-distended\par \par MSK:\par SLR: Positive B/L\par range of motion: wnl\par tinnels: R positive\par phalen b/l positive\par spurling:positive b/l\par Occipital nerve tenderness: Positive B/l\par \par Mental status:\par Alert and oriented to person, place and time\par Memory: consolidates\par Attention: grossly nl\par Language is normal \par \par Cranial Nerves:\par extra-occular movements in tact without nystagmus, normal saccades and smooth pursuit, visual fields full to confrontation, pupils equal, round and reactive to light. Face symmetric and facial strength symmetric, facial sensation symmetric, hearing present bilaterally to finger rub, tongue and uvula midline. neck flexion and extension normal strength.\par \par Motor: normal bulk and tone throughout. no abnormal movements.  Full 5/5 strength uppers and 4+/5 lower extremities (appears pain limited) proximally and distally\par \par Sensory: intact and symmetric  light tough, pin prick, temperature. vibration mildly decreased b/l feet to ankle and b/l hands to wrist. \par \par Cerebellar: normal finger-nose-finger bilaterally\par \par Reflexes: 2+ in the upper and lower extremities and symmetric.  toes are bilaterally downgoing.\par \par Gait: stable, able to tip toe heel and tandem\par \par Stances:\par Romberg: negative

## 2022-07-21 NOTE — HISTORY OF PRESENT ILLNESS
[FreeTextEntry1] : RAYNA GARCIA is a 61 year who is referred for clinical neurophysiological consultation and EMG/NCS for c/o of b/l LE>UE numbness and tingling, and weakness. \par \par Symptoms started around 10 years ago as cervical and lumbar pain which over time symptoms of N/T and weakness of LE and bellow the knees started, LLE first and then progressed to RLE as well. \par She has difficulty taking stairs, or walking fast, has difficulty standing from the chair due to back pain. \par \par Arm symptoms started long time ago, progressive, describing as "feeling heaving in fingers with pins and needle", mostly over the hands but symptoms can go up to b/l forehands as well. She experiences difficulty opening a jar or holding objects. She denies using any wristband or braces at night, but wakes her up at night, pain dose not improve with shaking, states does not alleviates with any pain medications. Denies difficulty combing hair or reaching over head objects.\par  \par She has referral for PT for knees which hasn’t started yet. Not done any PT for the symptoms above yet. \par She works as care provider. \par \par Denies diplopia, blurred vision, dysphagia, dysarthria, aphasia, bowel or bladder dysfunction, imbalance, falls. \par \par \par

## 2022-07-21 NOTE — ASSESSMENT
[FreeTextEntry1] : left S1 radiculopathy found on EMG today\par \par EMG REPORT WILL BE UPLOADED SEPARATELY AS A PDF\par \par I am unable to do 4 limb studies in 1 visit. please re-refer for upper extremity EMG if that is needed\par

## 2022-09-13 ENCOUNTER — APPOINTMENT (OUTPATIENT)
Dept: NEUROSURGERY | Facility: CLINIC | Age: 62
End: 2022-09-13

## 2022-09-13 VITALS
WEIGHT: 168 LBS | DIASTOLIC BLOOD PRESSURE: 66 MMHG | BODY MASS INDEX: 29.77 KG/M2 | OXYGEN SATURATION: 97 % | HEART RATE: 68 BPM | SYSTOLIC BLOOD PRESSURE: 116 MMHG | TEMPERATURE: 96.7 F | HEIGHT: 63 IN

## 2022-09-13 DIAGNOSIS — M54.2 CERVICALGIA: ICD-10-CM

## 2022-09-13 DIAGNOSIS — M25.551 PAIN IN RIGHT HIP: ICD-10-CM

## 2022-09-13 DIAGNOSIS — M54.12 RADICULOPATHY, CERVICAL REGION: ICD-10-CM

## 2022-09-13 DIAGNOSIS — M54.16 RADICULOPATHY, LUMBAR REGION: ICD-10-CM

## 2022-09-13 PROCEDURE — 99213 OFFICE O/P EST LOW 20 MIN: CPT

## 2022-09-14 NOTE — PHYSICAL EXAM
[General Appearance - Alert] : alert [General Appearance - In No Acute Distress] : in no acute distress [Oriented To Time, Place, And Person] : oriented to person, place, and time [Impaired Insight] : insight and judgment were intact [Affect] : the affect was normal [Sensation Tactile Decrease] : light touch was intact [Abnormal Walk] : normal gait [2+] : Patella left 2+ [No Visual Abnormalities] : no visible abnormailities [Straight-Leg Raise Test - Right] : straight leg raise of the right leg was positive [Restricted] : was restricted [Pain] : was painful [Normal] : normal [Pain / Temp Decrease Lateral Leg & Dorsum Of Foot Right Only] : L5 sensory impairment [Pain / Temp Decrease Sole Of Foot & Posterior Leg Left Only] : S1 sensory impairment [4] : 4/5 Finger Flexors (C8) [No Deficits] : no sensory deficits [5] : 5/5 Ankle Plantar Flexion (S1) [Straight-Leg Raise Test - Left] : straight leg raise of the left leg was negative

## 2022-09-14 NOTE — HISTORY OF PRESENT ILLNESS
[FreeTextEntry1] : low back and neck pain  [de-identified] : 09/13/22: Pt is a 63 y/o, female, here for f/u to review xray of hip due to R groin/leg pain. She continues to have unresolved neck pain radiating to R arm/hand and R leg pain. She has been attending PT which has not relieved her symptoms. She rates her pain a 9/10. She has numbness, stiffness, tingling of R hand/fingers. She has been taking ibuprofen, but dc due it causing her issues with her stomach. Pt has received injections in the past by Dr.Moisey forrest 1 year ago.\par \par 6/09/2022 She is here for evaluation. Her worse complain is the numbness and tingling on the hand and arm on the right. She also has pain in the right arm and leg. Severity 8/10. She can walk about 2 blocks. She has had falls due to the leg weakness. She has no bowel or bladder incontinence.\par \par 04/12/22: Mrs. Montoya is a 62 y/o, female, with hx of chronic neck and low back pain. She continues to have constant neck pain radiating to UE with numbness of hands. She also continues to have low back pain when walking up steps. Her low back pain radiates to R leg with weakness, and numbness. EMG 01/21 with R L4-5 radiculopathy. Pt is seen by pain management Filippo and has received multiple injections to her cervical and lumbar spine. She stated her last cervical injection was a month ago and worsened her symptoms. She has done PT, but her insurance dc it. She denies LOB, or incontinence. \par \par The patient is a 62 y/o, female, with a hx of HTN, hyperlipidemia, CAD, chronic neck and lower back pain. Her neck pain is most bothersome. The patient was seen by pain management 2 weeks ago and received an injection to her cervical spine which worsened her neck pain and caused her stiffness. She has associated numbness and tingling of b/l arms, hands, fingers. She reports weakness of her hands. \par Her lower back pain radiates to her R leg down to her foot and big toe. She also has associated numbness, tingling, weakness of R leg. She reports her R leg gives out on her when ambulating. She is now wearing special orthotic shoes. She denies incontinence. She has been taking gabapentin 600 mg and tramadol. She has been attending PT for her spine which is mildly improving her symptoms. She has not received any injections to her spine. \par

## 2022-09-14 NOTE — ASSESSMENT
[FreeTextEntry1] : Imaging reviewed with pt in detail. MRI with mild findings and no significant herniations and stenosis to explain degree of pain. Surgical intervention not recommended at this time. Continue conservative management with PT. New referral for pain management provided for a SI joint injection. EMG of UE to r/o radiculopathy

## 2022-09-14 NOTE — REVIEW OF SYSTEMS
[As Noted in HPI] : as noted in HPI [Numbness] : numbness [Tingling] : tingling [Abnormal Sensation] : an abnormal sensation [Negative] : Heme/Lymph [de-identified] : Neck pain, lbp

## 2022-09-14 NOTE — RESULTS/DATA
[FreeTextEntry1] : IMPRESSION: \par 1. Hip joint spaces are well preserved.\par \par 2. Enthesopathy right greater trochanter

## 2022-12-08 ENCOUNTER — APPOINTMENT (OUTPATIENT)
Dept: NEUROSURGERY | Facility: CLINIC | Age: 62
End: 2022-12-08

## 2023-10-24 NOTE — DIETITIAN INITIAL EVALUATION ADULT. - PROBLEM SELECTOR PLAN 10
- F: s/p 1.5L NS bolus in ER  - E: replete K<4, Mg<2  - N: NPO pending S+S eval (failed dysphagia)  - D: lovenox 40mg daily    code: full  dispo: 7L Consent (Scalp)/Introductory Paragraph: The rationale for Mohs was explained to the patient and consent was obtained. The risks, benefits and alternatives to therapy were discussed in detail. Specifically, the risks of alopecia in or around the treatment site, changes in hair growth pattern secondary to repair, infection, scarring, bleeding, prolonged wound healing, incomplete removal, allergy to anesthesia, nerve injury and recurrence were addressed. Prior to the procedure, the treatment site was clearly identified and confirmed by the patient using a hand mirror. All components of Universal Protocol/PAUSE Rule completed. yes

## 2024-02-15 ENCOUNTER — APPOINTMENT (OUTPATIENT)
Dept: SPINE | Facility: CLINIC | Age: 64
End: 2024-02-15
Payer: MEDICAID

## 2024-02-15 VITALS
SYSTOLIC BLOOD PRESSURE: 143 MMHG | BODY MASS INDEX: 29.23 KG/M2 | OXYGEN SATURATION: 98 % | RESPIRATION RATE: 18 BRPM | DIASTOLIC BLOOD PRESSURE: 76 MMHG | HEART RATE: 68 BPM | HEIGHT: 63 IN | WEIGHT: 165 LBS

## 2024-02-15 PROCEDURE — 99203 OFFICE O/P NEW LOW 30 MIN: CPT

## 2024-02-16 ENCOUNTER — TRANSCRIPTION ENCOUNTER (OUTPATIENT)
Age: 64
End: 2024-02-16

## 2024-02-22 NOTE — HISTORY OF PRESENT ILLNESS
[de-identified] : 63yF sent by her PT for years of cervical pain described as sharp jolts r/t shoulder and arm, and prior imaging with multi-level disc displacement. Pt now reports lumbar issues.   Today 2/15/24 pt continues to have neck and shoulder pain

## 2024-02-22 NOTE — RESULTS/DATA
[FreeTextEntry1] : Date of Exam: 12- EXAM:  X-RAY CERVICAL SPINE 6 OR MORE VIEW HISTORY:  Chronic pain. Trauma.. TECHNIQUE:  AP, lateral, and oblique  radiographic views of the cervical spine were obtained. Lateral views were obtained of the neutral, flexion, and extension positions. COMPARISON:  X-ray 3/9/2022.  FINDINGS:   The vertebral body height is well maintained at all levels. At the C5-6 level there is mild disc space narrowing and spondylosis. There is no abnormal motion upon flexion and extension. The neural foramen are patent bilaterally. The dens is intact. The prevertebral soft tissues are normal. . IMPRESSION: Mild degenerative disc disease and spondylosis at the C5-6 level. Similar findings were seen on the prior exam.  Date of Exam: 05- EXAM:  CT LUMBAR SPINE WITHOUT CONTRAST Note - This patient has received 0 CT studies and 0 Myocardial Perfusion studies within our network over the previous 12 month period. HISTORY:  Lower back pain for months. TECHNIQUE:  CT of the lumbar spine was performed without contrast with axial multislice multidetector technique. Sagittal, coronal and axial reformatted images were then obtained. One or more of the following dose reduction techniques were used: automated exposure control, adjustment of the mA and/or kV according to patient size, use of iterative reconstruction technique.  COMPARISON:  Lumbar spine MRI dated 2/28/2022. FINDINGS:  OSSEOUS STRUCTURES: For numbering purposes the last intervertebral disc space is designated L5-S1. The normal lumbar lordosis is preserved. There is minimal leftward curvature of the lumbar spine. Lumbar vertebral body heights are maintained. Lumbar vertebral bodies are aligned. There are small multilevel endplate osteophytes. No destructive bony lesion. INTERVERTEBRAL DISCS: There is moderate-severe disc height loss at L5-S1 and mild-moderate at L1-L2 through L4-L5. There is degenerative vacuum disc phenomena at L5-S1. SPINAL CANAL/NEURAL FORAMEN: There is no significant spinal canal stenosis, although the spinal canal is suboptimally evaluated inherent to CT technique.  T12-L1: Minimal disc bulge resulting in slight impression upon the ventral thecal sac without significant spinal canal or neural foraminal stenosis. L1-L2:  Minimal-small circumferential disc bulge resulting in minimal impression upon the ventral thecal sac without significant spinal canal or neural foraminal stenosis. L2-L3: Small circumferential disc bulge resulting in minimal impression upon the ventral thecal sac without significant spinal canal or neural foraminal stenosis. L3-L4: Small circumferential disc bulge resulting in minimal impression upon the ventral thecal sac and minimal bilateral neural foraminal stenosis. No significant spinal canal stenosis. L4-L5: Small circumferential disc bulge and facet joint hypertrophy resulting in minimal impression upon the ventral thecal sac and minimal bilateral neural foraminal stenosis. No stenotic and spinal canal stenosis. L5-S1: Minimal circumferential disc bulge, small bilateral foraminal osteophyte/disc complexes and facet joint hypertrophy resulting in moderate bilateral neural foraminal stenosis with contacting the bilateral exiting L5 nerve roots. SOFT TISSUES: The paraspinal musculature is preserved. MISCELLANEOUS: At the anterior aspect of the fundus of the uterus there is a faint hyperdensity measuring 1.5 x 1.4 cm, suggestive of fibroid. There is mild scattered gas fibroid plaque of the abdominal aorta which is normal in caliber.  IMPRESSION:   Similar findings since L-spine MRI of 2/28/22, given differences in modality: Preserved lumbar vertebral body heights and alignment without acute bony pathology. No CT evidence for significant spinal canal stenosis. Minimal leftward curvature of the lumbar spine. L5-S1: Moderate bilateral neural foraminal stenosis with contacting the bilateral exiting L5 nerve root secondary to minimal circumferential disc bulge, small bilateral foraminal osteophyte/disc complexes and facet joint hypertrophy. L3-L4 and L4-5: Minimal bilateral neural foraminal stenosis secondary to disc bulges and facet joint hypertrophy. Mild multilevel endplate spondylosis. Moderate-severe degenerative disc disease at L5-S1 with vacuum disc phenomena. Mild-moderate degenerative disc disease at L1-L2 through L4-5. Multilevel disc bulges. Evidence for anterior uterine 1.5 cm fibroid. Pelvic ultrasound can be performed for further evaluation.  Date of Exam: 03- EXAM:  X-RAY LUMBAR SPINE MINIMUM 4 VIEWS HISTORY:  Low back pain. TECHNIQUE:  4 radiographic views of the lumbar spine were obtained. COMPARISON:  None.  FINDINGS:   Vertebrae: There is normal alignment of the spine without evidence for fracture. There is no evidence of spondylosis, or spondylolisthesis. o significant lumbar scoliosis. Number of lumbar vertebrae: 5. Disc spaces: Disc space narrowing and hypertrophic spurring noted at T12-L1, L1/2, L4-5 and L5-S1 consistent with degenerative disc disease. Sacroiliac joints: Normal. Soft tissues: Normal. IMPRESSION: Mild spondylosis deformans with diffuse degenerative disc disease throughout the lumbar spine. No evidence of fracture or destructive lesion.  Date of Exam: 02- EXAM:  MRI LUMBAR SPINE WITHOUT CONTRAST HISTORY:  Lower back pain. TECHNIQUE: An MRI examination of the lumbar spine was performed utilizing multiplanar sequences. COMPARISON:  Comparison is made to the MRI of the lumbar spine performed on 1/7/2021. FINDINGS:  There is mild retrolisthesis of L1 on L2 and L2 on L3. The vertebral bodies maintain normal height and demonstrate normal marrow signal. There is intervertebral disc space narrowing with disc desiccation and mild degenerative endplate osteophytes at multiple lumbar levels.  The conus medullaris terminates at the level of L2 and demonstrates normal signal characteristics. At L5/S1, there is a mild disc bulge which is in the vicinity of the exiting L5 nerve roots and in combination with facet/flavum hypertrophy there is moderate right as well as mild left neural foraminal narrowing however there is no central canal stenosis. At L4/L5, there is a mild disc bulge which in combination with facet/flavum hypertrophy results in mild central canal stenosis as well as mild neural foraminal narrowing bilaterally. At L3/L4, there is a mild disc bulge without significant central canal stenosis or neural foraminal narrowing. At L2/L3, there is a mild disc bulge resulting in mild central canal stenosis however there is no neural foraminal narrowing. At L1/L2, there is a mild disc bulge resulting in mild central canal stenosis however there is no neural foraminal narrowing. On the sagittal T2-weighted images, there is no disc herniation, central canal stenosis or neural foraminal narrowing at T12/L1. IMPRESSION: Multilevel mild lumbar spondylosis, without significant change when compared to the previous examination.

## 2024-05-08 ENCOUNTER — APPOINTMENT (OUTPATIENT)
Dept: NEUROLOGY | Facility: CLINIC | Age: 64
End: 2024-05-08

## 2024-05-21 ENCOUNTER — APPOINTMENT (OUTPATIENT)
Dept: SURGERY | Facility: CLINIC | Age: 64
End: 2024-05-21
Payer: MEDICAID

## 2024-05-21 PROCEDURE — 99203 OFFICE O/P NEW LOW 30 MIN: CPT

## 2024-06-03 ENCOUNTER — OUTPATIENT (OUTPATIENT)
Dept: OUTPATIENT SERVICES | Facility: HOSPITAL | Age: 64
LOS: 1 days | End: 2024-06-03
Payer: MEDICAID

## 2024-06-03 VITALS
SYSTOLIC BLOOD PRESSURE: 155 MMHG | WEIGHT: 164.91 LBS | TEMPERATURE: 98 F | RESPIRATION RATE: 17 BRPM | DIASTOLIC BLOOD PRESSURE: 79 MMHG | OXYGEN SATURATION: 97 % | HEIGHT: 63 IN | HEART RATE: 69 BPM

## 2024-06-03 DIAGNOSIS — G47.33 OBSTRUCTIVE SLEEP APNEA (ADULT) (PEDIATRIC): ICD-10-CM

## 2024-06-03 DIAGNOSIS — K42.9 UMBILICAL HERNIA WITHOUT OBSTRUCTION OR GANGRENE: ICD-10-CM

## 2024-06-03 DIAGNOSIS — J45.909 UNSPECIFIED ASTHMA, UNCOMPLICATED: ICD-10-CM

## 2024-06-03 DIAGNOSIS — Z98.890 OTHER SPECIFIED POSTPROCEDURAL STATES: Chronic | ICD-10-CM

## 2024-06-03 DIAGNOSIS — Z01.818 ENCOUNTER FOR OTHER PREPROCEDURAL EXAMINATION: ICD-10-CM

## 2024-06-03 RX ORDER — ATORVASTATIN CALCIUM 80 MG/1
0 TABLET, FILM COATED ORAL
Refills: 0 | DISCHARGE

## 2024-06-03 RX ORDER — SODIUM CHLORIDE 0.9 % (FLUSH) 0.9 %
3 SYRINGE (ML) INJECTION EVERY 8 HOURS
Refills: 0 | Status: DISCONTINUED | OUTPATIENT
Start: 2024-06-12 | End: 2024-06-12

## 2024-06-03 RX ORDER — VALSARTAN 80 MG/1
0 TABLET ORAL
Qty: 0 | Refills: 0 | DISCHARGE

## 2024-06-03 RX ORDER — AMLODIPINE BESYLATE 2.5 MG/1
0 TABLET ORAL
Qty: 0 | Refills: 0 | DISCHARGE

## 2024-06-03 RX ORDER — MONTELUKAST 4 MG/1
0 TABLET, CHEWABLE ORAL
Qty: 0 | Refills: 0 | DISCHARGE

## 2024-06-03 RX ORDER — GABAPENTIN 400 MG/1
0 CAPSULE ORAL
Qty: 0 | Refills: 0 | DISCHARGE

## 2024-06-03 NOTE — H&P PST ADULT - NSICDXFAMILYHX_GEN_ALL_CORE_FT
FAMILY HISTORY:  Father  Still living? Unknown  Family hx of prostate cancer, Age at diagnosis: Age Unknown    Mother  Still living? Unknown  Family history of cirrhosis of liver, Age at diagnosis: Age Unknown

## 2024-06-03 NOTE — H&P PST ADULT - FUNCTIONAL STATUS
Able to walk up 1-2flights of stairs, > 5blocks slowly w/o exertional dyspnea/4-10 METS Able to walk up 1-2flights of stairs, >5blocks slowly w/o exertional dyspnea/4-10 METS

## 2024-06-03 NOTE — H&P PST ADULT - PROBLEM SELECTOR PLAN 3
Patient with hx of RADHA - Uses mouthpiece, compliant   Perioperative RADHA precautions to be maintained.  Discussed 3hr PACU monitoring and plan of care with patient, agrees  Advised to bring oral appliances on day of surgery to facilitate reinstitution of therapy

## 2024-06-03 NOTE — H&P PST ADULT - NSICDXPASTMEDICALHX_GEN_ALL_CORE_FT
PAST MEDICAL HISTORY:  Anemia     Asthma     GERD (gastroesophageal reflux disease)     HLD (hyperlipidemia)     HTN (hypertension)     Lumbar disc herniation     Seasonal allergies     Vitamin D deficiency      PAST MEDICAL HISTORY:  Anemia     Asthma     GERD (gastroesophageal reflux disease)     History of varicose veins of lower extremity     HLD (hyperlipidemia)     HTN (hypertension)     Lumbar disc herniation     Seasonal allergies     Vitamin D deficiency

## 2024-06-03 NOTE — H&P PST ADULT - HISTORY OF PRESENT ILLNESS
63year old female with pmhx of lumbar disc herniation, hypertension, dyslipidemia, asthma, seasonal allergies, insomnia, RADHA (uses mouthpiece) - compliant, GERD, anemia, Vitamin D deficiency, low back pain and spasms presents with c/o paraumbilical pain x 1year that persisted and failed to resolve when she presented to PCP for evaluation, was referred for abdominal CT scan confirming umbilical hernia. Patient is here today for presurgical testing for scheduled umbilical hernia repair on 6/12/2024 63year old female with pmhx of lumbar disc herniation, hypertension, dyslipidemia, asthma, seasonal allergies, insomnia, RADHA (uses mouthpiece) - compliant, BLE varicose veins, GERD, anemia, Vitamin D deficiency, low back pain and spasms presents with c/o paraumbilical pain x 1year that persisted and failed to resolve when she presented to PCP for evaluation, was referred for abdominal CT scan confirming umbilical hernia. Patient is here today for presurgical testing for scheduled umbilical hernia repair on 6/12/2024

## 2024-06-03 NOTE — H&P PST ADULT - PROBLEM SELECTOR PLAN 2
Current treatment regimen - Albuterol and Symbicort inhalers PRN  Advised to continue current regimen   Patient advised with understanding verbalized to use inhalers the morning of surgery  Follow up with PCP/Pulmonologist postoperatively

## 2024-06-03 NOTE — H&P PST ADULT - PROBLEM SELECTOR PLAN 1
Patient is scheduled for umbilical hernia repair on 6/12/2024  Written and oral preoperative instructions given to patient with understanding verbalized.    Instructions given to include using 4% chlorhexidine wash as directed starting 3days before day of surgery (inclusive of day of surgery)   Maintaining NPO status post midnight day before surgery   Stopping aspirin, NSAIDs, herbs, vitamins 7days before surgery    Patient is to expect a phone call day before surgery between 430-630pm, giving arrival time for surgery day.

## 2024-06-04 PROCEDURE — G0463: CPT

## 2024-06-11 ENCOUNTER — TRANSCRIPTION ENCOUNTER (OUTPATIENT)
Age: 64
End: 2024-06-11

## 2024-06-12 ENCOUNTER — INPATIENT (INPATIENT)
Facility: HOSPITAL | Age: 64
LOS: 6 days | Discharge: HOME | DRG: 395 | End: 2024-06-19
Attending: INTERNAL MEDICINE | Admitting: INTERNAL MEDICINE
Payer: MEDICAID

## 2024-06-12 ENCOUNTER — APPOINTMENT (OUTPATIENT)
Dept: SURGERY | Facility: HOSPITAL | Age: 64
End: 2024-06-12

## 2024-06-12 ENCOUNTER — TRANSCRIPTION ENCOUNTER (OUTPATIENT)
Age: 64
End: 2024-06-12

## 2024-06-12 VITALS
TEMPERATURE: 98 F | RESPIRATION RATE: 18 BRPM | DIASTOLIC BLOOD PRESSURE: 50 MMHG | SYSTOLIC BLOOD PRESSURE: 106 MMHG | WEIGHT: 164.91 LBS | OXYGEN SATURATION: 97 % | HEIGHT: 63 IN | HEART RATE: 71 BPM

## 2024-06-12 DIAGNOSIS — Z98.890 OTHER SPECIFIED POSTPROCEDURAL STATES: Chronic | ICD-10-CM

## 2024-06-12 DIAGNOSIS — K42.9 UMBILICAL HERNIA WITHOUT OBSTRUCTION OR GANGRENE: ICD-10-CM

## 2024-06-12 LAB
ALBUMIN SERPL ELPH-MCNC: 3.5 G/DL — SIGNIFICANT CHANGE UP (ref 3.5–5)
ANION GAP SERPL CALC-SCNC: 3 MMOL/L — LOW (ref 5–17)
BUN SERPL-MCNC: 9 MG/DL — SIGNIFICANT CHANGE UP (ref 7–18)
CALCIUM SERPL-MCNC: 9 MG/DL — SIGNIFICANT CHANGE UP (ref 8.4–10.5)
CHLORIDE SERPL-SCNC: 109 MMOL/L — HIGH (ref 96–108)
CO2 SERPL-SCNC: 29 MMOL/L — SIGNIFICANT CHANGE UP (ref 22–31)
GLUCOSE BLDC GLUCOMTR-MCNC: 148 MG/DL — HIGH (ref 70–99)
GLUCOSE BLDC GLUCOMTR-MCNC: 99 MG/DL — SIGNIFICANT CHANGE UP (ref 70–99)
GLUCOSE SERPL-MCNC: 155 MG/DL — HIGH (ref 70–99)
HCT VFR BLD CALC: 40.6 % — SIGNIFICANT CHANGE UP (ref 34.5–45)
HGB BLD-MCNC: 13.2 G/DL — SIGNIFICANT CHANGE UP (ref 11.5–15.5)
MAGNESIUM SERPL-MCNC: 2.1 MG/DL — SIGNIFICANT CHANGE UP (ref 1.6–2.6)
MCHC RBC-ENTMCNC: 31.8 PG — SIGNIFICANT CHANGE UP (ref 27–34)
MCHC RBC-ENTMCNC: 32.5 GM/DL — SIGNIFICANT CHANGE UP (ref 32–36)
MCV RBC AUTO: 97.8 FL — SIGNIFICANT CHANGE UP (ref 80–100)
NRBC # BLD: 0 /100 WBCS — SIGNIFICANT CHANGE UP (ref 0–0)
PLATELET # BLD AUTO: 206 K/UL — SIGNIFICANT CHANGE UP (ref 150–400)
POTASSIUM SERPL-MCNC: 4.1 MMOL/L — SIGNIFICANT CHANGE UP (ref 3.5–5.3)
POTASSIUM SERPL-SCNC: 4.1 MMOL/L — SIGNIFICANT CHANGE UP (ref 3.5–5.3)
RBC # BLD: 4.15 M/UL — SIGNIFICANT CHANGE UP (ref 3.8–5.2)
RBC # FLD: 11.7 % — SIGNIFICANT CHANGE UP (ref 10.3–14.5)
SODIUM SERPL-SCNC: 141 MMOL/L — SIGNIFICANT CHANGE UP (ref 135–145)
WBC # BLD: 8.05 K/UL — SIGNIFICANT CHANGE UP (ref 3.8–10.5)
WBC # FLD AUTO: 8.05 K/UL — SIGNIFICANT CHANGE UP (ref 3.8–10.5)

## 2024-06-12 PROCEDURE — 70496 CT ANGIOGRAPHY HEAD: CPT | Mod: 26

## 2024-06-12 PROCEDURE — 0042T: CPT

## 2024-06-12 PROCEDURE — 70450 CT HEAD/BRAIN W/O DYE: CPT | Mod: 26,59

## 2024-06-12 PROCEDURE — 70498 CT ANGIOGRAPHY NECK: CPT | Mod: 26

## 2024-06-12 DEVICE — MESH HERNIA SEPRAMESH 6 X 8": Type: IMPLANTABLE DEVICE | Status: FUNCTIONAL

## 2024-06-12 DEVICE — MESH HERNIA SEPRAMESH 3 X 6": Type: IMPLANTABLE DEVICE | Status: FUNCTIONAL

## 2024-06-12 DEVICE — MESH HERNIA SEPRAMESH 4 X 8": Type: IMPLANTABLE DEVICE | Status: FUNCTIONAL

## 2024-06-12 RX ORDER — ONDANSETRON HYDROCHLORIDE 2 MG/ML
4 INJECTION INTRAMUSCULAR; INTRAVENOUS ONCE
Refills: 0 | Status: DISCONTINUED | OUTPATIENT
Start: 2024-06-12 | End: 2024-06-13

## 2024-06-12 RX ORDER — EZETIMIBE 10 MG/1
1 TABLET ORAL
Refills: 0 | DISCHARGE

## 2024-06-12 RX ORDER — ERGOCALCIFEROL 1.25 MG/1
1 CAPSULE ORAL
Refills: 0 | DISCHARGE

## 2024-06-12 RX ORDER — OXYCODONE HYDROCHLORIDE 100 MG/5ML
5 SOLUTION ORAL EVERY 4 HOURS
Refills: 0 | Status: DISCONTINUED | OUTPATIENT
Start: 2024-06-12 | End: 2024-06-17

## 2024-06-12 RX ORDER — OMEGA-3 ACID ETHYL ESTERS 1 G
1 CAPSULE ORAL
Refills: 0 | DISCHARGE

## 2024-06-12 RX ORDER — ACETAMINOPHEN 325 MG
1000 TABLET ORAL ONCE
Refills: 0 | Status: COMPLETED | OUTPATIENT
Start: 2024-06-12 | End: 2024-06-12

## 2024-06-12 RX ORDER — FAMOTIDINE 10 MG/ML
1 INJECTION INTRAVENOUS
Refills: 0 | DISCHARGE

## 2024-06-12 RX ORDER — FERROUS SULFATE 325(65) MG
1 TABLET ORAL
Refills: 0 | DISCHARGE

## 2024-06-12 RX ORDER — PREDNISOLONE 5 MG
1 TABLET ORAL
Refills: 0 | DISCHARGE

## 2024-06-12 RX ORDER — KETOROLAC TROMETHAMINE 30 MG/ML
15 INJECTION, SOLUTION INTRAMUSCULAR ONCE
Refills: 0 | Status: DISCONTINUED | OUTPATIENT
Start: 2024-06-12 | End: 2024-06-12

## 2024-06-12 RX ORDER — HYDROMORPHONE HCL 0.2 MG/ML
1 INJECTION, SOLUTION INTRAVENOUS
Refills: 0 | Status: DISCONTINUED | OUTPATIENT
Start: 2024-06-12 | End: 2024-06-13

## 2024-06-12 RX ORDER — HYDROMORPHONE HCL 0.2 MG/ML
0.5 INJECTION, SOLUTION INTRAVENOUS
Refills: 0 | Status: DISCONTINUED | OUTPATIENT
Start: 2024-06-12 | End: 2024-06-13

## 2024-06-12 RX ORDER — MONTELUKAST 4 MG/1
1 TABLET, CHEWABLE ORAL
Refills: 0 | DISCHARGE

## 2024-06-12 RX ORDER — VALSARTAN 320 MG/1
1 TABLET ORAL
Refills: 0 | DISCHARGE

## 2024-06-12 RX ORDER — BUDESONIDE/FORMOTEROL FUMARATE 160-4.5MCG
2 HFA AEROSOL WITH ADAPTER (GRAM) INHALATION
Refills: 0 | DISCHARGE

## 2024-06-12 RX ORDER — ATORVASTATIN CALCIUM 80 MG/1
1 TABLET, FILM COATED ORAL
Refills: 0 | DISCHARGE

## 2024-06-12 RX ORDER — DEXTROSE MONOHYDRATE AND SODIUM CHLORIDE 5; .3 G/100ML; G/100ML
1000 INJECTION, SOLUTION INTRAVENOUS
Refills: 0 | Status: DISCONTINUED | OUTPATIENT
Start: 2024-06-12 | End: 2024-06-13

## 2024-06-12 RX ADMIN — Medication 400 MILLIGRAM(S): at 21:21

## 2024-06-12 RX ADMIN — KETOROLAC TROMETHAMINE 15 MILLIGRAM(S): 30 INJECTION, SOLUTION INTRAMUSCULAR at 21:21

## 2024-06-12 RX ADMIN — OXYCODONE HYDROCHLORIDE 5 MILLIGRAM(S): 100 SOLUTION ORAL at 19:50

## 2024-06-12 RX ADMIN — HYDROMORPHONE HCL 0.5 MILLIGRAM(S): 0.2 INJECTION, SOLUTION INTRAVENOUS at 18:50

## 2024-06-12 RX ADMIN — OXYCODONE HYDROCHLORIDE 5 MILLIGRAM(S): 100 SOLUTION ORAL at 19:17

## 2024-06-13 ENCOUNTER — RESULT REVIEW (OUTPATIENT)
Age: 64
End: 2024-06-13

## 2024-06-13 DIAGNOSIS — Z75.8 OTHER PROBLEMS RELATED TO MEDICAL FACILITIES AND OTHER HEALTH CARE: ICD-10-CM

## 2024-06-13 DIAGNOSIS — Z29.9 ENCOUNTER FOR PROPHYLACTIC MEASURES, UNSPECIFIED: ICD-10-CM

## 2024-06-13 DIAGNOSIS — J45.909 UNSPECIFIED ASTHMA, UNCOMPLICATED: ICD-10-CM

## 2024-06-13 DIAGNOSIS — G47.33 OBSTRUCTIVE SLEEP APNEA (ADULT) (PEDIATRIC): ICD-10-CM

## 2024-06-13 DIAGNOSIS — I26.99 OTHER PULMONARY EMBOLISM WITHOUT ACUTE COR PULMONALE: ICD-10-CM

## 2024-06-13 DIAGNOSIS — I24.9 ACUTE ISCHEMIC HEART DISEASE, UNSPECIFIED: ICD-10-CM

## 2024-06-13 DIAGNOSIS — Z98.890 OTHER SPECIFIED POSTPROCEDURAL STATES: ICD-10-CM

## 2024-06-13 DIAGNOSIS — I63.9 CEREBRAL INFARCTION, UNSPECIFIED: ICD-10-CM

## 2024-06-13 DIAGNOSIS — E78.5 HYPERLIPIDEMIA, UNSPECIFIED: ICD-10-CM

## 2024-06-13 DIAGNOSIS — I10 ESSENTIAL (PRIMARY) HYPERTENSION: ICD-10-CM

## 2024-06-13 LAB
A1C WITH ESTIMATED AVERAGE GLUCOSE RESULT: 5.8 % — HIGH (ref 4–5.6)
ALP SERPL-CCNC: 106 U/L — SIGNIFICANT CHANGE UP (ref 40–120)
ALT FLD-CCNC: 28 U/L DA — SIGNIFICANT CHANGE UP (ref 10–60)
ANION GAP SERPL CALC-SCNC: 4 MMOL/L — LOW (ref 5–17)
AST SERPL-CCNC: 16 U/L — SIGNIFICANT CHANGE UP (ref 10–40)
BILIRUB SERPL-MCNC: 0.2 MG/DL — SIGNIFICANT CHANGE UP (ref 0.2–1.2)
BUN SERPL-MCNC: 8 MG/DL — SIGNIFICANT CHANGE UP (ref 7–18)
CALCIUM SERPL-MCNC: 9.4 MG/DL — SIGNIFICANT CHANGE UP (ref 8.4–10.5)
CHLORIDE SERPL-SCNC: 108 MMOL/L — SIGNIFICANT CHANGE UP (ref 96–108)
CHOLEST SERPL-MCNC: 141 MG/DL — SIGNIFICANT CHANGE UP
CK MB BLD-MCNC: 1 % — SIGNIFICANT CHANGE UP (ref 0–3.5)
CK MB BLD-MCNC: 1 % — SIGNIFICANT CHANGE UP (ref 0–3.5)
CK MB CFR SERPL CALC: 1.2 NG/ML — SIGNIFICANT CHANGE UP (ref 0–3.6)
CK MB CFR SERPL CALC: 1.5 NG/ML — SIGNIFICANT CHANGE UP (ref 0–3.6)
CK SERPL-CCNC: 125 U/L — SIGNIFICANT CHANGE UP (ref 21–215)
CK SERPL-CCNC: 153 U/L — SIGNIFICANT CHANGE UP (ref 21–215)
CO2 SERPL-SCNC: 29 MMOL/L — SIGNIFICANT CHANGE UP (ref 22–31)
CREAT SERPL-MCNC: 0.65 MG/DL — SIGNIFICANT CHANGE UP (ref 0.5–1.3)
CREAT SERPL-MCNC: 0.74 MG/DL — SIGNIFICANT CHANGE UP (ref 0.5–1.3)
EGFR: 90 ML/MIN/1.73M2 — SIGNIFICANT CHANGE UP
EGFR: 98 ML/MIN/1.73M2 — SIGNIFICANT CHANGE UP
ESTIMATED AVERAGE GLUCOSE: 120 MG/DL — HIGH (ref 68–114)
GLUCOSE SERPL-MCNC: 129 MG/DL — HIGH (ref 70–99)
HCT VFR BLD CALC: 40.4 % — SIGNIFICANT CHANGE UP (ref 34.5–45)
HDLC SERPL-MCNC: 60 MG/DL — SIGNIFICANT CHANGE UP
HGB BLD-MCNC: 13.1 G/DL — SIGNIFICANT CHANGE UP (ref 11.5–15.5)
LACTATE SERPL-SCNC: 1.1 MMOL/L — SIGNIFICANT CHANGE UP (ref 0.7–2)
LIPID PNL WITH DIRECT LDL SERPL: 70 MG/DL — SIGNIFICANT CHANGE UP
MAGNESIUM SERPL-MCNC: 2.3 MG/DL — SIGNIFICANT CHANGE UP (ref 1.6–2.6)
MCHC RBC-ENTMCNC: 31.7 PG — SIGNIFICANT CHANGE UP (ref 27–34)
MCHC RBC-ENTMCNC: 32.4 GM/DL — SIGNIFICANT CHANGE UP (ref 32–36)
MCV RBC AUTO: 97.8 FL — SIGNIFICANT CHANGE UP (ref 80–100)
NON HDL CHOLESTEROL: 81 MG/DL — SIGNIFICANT CHANGE UP
NRBC # BLD: 0 /100 WBCS — SIGNIFICANT CHANGE UP (ref 0–0)
PHOSPHATE SERPL-MCNC: 2.8 MG/DL — SIGNIFICANT CHANGE UP (ref 2.5–4.5)
PHOSPHATE SERPL-MCNC: 4.2 MG/DL — SIGNIFICANT CHANGE UP (ref 2.5–4.5)
PLATELET # BLD AUTO: 217 K/UL — SIGNIFICANT CHANGE UP (ref 150–400)
POTASSIUM SERPL-MCNC: 3.9 MMOL/L — SIGNIFICANT CHANGE UP (ref 3.5–5.3)
POTASSIUM SERPL-SCNC: 3.9 MMOL/L — SIGNIFICANT CHANGE UP (ref 3.5–5.3)
PROT SERPL-MCNC: 7.3 G/DL — SIGNIFICANT CHANGE UP (ref 6–8.3)
RBC # BLD: 4.13 M/UL — SIGNIFICANT CHANGE UP (ref 3.8–5.2)
RBC # FLD: 11.5 % — SIGNIFICANT CHANGE UP (ref 10.3–14.5)
SODIUM SERPL-SCNC: 141 MMOL/L — SIGNIFICANT CHANGE UP (ref 135–145)
TRIGL SERPL-MCNC: 54 MG/DL — SIGNIFICANT CHANGE UP
TROPONIN I, HIGH SENSITIVITY RESULT: 23.5 NG/L — SIGNIFICANT CHANGE UP
TROPONIN I, HIGH SENSITIVITY RESULT: 25.7 NG/L — SIGNIFICANT CHANGE UP
WBC # BLD: 8.54 K/UL — SIGNIFICANT CHANGE UP (ref 3.8–10.5)
WBC # FLD AUTO: 8.54 K/UL — SIGNIFICANT CHANGE UP (ref 3.8–10.5)

## 2024-06-13 PROCEDURE — 93970 EXTREMITY STUDY: CPT | Mod: 26

## 2024-06-13 PROCEDURE — 71045 X-RAY EXAM CHEST 1 VIEW: CPT | Mod: 26

## 2024-06-13 PROCEDURE — 70551 MRI BRAIN STEM W/O DYE: CPT | Mod: 26

## 2024-06-13 PROCEDURE — 99223 1ST HOSP IP/OBS HIGH 75: CPT

## 2024-06-13 PROCEDURE — 78582 LUNG VENTILAT&PERFUS IMAGING: CPT | Mod: 26

## 2024-06-13 RX ORDER — NITROGLYCERIN 2.5 MG
0.4 CAPSULE, EXTENDED RELEASE ORAL
Refills: 0 | Status: DISCONTINUED | OUTPATIENT
Start: 2024-06-13 | End: 2024-06-19

## 2024-06-13 RX ORDER — ASPIRIN 325 MG/1
81 TABLET, FILM COATED ORAL DAILY
Refills: 0 | Status: DISCONTINUED | OUTPATIENT
Start: 2024-06-13 | End: 2024-06-14

## 2024-06-13 RX ORDER — FAMOTIDINE 40 MG
20 TABLET ORAL DAILY
Refills: 0 | Status: DISCONTINUED | OUTPATIENT
Start: 2024-06-13 | End: 2024-06-19

## 2024-06-13 RX ORDER — VALSARTAN 320 MG/1
80 TABLET ORAL DAILY
Refills: 0 | Status: DISCONTINUED | OUTPATIENT
Start: 2024-06-13 | End: 2024-06-19

## 2024-06-13 RX ORDER — BENZONATATE 100 MG/1
100 TABLET ORAL THREE TIMES A DAY
Refills: 0 | Status: DISCONTINUED | OUTPATIENT
Start: 2024-06-13 | End: 2024-06-19

## 2024-06-13 RX ORDER — BUDESONIDE/FORMOTEROL FUMARATE 160-4.5MCG
2 HFA AEROSOL WITH ADAPTER (GRAM) INHALATION
Refills: 0 | Status: DISCONTINUED | OUTPATIENT
Start: 2024-06-13 | End: 2024-06-19

## 2024-06-13 RX ORDER — ERGOCALCIFEROL 1.25 MG/1
50000 CAPSULE ORAL
Refills: 0 | Status: DISCONTINUED | OUTPATIENT
Start: 2024-06-13 | End: 2024-06-19

## 2024-06-13 RX ORDER — MONTELUKAST SODIUM 10 MG/1
10 TABLET, FILM COATED ORAL DAILY
Refills: 0 | Status: DISCONTINUED | OUTPATIENT
Start: 2024-06-13 | End: 2024-06-19

## 2024-06-13 RX ORDER — FERROUS SULFATE 325(65) MG
325 TABLET ORAL
Refills: 0 | Status: DISCONTINUED | OUTPATIENT
Start: 2024-06-13 | End: 2024-06-19

## 2024-06-13 RX ORDER — CLOPIDOGREL BISULFATE 75 MG/1
75 TABLET, FILM COATED ORAL DAILY
Refills: 0 | Status: DISCONTINUED | OUTPATIENT
Start: 2024-06-13 | End: 2024-06-14

## 2024-06-13 RX ORDER — ENOXAPARIN SODIUM 100 MG/ML
75 INJECTION SUBCUTANEOUS EVERY 12 HOURS
Refills: 0 | Status: DISCONTINUED | OUTPATIENT
Start: 2024-06-13 | End: 2024-06-17

## 2024-06-13 RX ORDER — CRANBERRY FRUIT EXTRACT 650 MG
2 CAPSULE ORAL
Refills: 0 | Status: DISCONTINUED | OUTPATIENT
Start: 2024-06-13 | End: 2024-06-19

## 2024-06-13 RX ORDER — LIDOCAINE HCL 28 MG/G
2 GEL TOPICAL DAILY
Refills: 0 | Status: DISCONTINUED | OUTPATIENT
Start: 2024-06-13 | End: 2024-06-19

## 2024-06-13 RX ORDER — ASPIRIN 325 MG/1
324 TABLET, FILM COATED ORAL ONCE
Refills: 0 | Status: COMPLETED | OUTPATIENT
Start: 2024-06-13 | End: 2024-06-13

## 2024-06-13 RX ORDER — ACETAMINOPHEN 325 MG
1000 TABLET ORAL ONCE
Refills: 0 | Status: COMPLETED | OUTPATIENT
Start: 2024-06-13 | End: 2024-06-13

## 2024-06-13 RX ORDER — BACLOFEN 20 MG
5 TABLET ORAL EVERY 8 HOURS
Refills: 0 | Status: DISCONTINUED | OUTPATIENT
Start: 2024-06-13 | End: 2024-06-19

## 2024-06-13 RX ORDER — MORPHINE SULFATE 100 MG/1
2 TABLET, EXTENDED RELEASE ORAL ONCE
Refills: 0 | Status: DISCONTINUED | OUTPATIENT
Start: 2024-06-13 | End: 2024-06-13

## 2024-06-13 RX ORDER — ATORVASTATIN CALCIUM 20 MG/1
80 TABLET, FILM COATED ORAL AT BEDTIME
Refills: 0 | Status: DISCONTINUED | OUTPATIENT
Start: 2024-06-13 | End: 2024-06-14

## 2024-06-13 RX ADMIN — ENOXAPARIN SODIUM 75 MILLIGRAM(S): 100 INJECTION SUBCUTANEOUS at 17:31

## 2024-06-13 RX ADMIN — Medication 2 GRAM(S): at 17:30

## 2024-06-13 RX ADMIN — Medication 400 MILLIGRAM(S): at 21:43

## 2024-06-13 RX ADMIN — VALSARTAN 80 MILLIGRAM(S): 320 TABLET ORAL at 06:09

## 2024-06-13 RX ADMIN — OXYCODONE HYDROCHLORIDE 5 MILLIGRAM(S): 100 SOLUTION ORAL at 22:42

## 2024-06-13 RX ADMIN — ASPIRIN 81 MILLIGRAM(S): 325 TABLET, FILM COATED ORAL at 12:11

## 2024-06-13 RX ADMIN — Medication 0.4 MILLIGRAM(S): at 01:21

## 2024-06-13 RX ADMIN — CLOPIDOGREL BISULFATE 75 MILLIGRAM(S): 75 TABLET, FILM COATED ORAL at 12:11

## 2024-06-13 RX ADMIN — MONTELUKAST SODIUM 10 MILLIGRAM(S): 10 TABLET, FILM COATED ORAL at 12:11

## 2024-06-13 RX ADMIN — ATORVASTATIN CALCIUM 80 MILLIGRAM(S): 20 TABLET, FILM COATED ORAL at 21:42

## 2024-06-13 RX ADMIN — ERGOCALCIFEROL 50000 UNIT(S): 1.25 CAPSULE ORAL at 12:10

## 2024-06-13 RX ADMIN — DEXTROSE MONOHYDRATE AND SODIUM CHLORIDE 75 MILLILITER(S): 5; .3 INJECTION, SOLUTION INTRAVENOUS at 06:19

## 2024-06-13 RX ADMIN — MORPHINE SULFATE 2 MILLIGRAM(S): 100 TABLET, EXTENDED RELEASE ORAL at 01:45

## 2024-06-13 RX ADMIN — ASPIRIN 324 MILLIGRAM(S): 325 TABLET, FILM COATED ORAL at 00:41

## 2024-06-13 RX ADMIN — Medication 325 MILLIGRAM(S): at 06:09

## 2024-06-13 RX ADMIN — Medication 20 MILLIGRAM(S): at 12:10

## 2024-06-13 RX ADMIN — Medication 2 GRAM(S): at 06:08

## 2024-06-13 RX ADMIN — Medication 2 PUFF(S): at 10:18

## 2024-06-13 RX ADMIN — LIDOCAINE HCL 2 PATCH: 28 GEL TOPICAL at 23:18

## 2024-06-13 RX ADMIN — Medication 2 PUFF(S): at 23:19

## 2024-06-13 RX ADMIN — OXYCODONE HYDROCHLORIDE 5 MILLIGRAM(S): 100 SOLUTION ORAL at 21:42

## 2024-06-13 RX ADMIN — Medication 0.4 MILLIGRAM(S): at 01:15

## 2024-06-13 RX ADMIN — MORPHINE SULFATE 2 MILLIGRAM(S): 100 TABLET, EXTENDED RELEASE ORAL at 02:05

## 2024-06-13 RX ADMIN — Medication 1000 MILLIGRAM(S): at 22:43

## 2024-06-13 RX ADMIN — Medication 325 MILLIGRAM(S): at 17:31

## 2024-06-14 DIAGNOSIS — R20.2 PARESTHESIA OF SKIN: ICD-10-CM

## 2024-06-14 PROCEDURE — G0452: CPT | Mod: 26

## 2024-06-14 PROCEDURE — 99233 SBSQ HOSP IP/OBS HIGH 50: CPT

## 2024-06-14 PROCEDURE — 71275 CT ANGIOGRAPHY CHEST: CPT | Mod: 26

## 2024-06-14 RX ORDER — ACETAMINOPHEN 325 MG
650 TABLET ORAL EVERY 6 HOURS
Refills: 0 | Status: DISCONTINUED | OUTPATIENT
Start: 2024-06-14 | End: 2024-06-19

## 2024-06-14 RX ORDER — BENZOCAINE AND MENTHOL 15; 3.6 MG/1; MG/1
1 LOZENGE ORAL
Refills: 0 | Status: DISCONTINUED | OUTPATIENT
Start: 2024-06-14 | End: 2024-06-19

## 2024-06-14 RX ORDER — ATORVASTATIN CALCIUM 20 MG/1
40 TABLET, FILM COATED ORAL AT BEDTIME
Refills: 0 | Status: DISCONTINUED | OUTPATIENT
Start: 2024-06-14 | End: 2024-06-19

## 2024-06-14 RX ADMIN — LIDOCAINE HCL 2 PATCH: 28 GEL TOPICAL at 08:01

## 2024-06-14 RX ADMIN — Medication 325 MILLIGRAM(S): at 17:11

## 2024-06-14 RX ADMIN — LIDOCAINE HCL 2 PATCH: 28 GEL TOPICAL at 12:04

## 2024-06-14 RX ADMIN — Medication 2 GRAM(S): at 05:52

## 2024-06-14 RX ADMIN — Medication 2 PUFF(S): at 11:57

## 2024-06-14 RX ADMIN — LIDOCAINE HCL 2 PATCH: 28 GEL TOPICAL at 12:02

## 2024-06-14 RX ADMIN — ENOXAPARIN SODIUM 75 MILLIGRAM(S): 100 INJECTION SUBCUTANEOUS at 05:52

## 2024-06-14 RX ADMIN — MONTELUKAST SODIUM 10 MILLIGRAM(S): 10 TABLET, FILM COATED ORAL at 11:57

## 2024-06-14 RX ADMIN — LIDOCAINE HCL 2 PATCH: 28 GEL TOPICAL at 19:45

## 2024-06-14 RX ADMIN — Medication 2 PUFF(S): at 21:23

## 2024-06-14 RX ADMIN — Medication 325 MILLIGRAM(S): at 05:53

## 2024-06-14 RX ADMIN — ATORVASTATIN CALCIUM 40 MILLIGRAM(S): 20 TABLET, FILM COATED ORAL at 21:23

## 2024-06-14 RX ADMIN — Medication 20 MILLIGRAM(S): at 11:57

## 2024-06-14 RX ADMIN — Medication 2 GRAM(S): at 17:11

## 2024-06-14 RX ADMIN — VALSARTAN 80 MILLIGRAM(S): 320 TABLET ORAL at 05:53

## 2024-06-14 RX ADMIN — ENOXAPARIN SODIUM 75 MILLIGRAM(S): 100 INJECTION SUBCUTANEOUS at 17:12

## 2024-06-15 LAB
ANION GAP SERPL CALC-SCNC: 5 MMOL/L — SIGNIFICANT CHANGE UP (ref 5–17)
B2 GLYCOPROT1 AB SER QL: NEGATIVE — SIGNIFICANT CHANGE UP
BUN SERPL-MCNC: 10 MG/DL — SIGNIFICANT CHANGE UP (ref 7–18)
CALCIUM SERPL-MCNC: 9.1 MG/DL — SIGNIFICANT CHANGE UP (ref 8.4–10.5)
CARDIOLIPIN AB SER-ACNC: NEGATIVE — SIGNIFICANT CHANGE UP
CHLORIDE SERPL-SCNC: 108 MMOL/L — SIGNIFICANT CHANGE UP (ref 96–108)
CO2 SERPL-SCNC: 28 MMOL/L — SIGNIFICANT CHANGE UP (ref 22–31)
CREAT SERPL-MCNC: 0.6 MG/DL — SIGNIFICANT CHANGE UP (ref 0.5–1.3)
EGFR: 100 ML/MIN/1.73M2 — SIGNIFICANT CHANGE UP
GLUCOSE SERPL-MCNC: 105 MG/DL — HIGH (ref 70–99)
HCT VFR BLD CALC: 38.3 % — SIGNIFICANT CHANGE UP (ref 34.5–45)
HCYS SERPL-MCNC: 12.9 UMOL/L — SIGNIFICANT CHANGE UP
HGB BLD-MCNC: 12.5 G/DL — SIGNIFICANT CHANGE UP (ref 11.5–15.5)
MAGNESIUM SERPL-MCNC: 2.2 MG/DL — SIGNIFICANT CHANGE UP (ref 1.6–2.6)
MCHC RBC-ENTMCNC: 31.9 PG — SIGNIFICANT CHANGE UP (ref 27–34)
MCHC RBC-ENTMCNC: 32.6 GM/DL — SIGNIFICANT CHANGE UP (ref 32–36)
MCV RBC AUTO: 97.7 FL — SIGNIFICANT CHANGE UP (ref 80–100)
NRBC # BLD: 0 /100 WBCS — SIGNIFICANT CHANGE UP (ref 0–0)
PHOSPHATE SERPL-MCNC: 3.4 MG/DL — SIGNIFICANT CHANGE UP (ref 2.5–4.5)
PLATELET # BLD AUTO: 194 K/UL — SIGNIFICANT CHANGE UP (ref 150–400)
POTASSIUM SERPL-MCNC: 3.9 MMOL/L — SIGNIFICANT CHANGE UP (ref 3.5–5.3)
POTASSIUM SERPL-SCNC: 3.9 MMOL/L — SIGNIFICANT CHANGE UP (ref 3.5–5.3)
RBC # BLD: 3.92 M/UL — SIGNIFICANT CHANGE UP (ref 3.8–5.2)
RBC # FLD: 11.7 % — SIGNIFICANT CHANGE UP (ref 10.3–14.5)
SODIUM SERPL-SCNC: 141 MMOL/L — SIGNIFICANT CHANGE UP (ref 135–145)
WBC # BLD: 6.85 K/UL — SIGNIFICANT CHANGE UP (ref 3.8–10.5)
WBC # FLD AUTO: 6.85 K/UL — SIGNIFICANT CHANGE UP (ref 3.8–10.5)

## 2024-06-15 PROCEDURE — 99233 SBSQ HOSP IP/OBS HIGH 50: CPT

## 2024-06-15 RX ORDER — DIPHENHYDRAMINE HCL 12.5MG/5ML
25 ELIXIR ORAL ONCE
Refills: 0 | Status: COMPLETED | OUTPATIENT
Start: 2024-06-15 | End: 2024-06-15

## 2024-06-15 RX ORDER — BISACODYL 5 MG
10 TABLET, DELAYED RELEASE (ENTERIC COATED) ORAL ONCE
Refills: 0 | Status: DISCONTINUED | OUTPATIENT
Start: 2024-06-15 | End: 2024-06-15

## 2024-06-15 RX ORDER — BISACODYL 5 MG
5 TABLET, DELAYED RELEASE (ENTERIC COATED) ORAL EVERY 12 HOURS
Refills: 0 | Status: DISCONTINUED | OUTPATIENT
Start: 2024-06-15 | End: 2024-06-19

## 2024-06-15 RX ORDER — MAGNESIUM SULFATE 100 %
1 POWDER (GRAM) MISCELLANEOUS ONCE
Refills: 0 | Status: COMPLETED | OUTPATIENT
Start: 2024-06-15 | End: 2024-06-15

## 2024-06-15 RX ORDER — VALPROIC ACID 250 MG/5ML
500 SOLUTION ORAL ONCE
Refills: 0 | Status: COMPLETED | OUTPATIENT
Start: 2024-06-15 | End: 2024-06-15

## 2024-06-15 RX ORDER — METOCLOPRAMIDE 5 MG/5ML
10 SOLUTION ORAL ONCE
Refills: 0 | Status: COMPLETED | OUTPATIENT
Start: 2024-06-15 | End: 2024-06-15

## 2024-06-15 RX ADMIN — Medication 5 MILLIGRAM(S): at 18:09

## 2024-06-15 RX ADMIN — Medication 650 MILLIGRAM(S): at 06:01

## 2024-06-15 RX ADMIN — Medication 2 GRAM(S): at 18:00

## 2024-06-15 RX ADMIN — Medication 2 PUFF(S): at 11:36

## 2024-06-15 RX ADMIN — LIDOCAINE HCL 2 PATCH: 28 GEL TOPICAL at 19:16

## 2024-06-15 RX ADMIN — Medication 650 MILLIGRAM(S): at 04:47

## 2024-06-15 RX ADMIN — Medication 325 MILLIGRAM(S): at 06:08

## 2024-06-15 RX ADMIN — MONTELUKAST SODIUM 10 MILLIGRAM(S): 10 TABLET, FILM COATED ORAL at 11:36

## 2024-06-15 RX ADMIN — Medication 650 MILLIGRAM(S): at 21:36

## 2024-06-15 RX ADMIN — METOCLOPRAMIDE 10 MILLIGRAM(S): 5 SOLUTION ORAL at 13:10

## 2024-06-15 RX ADMIN — LIDOCAINE HCL 2 PATCH: 28 GEL TOPICAL at 00:19

## 2024-06-15 RX ADMIN — ENOXAPARIN SODIUM 75 MILLIGRAM(S): 100 INJECTION SUBCUTANEOUS at 06:08

## 2024-06-15 RX ADMIN — LIDOCAINE HCL 2 PATCH: 28 GEL TOPICAL at 11:38

## 2024-06-15 RX ADMIN — Medication 325 MILLIGRAM(S): at 18:01

## 2024-06-15 RX ADMIN — Medication 650 MILLIGRAM(S): at 12:27

## 2024-06-15 RX ADMIN — Medication 100 GRAM(S): at 12:47

## 2024-06-15 RX ADMIN — Medication 2 PUFF(S): at 21:33

## 2024-06-15 RX ADMIN — Medication 25 MILLIGRAM(S): at 18:10

## 2024-06-15 RX ADMIN — Medication 2 GRAM(S): at 06:09

## 2024-06-15 RX ADMIN — LIDOCAINE HCL 2 PATCH: 28 GEL TOPICAL at 23:00

## 2024-06-15 RX ADMIN — VALPROIC ACID 55 MILLIGRAM(S): 250 SOLUTION ORAL at 13:00

## 2024-06-15 RX ADMIN — Medication 650 MILLIGRAM(S): at 22:17

## 2024-06-15 RX ADMIN — ATORVASTATIN CALCIUM 40 MILLIGRAM(S): 20 TABLET, FILM COATED ORAL at 21:33

## 2024-06-15 RX ADMIN — VALSARTAN 80 MILLIGRAM(S): 320 TABLET ORAL at 06:09

## 2024-06-15 RX ADMIN — Medication 650 MILLIGRAM(S): at 11:37

## 2024-06-15 RX ADMIN — Medication 20 MILLIGRAM(S): at 11:36

## 2024-06-15 RX ADMIN — ENOXAPARIN SODIUM 75 MILLIGRAM(S): 100 INJECTION SUBCUTANEOUS at 18:01

## 2024-06-16 ENCOUNTER — TRANSCRIPTION ENCOUNTER (OUTPATIENT)
Age: 64
End: 2024-06-16

## 2024-06-16 RX ORDER — APIXABAN 5 MG/1
1 TABLET, FILM COATED ORAL
Qty: 1 | Refills: 0
Start: 2024-06-16

## 2024-06-16 RX ORDER — VALPROIC ACID 250 MG/5ML
500 SOLUTION ORAL ONCE
Refills: 0 | Status: COMPLETED | OUTPATIENT
Start: 2024-06-16 | End: 2024-06-16

## 2024-06-16 RX ORDER — DIVALPROEX SODIUM 500 MG
500 TABLET, DELAYED RELEASE (ENTERIC COATED) ORAL AT BEDTIME
Refills: 0 | Status: COMPLETED | OUTPATIENT
Start: 2024-06-16 | End: 2024-06-16

## 2024-06-16 RX ORDER — DIVALPROEX SODIUM 500 MG
500 TABLET, DELAYED RELEASE (ENTERIC COATED) ORAL AT BEDTIME
Refills: 0 | Status: DISCONTINUED | OUTPATIENT
Start: 2024-06-17 | End: 2024-06-19

## 2024-06-16 RX ORDER — DIVALPROEX SODIUM 500 MG
500 TABLET, DELAYED RELEASE (ENTERIC COATED) ORAL ONCE
Refills: 0 | Status: COMPLETED | OUTPATIENT
Start: 2024-06-16 | End: 2024-06-16

## 2024-06-16 RX ADMIN — Medication 650 MILLIGRAM(S): at 12:12

## 2024-06-16 RX ADMIN — Medication 325 MILLIGRAM(S): at 05:48

## 2024-06-16 RX ADMIN — Medication 500 MILLIGRAM(S): at 11:13

## 2024-06-16 RX ADMIN — LIDOCAINE HCL 2 PATCH: 28 GEL TOPICAL at 19:34

## 2024-06-16 RX ADMIN — ATORVASTATIN CALCIUM 40 MILLIGRAM(S): 20 TABLET, FILM COATED ORAL at 21:41

## 2024-06-16 RX ADMIN — ENOXAPARIN SODIUM 75 MILLIGRAM(S): 100 INJECTION SUBCUTANEOUS at 05:47

## 2024-06-16 RX ADMIN — MONTELUKAST SODIUM 10 MILLIGRAM(S): 10 TABLET, FILM COATED ORAL at 11:13

## 2024-06-16 RX ADMIN — Medication 650 MILLIGRAM(S): at 11:19

## 2024-06-16 RX ADMIN — Medication 325 MILLIGRAM(S): at 17:28

## 2024-06-16 RX ADMIN — ENOXAPARIN SODIUM 75 MILLIGRAM(S): 100 INJECTION SUBCUTANEOUS at 17:28

## 2024-06-16 RX ADMIN — Medication 2 GRAM(S): at 05:47

## 2024-06-16 RX ADMIN — Medication 2 GRAM(S): at 17:28

## 2024-06-16 RX ADMIN — VALPROIC ACID 55 MILLIGRAM(S): 250 SOLUTION ORAL at 13:57

## 2024-06-16 RX ADMIN — Medication 5 MILLIGRAM(S): at 17:28

## 2024-06-16 RX ADMIN — LIDOCAINE HCL 2 PATCH: 28 GEL TOPICAL at 11:13

## 2024-06-16 RX ADMIN — Medication 500 MILLIGRAM(S): at 21:40

## 2024-06-16 RX ADMIN — VALSARTAN 80 MILLIGRAM(S): 320 TABLET ORAL at 05:47

## 2024-06-16 RX ADMIN — Medication 2 PUFF(S): at 11:14

## 2024-06-16 RX ADMIN — Medication 20 MILLIGRAM(S): at 11:13

## 2024-06-16 RX ADMIN — LIDOCAINE HCL 2 PATCH: 28 GEL TOPICAL at 23:37

## 2024-06-16 RX ADMIN — Medication 2 PUFF(S): at 21:41

## 2024-06-17 LAB
APTT 50/50 2HOUR INCUB: 41 SEC — HIGH (ref 24.5–36.6)
APTT BLD: 36.7 SEC — HIGH (ref 24.5–36.6)
APTT BLD: 41.1 SEC — HIGH (ref 24.5–35.6)
APTT BLD: 45.9 SEC — HIGH (ref 24.5–35.6)
AT III ACT/NOR PPP CHRO: 115 % — SIGNIFICANT CHANGE UP (ref 85–135)
D DIMER BLD IA.RAPID-MCNC: 152 NG/ML DDU — SIGNIFICANT CHANGE UP
DRVVT RATIO: 0.96 RATIO — SIGNIFICANT CHANGE UP (ref 0–1.21)
DRVVT SCREEN TO CONFIRM RATIO: SIGNIFICANT CHANGE UP
PAT CTL 2H: 38.3 SEC — HIGH (ref 24.5–36.6)
PROT C ACT/NOR PPP: 125 % — SIGNIFICANT CHANGE UP (ref 74–150)

## 2024-06-17 RX ORDER — APIXABAN 5 MG/1
10 TABLET, FILM COATED ORAL EVERY 12 HOURS
Refills: 0 | Status: DISCONTINUED | OUTPATIENT
Start: 2024-06-17 | End: 2024-06-18

## 2024-06-17 RX ORDER — MORPHINE SULFATE 100 MG/1
2 TABLET, EXTENDED RELEASE ORAL ONCE
Refills: 0 | Status: DISCONTINUED | OUTPATIENT
Start: 2024-06-17 | End: 2024-06-17

## 2024-06-17 RX ORDER — ONDANSETRON HYDROCHLORIDE 2 MG/ML
4 INJECTION INTRAMUSCULAR; INTRAVENOUS ONCE
Refills: 0 | Status: COMPLETED | OUTPATIENT
Start: 2024-06-17 | End: 2024-06-17

## 2024-06-17 RX ADMIN — LIDOCAINE HCL 2 PATCH: 28 GEL TOPICAL at 23:00

## 2024-06-17 RX ADMIN — OXYCODONE HYDROCHLORIDE 5 MILLIGRAM(S): 100 SOLUTION ORAL at 06:37

## 2024-06-17 RX ADMIN — Medication 5 MILLIGRAM(S): at 08:08

## 2024-06-17 RX ADMIN — Medication 325 MILLIGRAM(S): at 05:25

## 2024-06-17 RX ADMIN — ONDANSETRON HYDROCHLORIDE 4 MILLIGRAM(S): 2 INJECTION INTRAMUSCULAR; INTRAVENOUS at 09:25

## 2024-06-17 RX ADMIN — BENZONATATE 100 MILLIGRAM(S): 100 TABLET ORAL at 05:25

## 2024-06-17 RX ADMIN — LIDOCAINE HCL 2 PATCH: 28 GEL TOPICAL at 19:51

## 2024-06-17 RX ADMIN — Medication 20 MILLIGRAM(S): at 11:39

## 2024-06-17 RX ADMIN — Medication 2 PUFF(S): at 09:40

## 2024-06-17 RX ADMIN — Medication 2 GRAM(S): at 17:12

## 2024-06-17 RX ADMIN — APIXABAN 10 MILLIGRAM(S): 5 TABLET, FILM COATED ORAL at 17:12

## 2024-06-17 RX ADMIN — VALSARTAN 80 MILLIGRAM(S): 320 TABLET ORAL at 05:25

## 2024-06-17 RX ADMIN — Medication 325 MILLIGRAM(S): at 17:13

## 2024-06-17 RX ADMIN — Medication 2 GRAM(S): at 05:25

## 2024-06-17 RX ADMIN — ENOXAPARIN SODIUM 75 MILLIGRAM(S): 100 INJECTION SUBCUTANEOUS at 05:25

## 2024-06-17 RX ADMIN — ATORVASTATIN CALCIUM 40 MILLIGRAM(S): 20 TABLET, FILM COATED ORAL at 21:13

## 2024-06-17 RX ADMIN — Medication 650 MILLIGRAM(S): at 06:27

## 2024-06-17 RX ADMIN — Medication 2 PUFF(S): at 21:13

## 2024-06-17 RX ADMIN — OXYCODONE HYDROCHLORIDE 5 MILLIGRAM(S): 100 SOLUTION ORAL at 21:13

## 2024-06-17 RX ADMIN — MORPHINE SULFATE 2 MILLIGRAM(S): 100 TABLET, EXTENDED RELEASE ORAL at 09:34

## 2024-06-17 RX ADMIN — OXYCODONE HYDROCHLORIDE 5 MILLIGRAM(S): 100 SOLUTION ORAL at 07:42

## 2024-06-17 RX ADMIN — MORPHINE SULFATE 2 MILLIGRAM(S): 100 TABLET, EXTENDED RELEASE ORAL at 10:16

## 2024-06-17 RX ADMIN — Medication 650 MILLIGRAM(S): at 05:27

## 2024-06-17 RX ADMIN — OXYCODONE HYDROCHLORIDE 5 MILLIGRAM(S): 100 SOLUTION ORAL at 22:07

## 2024-06-17 RX ADMIN — Medication 500 MILLIGRAM(S): at 21:13

## 2024-06-17 RX ADMIN — MONTELUKAST SODIUM 10 MILLIGRAM(S): 10 TABLET, FILM COATED ORAL at 11:39

## 2024-06-17 RX ADMIN — LIDOCAINE HCL 2 PATCH: 28 GEL TOPICAL at 11:39

## 2024-06-18 LAB
ANION GAP SERPL CALC-SCNC: 4 MMOL/L — LOW (ref 5–17)
APCR PPP: 2.83 RATIO — SIGNIFICANT CHANGE UP
BUN SERPL-MCNC: 11 MG/DL — SIGNIFICANT CHANGE UP (ref 7–18)
CALCIUM SERPL-MCNC: 9.5 MG/DL — SIGNIFICANT CHANGE UP (ref 8.4–10.5)
CARDIOLIPIN AB SER-ACNC: NEGATIVE — SIGNIFICANT CHANGE UP
CHLORIDE SERPL-SCNC: 106 MMOL/L — SIGNIFICANT CHANGE UP (ref 96–108)
CO2 SERPL-SCNC: 30 MMOL/L — SIGNIFICANT CHANGE UP (ref 22–31)
CREAT SERPL-MCNC: 0.66 MG/DL — SIGNIFICANT CHANGE UP (ref 0.5–1.3)
DRVVT RATIO: 0.98 RATIO — SIGNIFICANT CHANGE UP (ref 0–1.21)
DRVVT SCREEN TO CONFIRM RATIO: SIGNIFICANT CHANGE UP
EGFR: 98 ML/MIN/1.73M2 — SIGNIFICANT CHANGE UP
GLUCOSE SERPL-MCNC: 94 MG/DL — SIGNIFICANT CHANGE UP (ref 70–99)
HCT VFR BLD CALC: 41 % — SIGNIFICANT CHANGE UP (ref 34.5–45)
HGB BLD-MCNC: 13.6 G/DL — SIGNIFICANT CHANGE UP (ref 11.5–15.5)
MCHC RBC-ENTMCNC: 32.3 PG — SIGNIFICANT CHANGE UP (ref 27–34)
MCHC RBC-ENTMCNC: 33.2 GM/DL — SIGNIFICANT CHANGE UP (ref 32–36)
MCV RBC AUTO: 97.4 FL — SIGNIFICANT CHANGE UP (ref 80–100)
NORMALIZED SCT PPP-RTO: 0.93 RATIO — SIGNIFICANT CHANGE UP (ref 0–1.16)
NORMALIZED SCT PPP-RTO: SIGNIFICANT CHANGE UP
NRBC # BLD: 0 /100 WBCS — SIGNIFICANT CHANGE UP (ref 0–0)
PLATELET # BLD AUTO: 225 K/UL — SIGNIFICANT CHANGE UP (ref 150–400)
POTASSIUM SERPL-MCNC: 4.1 MMOL/L — SIGNIFICANT CHANGE UP (ref 3.5–5.3)
POTASSIUM SERPL-SCNC: 4.1 MMOL/L — SIGNIFICANT CHANGE UP (ref 3.5–5.3)
PROT S FREE PPP-ACNC: 99 % — SIGNIFICANT CHANGE UP (ref 63–140)
RBC # BLD: 4.21 M/UL — SIGNIFICANT CHANGE UP (ref 3.8–5.2)
RBC # FLD: 11.7 % — SIGNIFICANT CHANGE UP (ref 10.3–14.5)
SODIUM SERPL-SCNC: 140 MMOL/L — SIGNIFICANT CHANGE UP (ref 135–145)
TROPONIN I, HIGH SENSITIVITY RESULT: 17.9 NG/L — SIGNIFICANT CHANGE UP
WBC # BLD: 6.85 K/UL — SIGNIFICANT CHANGE UP (ref 3.8–10.5)
WBC # FLD AUTO: 6.85 K/UL — SIGNIFICANT CHANGE UP (ref 3.8–10.5)

## 2024-06-18 RX ORDER — ENOXAPARIN SODIUM 100 MG/ML
40 INJECTION SUBCUTANEOUS EVERY 24 HOURS
Refills: 0 | Status: DISCONTINUED | OUTPATIENT
Start: 2024-06-18 | End: 2024-06-19

## 2024-06-18 RX ORDER — MORPHINE SULFATE 100 MG/1
1 TABLET, EXTENDED RELEASE ORAL ONCE
Refills: 0 | Status: DISCONTINUED | OUTPATIENT
Start: 2024-06-18 | End: 2024-06-18

## 2024-06-18 RX ORDER — ONDANSETRON HYDROCHLORIDE 2 MG/ML
4 INJECTION INTRAMUSCULAR; INTRAVENOUS ONCE
Refills: 0 | Status: COMPLETED | OUTPATIENT
Start: 2024-06-18 | End: 2024-06-18

## 2024-06-18 RX ORDER — FLUTICASONE PROPIONATE 50 UG/1
1 SPRAY, METERED NASAL
Refills: 0 | Status: DISCONTINUED | OUTPATIENT
Start: 2024-06-18 | End: 2024-06-19

## 2024-06-18 RX ADMIN — Medication 500 MILLIGRAM(S): at 21:46

## 2024-06-18 RX ADMIN — Medication 10 MILLIGRAM(S): at 20:17

## 2024-06-18 RX ADMIN — MONTELUKAST SODIUM 10 MILLIGRAM(S): 10 TABLET, FILM COATED ORAL at 12:25

## 2024-06-18 RX ADMIN — VALSARTAN 80 MILLIGRAM(S): 320 TABLET ORAL at 05:47

## 2024-06-18 RX ADMIN — Medication 20 MILLIGRAM(S): at 12:25

## 2024-06-18 RX ADMIN — FLUTICASONE PROPIONATE 1 SPRAY(S): 50 SPRAY, METERED NASAL at 17:18

## 2024-06-18 RX ADMIN — Medication 650 MILLIGRAM(S): at 15:04

## 2024-06-18 RX ADMIN — LIDOCAINE HCL 2 PATCH: 28 GEL TOPICAL at 19:20

## 2024-06-18 RX ADMIN — Medication 325 MILLIGRAM(S): at 05:47

## 2024-06-18 RX ADMIN — LIDOCAINE HCL 2 PATCH: 28 GEL TOPICAL at 12:26

## 2024-06-18 RX ADMIN — ONDANSETRON HYDROCHLORIDE 4 MILLIGRAM(S): 2 INJECTION INTRAMUSCULAR; INTRAVENOUS at 20:17

## 2024-06-18 RX ADMIN — Medication 650 MILLIGRAM(S): at 14:33

## 2024-06-18 RX ADMIN — Medication 325 MILLIGRAM(S): at 17:18

## 2024-06-18 RX ADMIN — Medication 2 GRAM(S): at 17:17

## 2024-06-18 RX ADMIN — Medication 2 PUFF(S): at 12:27

## 2024-06-18 RX ADMIN — MORPHINE SULFATE 1 MILLIGRAM(S): 100 TABLET, EXTENDED RELEASE ORAL at 07:06

## 2024-06-18 RX ADMIN — APIXABAN 10 MILLIGRAM(S): 5 TABLET, FILM COATED ORAL at 05:47

## 2024-06-18 RX ADMIN — ATORVASTATIN CALCIUM 40 MILLIGRAM(S): 20 TABLET, FILM COATED ORAL at 21:46

## 2024-06-18 RX ADMIN — ENOXAPARIN SODIUM 40 MILLIGRAM(S): 100 INJECTION SUBCUTANEOUS at 21:46

## 2024-06-18 RX ADMIN — MORPHINE SULFATE 1 MILLIGRAM(S): 100 TABLET, EXTENDED RELEASE ORAL at 06:36

## 2024-06-18 RX ADMIN — Medication 2 PUFF(S): at 21:47

## 2024-06-18 RX ADMIN — Medication 2 GRAM(S): at 05:47

## 2024-06-19 ENCOUNTER — TRANSCRIPTION ENCOUNTER (OUTPATIENT)
Age: 64
End: 2024-06-19

## 2024-06-19 VITALS
DIASTOLIC BLOOD PRESSURE: 68 MMHG | SYSTOLIC BLOOD PRESSURE: 125 MMHG | TEMPERATURE: 98 F | HEART RATE: 76 BPM | OXYGEN SATURATION: 96 % | RESPIRATION RATE: 17 BRPM

## 2024-06-19 PROCEDURE — 85613 RUSSELL VIPER VENOM DILUTED: CPT

## 2024-06-19 PROCEDURE — 70498 CT ANGIOGRAPHY NECK: CPT | Mod: MC

## 2024-06-19 PROCEDURE — 85303 CLOT INHIBIT PROT C ACTIVITY: CPT

## 2024-06-19 PROCEDURE — 80053 COMPREHEN METABOLIC PANEL: CPT

## 2024-06-19 PROCEDURE — 83090 ASSAY OF HOMOCYSTEINE: CPT

## 2024-06-19 PROCEDURE — 81240 F2 GENE: CPT

## 2024-06-19 PROCEDURE — 86146 BETA-2 GLYCOPROTEIN ANTIBODY: CPT

## 2024-06-19 PROCEDURE — 71045 X-RAY EXAM CHEST 1 VIEW: CPT

## 2024-06-19 PROCEDURE — 84100 ASSAY OF PHOSPHORUS: CPT

## 2024-06-19 PROCEDURE — 85307 ASSAY ACTIVATED PROTEIN C: CPT

## 2024-06-19 PROCEDURE — 85306 CLOT INHIBIT PROT S FREE: CPT

## 2024-06-19 PROCEDURE — 71275 CT ANGIOGRAPHY CHEST: CPT | Mod: MC

## 2024-06-19 PROCEDURE — 85730 THROMBOPLASTIN TIME PARTIAL: CPT

## 2024-06-19 PROCEDURE — 93970 EXTREMITY STUDY: CPT

## 2024-06-19 PROCEDURE — 85300 ANTITHROMBIN III ACTIVITY: CPT

## 2024-06-19 PROCEDURE — 97110 THERAPEUTIC EXERCISES: CPT

## 2024-06-19 PROCEDURE — 82553 CREATINE MB FRACTION: CPT

## 2024-06-19 PROCEDURE — 36415 COLL VENOUS BLD VENIPUNCTURE: CPT

## 2024-06-19 PROCEDURE — 85379 FIBRIN DEGRADATION QUANT: CPT

## 2024-06-19 PROCEDURE — 80061 LIPID PANEL: CPT

## 2024-06-19 PROCEDURE — 85027 COMPLETE CBC AUTOMATED: CPT

## 2024-06-19 PROCEDURE — 0042T: CPT | Mod: MC

## 2024-06-19 PROCEDURE — 83036 HEMOGLOBIN GLYCOSYLATED A1C: CPT

## 2024-06-19 PROCEDURE — 70496 CT ANGIOGRAPHY HEAD: CPT | Mod: MC

## 2024-06-19 PROCEDURE — 80048 BASIC METABOLIC PNL TOTAL CA: CPT

## 2024-06-19 PROCEDURE — 97162 PT EVAL MOD COMPLEX 30 MIN: CPT

## 2024-06-19 PROCEDURE — 83605 ASSAY OF LACTIC ACID: CPT

## 2024-06-19 PROCEDURE — 94640 AIRWAY INHALATION TREATMENT: CPT

## 2024-06-19 PROCEDURE — 97116 GAIT TRAINING THERAPY: CPT

## 2024-06-19 PROCEDURE — 82550 ASSAY OF CK (CPK): CPT

## 2024-06-19 PROCEDURE — 81241 F5 GENE: CPT

## 2024-06-19 PROCEDURE — 82962 GLUCOSE BLOOD TEST: CPT

## 2024-06-19 PROCEDURE — 85732 THROMBOPLASTIN TIME PARTIAL: CPT

## 2024-06-19 PROCEDURE — 83735 ASSAY OF MAGNESIUM: CPT

## 2024-06-19 PROCEDURE — 86147 CARDIOLIPIN ANTIBODY EA IG: CPT

## 2024-06-19 PROCEDURE — 97530 THERAPEUTIC ACTIVITIES: CPT

## 2024-06-19 PROCEDURE — 93306 TTE W/DOPPLER COMPLETE: CPT

## 2024-06-19 PROCEDURE — 93005 ELECTROCARDIOGRAM TRACING: CPT

## 2024-06-19 PROCEDURE — 84484 ASSAY OF TROPONIN QUANT: CPT

## 2024-06-19 PROCEDURE — 78582 LUNG VENTILAT&PERFUS IMAGING: CPT | Mod: MC

## 2024-06-19 PROCEDURE — 70450 CT HEAD/BRAIN W/O DYE: CPT | Mod: MC

## 2024-06-19 PROCEDURE — 70551 MRI BRAIN STEM W/O DYE: CPT | Mod: MC

## 2024-06-19 RX ORDER — TIZANIDINE 4 MG/1
2 TABLET ORAL
Refills: 0 | DISCHARGE

## 2024-06-19 RX ORDER — FLUTICASONE PROPIONATE 50 UG/1
1 SPRAY, METERED NASAL
Qty: 1 | Refills: 0
Start: 2024-06-19 | End: 2024-06-28

## 2024-06-19 RX ORDER — BACLOFEN 100 %
1 POWDER (GRAM) MISCELLANEOUS
Refills: 0 | DISCHARGE

## 2024-06-19 RX ADMIN — Medication 2 GRAM(S): at 05:49

## 2024-06-19 RX ADMIN — VALSARTAN 80 MILLIGRAM(S): 320 TABLET ORAL at 05:49

## 2024-06-19 RX ADMIN — MONTELUKAST SODIUM 10 MILLIGRAM(S): 10 TABLET, FILM COATED ORAL at 11:30

## 2024-06-19 RX ADMIN — Medication 20 MILLIGRAM(S): at 11:29

## 2024-06-19 RX ADMIN — Medication 325 MILLIGRAM(S): at 05:49

## 2024-06-19 RX ADMIN — Medication 2 PUFF(S): at 11:30

## 2024-06-19 RX ADMIN — FLUTICASONE PROPIONATE 1 SPRAY(S): 50 SPRAY, METERED NASAL at 05:49

## 2024-06-19 RX ADMIN — LIDOCAINE HCL 2 PATCH: 28 GEL TOPICAL at 01:45

## 2024-06-19 RX ADMIN — LIDOCAINE HCL 2 PATCH: 28 GEL TOPICAL at 11:30

## 2024-06-20 PROBLEM — J30.2 OTHER SEASONAL ALLERGIC RHINITIS: Chronic | Status: ACTIVE | Noted: 2024-06-03

## 2024-06-20 PROBLEM — K21.9 GASTRO-ESOPHAGEAL REFLUX DISEASE WITHOUT ESOPHAGITIS: Chronic | Status: ACTIVE | Noted: 2024-06-03

## 2024-06-20 PROBLEM — D64.9 ANEMIA, UNSPECIFIED: Chronic | Status: ACTIVE | Noted: 2024-06-03

## 2024-06-20 PROBLEM — E55.9 VITAMIN D DEFICIENCY, UNSPECIFIED: Chronic | Status: ACTIVE | Noted: 2024-06-03

## 2024-06-20 PROBLEM — Z86.79 PERSONAL HISTORY OF OTHER DISEASES OF THE CIRCULATORY SYSTEM: Chronic | Status: ACTIVE | Noted: 2024-06-03

## 2024-06-20 PROBLEM — M51.26 OTHER INTERVERTEBRAL DISC DISPLACEMENT, LUMBAR REGION: Chronic | Status: ACTIVE | Noted: 2024-06-03

## 2024-06-20 LAB
B2 GLYCOPROT1 AB SER QL: NEGATIVE — SIGNIFICANT CHANGE UP
DNA PLOIDY SPEC FC-IMP: SIGNIFICANT CHANGE UP
PTR INTERPRETATION: SIGNIFICANT CHANGE UP

## 2024-06-26 PROBLEM — K42.9 UMBILICAL HERNIA WITHOUT OBSTRUCTION AND WITHOUT GANGRENE: Status: ACTIVE | Noted: 2024-05-29

## 2024-07-01 ENCOUNTER — APPOINTMENT (OUTPATIENT)
Dept: SURGERY | Facility: CLINIC | Age: 64
End: 2024-07-01
Payer: MEDICAID

## 2024-07-01 DIAGNOSIS — K42.9 UMBILICAL HERNIA W/OUT OBSTRUCTION OR GANGRENE: ICD-10-CM

## 2024-07-01 PROBLEM — R73.03 PREDIABETES: Chronic | Status: ACTIVE | Noted: 2024-06-12

## 2024-07-01 PROCEDURE — 99213 OFFICE O/P EST LOW 20 MIN: CPT

## 2024-07-18 ENCOUNTER — APPOINTMENT (OUTPATIENT)
Dept: NEUROSURGERY | Facility: CLINIC | Age: 64
End: 2024-07-18
Payer: MEDICAID

## 2024-07-18 VITALS
DIASTOLIC BLOOD PRESSURE: 60 MMHG | SYSTOLIC BLOOD PRESSURE: 114 MMHG | HEIGHT: 63 IN | OXYGEN SATURATION: 96 % | BODY MASS INDEX: 29.23 KG/M2 | WEIGHT: 165 LBS | TEMPERATURE: 98 F | HEART RATE: 82 BPM

## 2024-07-18 DIAGNOSIS — M54.9 DORSALGIA, UNSPECIFIED: ICD-10-CM

## 2024-07-18 DIAGNOSIS — R29.898 OTHER SYMPTOMS AND SIGNS INVOLVING THE MUSCULOSKELETAL SYSTEM: ICD-10-CM

## 2024-07-18 DIAGNOSIS — M54.16 RADICULOPATHY, LUMBAR REGION: ICD-10-CM

## 2024-07-18 DIAGNOSIS — M54.2 CERVICALGIA: ICD-10-CM

## 2024-07-18 DIAGNOSIS — M54.12 RADICULOPATHY, CERVICAL REGION: ICD-10-CM

## 2024-07-18 PROCEDURE — 99213 OFFICE O/P EST LOW 20 MIN: CPT

## 2024-07-21 ENCOUNTER — EMERGENCY (EMERGENCY)
Facility: HOSPITAL | Age: 64
LOS: 1 days | Discharge: HOME | End: 2024-07-21
Attending: EMERGENCY MEDICINE
Payer: MEDICAID

## 2024-07-21 VITALS
DIASTOLIC BLOOD PRESSURE: 72 MMHG | HEART RATE: 89 BPM | SYSTOLIC BLOOD PRESSURE: 135 MMHG | TEMPERATURE: 98 F | WEIGHT: 165.35 LBS | HEIGHT: 63 IN | RESPIRATION RATE: 16 BRPM | OXYGEN SATURATION: 97 %

## 2024-07-21 VITALS
OXYGEN SATURATION: 100 % | SYSTOLIC BLOOD PRESSURE: 125 MMHG | TEMPERATURE: 98 F | RESPIRATION RATE: 18 BRPM | HEART RATE: 73 BPM | DIASTOLIC BLOOD PRESSURE: 70 MMHG

## 2024-07-21 DIAGNOSIS — Z98.890 OTHER SPECIFIED POSTPROCEDURAL STATES: Chronic | ICD-10-CM

## 2024-07-21 LAB
ALBUMIN SERPL ELPH-MCNC: 3.6 G/DL — SIGNIFICANT CHANGE UP (ref 3.5–5)
ALP SERPL-CCNC: 98 U/L — SIGNIFICANT CHANGE UP (ref 40–120)
ALT FLD-CCNC: 40 U/L DA — SIGNIFICANT CHANGE UP (ref 10–60)
ANION GAP SERPL CALC-SCNC: 5 MMOL/L — SIGNIFICANT CHANGE UP (ref 5–17)
AST SERPL-CCNC: 29 U/L — SIGNIFICANT CHANGE UP (ref 10–40)
BASOPHILS # BLD AUTO: 0.02 K/UL — SIGNIFICANT CHANGE UP (ref 0–0.2)
BASOPHILS NFR BLD AUTO: 0.3 % — SIGNIFICANT CHANGE UP (ref 0–2)
BILIRUB SERPL-MCNC: 0.3 MG/DL — SIGNIFICANT CHANGE UP (ref 0.2–1.2)
BUN SERPL-MCNC: 12 MG/DL — SIGNIFICANT CHANGE UP (ref 7–18)
CALCIUM SERPL-MCNC: 9.3 MG/DL — SIGNIFICANT CHANGE UP (ref 8.4–10.5)
CHLORIDE SERPL-SCNC: 110 MMOL/L — HIGH (ref 96–108)
CO2 SERPL-SCNC: 27 MMOL/L — SIGNIFICANT CHANGE UP (ref 22–31)
CREAT SERPL-MCNC: 0.75 MG/DL — SIGNIFICANT CHANGE UP (ref 0.5–1.3)
EGFR: 89 ML/MIN/1.73M2 — SIGNIFICANT CHANGE UP
EOSINOPHIL # BLD AUTO: 0.11 K/UL — SIGNIFICANT CHANGE UP (ref 0–0.5)
EOSINOPHIL NFR BLD AUTO: 1.7 % — SIGNIFICANT CHANGE UP (ref 0–6)
GLUCOSE SERPL-MCNC: 108 MG/DL — HIGH (ref 70–99)
HCT VFR BLD CALC: 39.2 % — SIGNIFICANT CHANGE UP (ref 34.5–45)
HGB BLD-MCNC: 13.2 G/DL — SIGNIFICANT CHANGE UP (ref 11.5–15.5)
IMM GRANULOCYTES NFR BLD AUTO: 0.2 % — SIGNIFICANT CHANGE UP (ref 0–0.9)
LIDOCAIN IGE QN: 39 U/L — SIGNIFICANT CHANGE UP (ref 13–75)
LYMPHOCYTES # BLD AUTO: 2.25 K/UL — SIGNIFICANT CHANGE UP (ref 1–3.3)
LYMPHOCYTES # BLD AUTO: 34.1 % — SIGNIFICANT CHANGE UP (ref 13–44)
MAGNESIUM SERPL-MCNC: 2.1 MG/DL — SIGNIFICANT CHANGE UP (ref 1.6–2.6)
MCHC RBC-ENTMCNC: 32 PG — SIGNIFICANT CHANGE UP (ref 27–34)
MCHC RBC-ENTMCNC: 33.7 GM/DL — SIGNIFICANT CHANGE UP (ref 32–36)
MCV RBC AUTO: 95.1 FL — SIGNIFICANT CHANGE UP (ref 80–100)
MONOCYTES # BLD AUTO: 0.66 K/UL — SIGNIFICANT CHANGE UP (ref 0–0.9)
MONOCYTES NFR BLD AUTO: 10 % — SIGNIFICANT CHANGE UP (ref 2–14)
NEUTROPHILS # BLD AUTO: 3.55 K/UL — SIGNIFICANT CHANGE UP (ref 1.8–7.4)
NEUTROPHILS NFR BLD AUTO: 53.7 % — SIGNIFICANT CHANGE UP (ref 43–77)
NRBC # BLD: 0 /100 WBCS — SIGNIFICANT CHANGE UP (ref 0–0)
NT-PROBNP SERPL-SCNC: 99 PG/ML — SIGNIFICANT CHANGE UP (ref 0–125)
PLATELET # BLD AUTO: 219 K/UL — SIGNIFICANT CHANGE UP (ref 150–400)
POTASSIUM SERPL-MCNC: 3.8 MMOL/L — SIGNIFICANT CHANGE UP (ref 3.5–5.3)
POTASSIUM SERPL-SCNC: 3.8 MMOL/L — SIGNIFICANT CHANGE UP (ref 3.5–5.3)
PROT SERPL-MCNC: 7.3 G/DL — SIGNIFICANT CHANGE UP (ref 6–8.3)
RBC # BLD: 4.12 M/UL — SIGNIFICANT CHANGE UP (ref 3.8–5.2)
RBC # FLD: 12.1 % — SIGNIFICANT CHANGE UP (ref 10.3–14.5)
SODIUM SERPL-SCNC: 142 MMOL/L — SIGNIFICANT CHANGE UP (ref 135–145)
TROPONIN I, HIGH SENSITIVITY RESULT: 22.3 NG/L — SIGNIFICANT CHANGE UP
TROPONIN I, HIGH SENSITIVITY RESULT: 26.6 NG/L — SIGNIFICANT CHANGE UP
WBC # BLD: 6.6 K/UL — SIGNIFICANT CHANGE UP (ref 3.8–10.5)
WBC # FLD AUTO: 6.6 K/UL — SIGNIFICANT CHANGE UP (ref 3.8–10.5)

## 2024-07-21 PROCEDURE — 83735 ASSAY OF MAGNESIUM: CPT

## 2024-07-21 PROCEDURE — 70450 CT HEAD/BRAIN W/O DYE: CPT | Mod: MC

## 2024-07-21 PROCEDURE — 96375 TX/PRO/DX INJ NEW DRUG ADDON: CPT

## 2024-07-21 PROCEDURE — 93005 ELECTROCARDIOGRAM TRACING: CPT

## 2024-07-21 PROCEDURE — 72125 CT NECK SPINE W/O DYE: CPT | Mod: 26,MC

## 2024-07-21 PROCEDURE — 72125 CT NECK SPINE W/O DYE: CPT | Mod: MC

## 2024-07-21 PROCEDURE — 80053 COMPREHEN METABOLIC PANEL: CPT

## 2024-07-21 PROCEDURE — 99285 EMERGENCY DEPT VISIT HI MDM: CPT | Mod: 25

## 2024-07-21 PROCEDURE — 85025 COMPLETE CBC W/AUTO DIFF WBC: CPT

## 2024-07-21 PROCEDURE — 36415 COLL VENOUS BLD VENIPUNCTURE: CPT

## 2024-07-21 PROCEDURE — 71045 X-RAY EXAM CHEST 1 VIEW: CPT

## 2024-07-21 PROCEDURE — 83690 ASSAY OF LIPASE: CPT

## 2024-07-21 PROCEDURE — 93010 ELECTROCARDIOGRAM REPORT: CPT

## 2024-07-21 PROCEDURE — 99285 EMERGENCY DEPT VISIT HI MDM: CPT

## 2024-07-21 PROCEDURE — 83880 ASSAY OF NATRIURETIC PEPTIDE: CPT

## 2024-07-21 PROCEDURE — 71045 X-RAY EXAM CHEST 1 VIEW: CPT | Mod: 26

## 2024-07-21 PROCEDURE — 70450 CT HEAD/BRAIN W/O DYE: CPT | Mod: 26,MC

## 2024-07-21 PROCEDURE — 84484 ASSAY OF TROPONIN QUANT: CPT

## 2024-07-21 PROCEDURE — 96374 THER/PROPH/DIAG INJ IV PUSH: CPT

## 2024-07-21 RX ORDER — ACETAMINOPHEN 325 MG
1000 TABLET ORAL ONCE
Refills: 0 | Status: COMPLETED | OUTPATIENT
Start: 2024-07-21 | End: 2024-07-21

## 2024-07-21 RX ORDER — METOCLOPRAMIDE 5 MG/5ML
10 SOLUTION ORAL ONCE
Refills: 0 | Status: COMPLETED | OUTPATIENT
Start: 2024-07-21 | End: 2024-07-21

## 2024-07-21 RX ADMIN — Medication 400 MILLIGRAM(S): at 13:08

## 2024-07-21 RX ADMIN — METOCLOPRAMIDE 104 MILLIGRAM(S): 5 SOLUTION ORAL at 13:36

## 2024-07-21 RX ADMIN — Medication 1000 MILLIGRAM(S): at 13:38

## 2024-07-21 NOTE — ED ADULT NURSE NOTE - PATIENT'S PREFERRED PRONOUN
Called to double check that mom received the prescription which she confirmed she did.     Visit was completed by a provider earlier today.    Her/She
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
## 2024-07-21 NOTE — ED ADULT NURSE NOTE - OBJECTIVE STATEMENT
Pt presents to the ED c/o left sided chest pain and left sided numbness on and off for 1 month after hernia surgery x 1 month ago as per Pt. Pt presents with left sided facial droop and left sided weakness which Pt states she had for 1 week. Pt denies N/V, dizziness, fevers and SOB. Pt was placed on cardiac monitor, sinus rhythm Pt presents to the ED c/o left sided chest pain and left sided facial numbness on and off for 1 month after hernia surgery x 1 month ago as per Pt. Pt presents with left sided facial droop and left sided weakness which Pt states she had for 1 week. Pt denies N/V, dizziness, fevers and SOB. Pt reports she currently ambulates with cane at home.  Pt was placed on cardiac monitor, sinus rhythm noted, Left AC 18 G IV access was placed and intact. Labs drawn and sent to lab. Pt screened positive for BEFAST upon assessment, MD Guardado was made aware of assessment findings, as per MD Guardado code stroke will not be activated at this time. Pt awaiting CT scan. No distress noted. Pt presents to the ED c/o left sided chest pain which she describes as "pressure feeling" and left sided facial numbness on and off for 1 month after hernia surgery x 1 month ago as per Pt. Pt presents with left sided facial droop and left sided weakness which Pt states she had for 1 week. Pt denies N/V, dizziness, fevers and SOB. Pt reports she currently ambulates with cane at home.  Pt was placed on cardiac monitor, sinus rhythm noted, Left AC 18 G IV access was placed and intact. Labs drawn and sent to lab. EKG performed. Pt screened positive for BEFAST upon assessment, MD Guardado was made aware of assessment findings, as per MD Guardado code stroke will not be activated at this time. Pt awaiting CT scan. No distress noted.

## 2024-07-21 NOTE — ED ADULT NURSE NOTE - NSFALLRISKINTERV_ED_ALL_ED

## 2024-07-21 NOTE — ED PROVIDER NOTE - PROGRESS NOTE DETAILS
Patient reassessed.  Chest pain-free.  States she is feeling "much better".  Discussed results of labs and imaging.  Patient states she has follow-up with outpatient neurologist this week.  Discussed signs and symptoms to return sooner to ED.

## 2024-07-21 NOTE — ED PROVIDER NOTE - CLINICAL SUMMARY MEDICAL DECISION MAKING FREE TEXT BOX
63-year-old female presenting with left-sided chest pain.  Also relates left-sided weakness and numbness and symptoms concerning for stroke.  Symptoms have been present for a week.  Patient is outside the window for tPA.  Patient per EMR patient had similar symptoms after hernia surgery last month and had code stroke called in hospital.  Had later MRI negative for stroke.  Neurology impression was migraine.Will perform EKG labs chest x-ray CT head and reassess.  Anticipate admission.

## 2024-07-21 NOTE — ED ADULT TRIAGE NOTE - PATIENT ON (OXYGEN DELIVERY METHOD)
[facility]        NAME: Carol Davis is a 31 y o  male  : 1996    MRN: [id number]  DATE: 2022  TIME: 10:01 AM    Assessment and Plan   Viral syndrome [B34 9]  1  Viral syndrome  Poct Covid 19 Rapid Antigen Test     Rapid point of care COVID testing negative  Encouraged supportive measures  Patient Instructions     Patient Instructions     Rapid point of care COVID testing negative  Continue with supportive measures, OTC Tylenol/Ibuprofen, cough suppressants, increased fluid intake and rest   Advance diet as tolerated  Follow up with PCP in 3-5 days  Present to ER if symptoms worsen     Viral Syndrome   AMBULATORY CARE:   Viral syndrome  is a term used for symptoms of an infection caused by a virus  Viruses are spread easily from person to person through the air and on shared items  Signs and symptoms  may start slowly or suddenly and last hours to days  They can be mild to severe and can change over days or hours  You may have any of the following:  · Fever and chills    · A runny or stuffy nose    · Cough, sore throat, or hoarseness    · Headache, or pain and pressure around your eyes    · Muscle aches and joint pain    · Shortness of breath or wheezing    · Abdominal pain, cramps, and diarrhea    · Nausea, vomiting, or loss of appetite    Call your local emergency number (911 in the [address] [place] [address],[address]) or have someone else call if:   · You have a seizure  · You cannot be woken  · You have chest pain or trouble breathing  Seek care immediately if:   · You have a stiff neck, a bad headache, and sensitivity to light  · You feel weak, dizzy, or confused  · You stop urinating or urinate a lot less than usual     · You cough up blood or thick yellow or green mucus  · You have severe abdominal pain or your abdomen is larger than usual     Call your doctor if:   · Your symptoms do not get better with treatment or get worse after 3 days  · You have a rash or ear pain      · You have burning when you urinate  · You have questions or concerns about your condition or care  Treatment for viral syndrome  may include medicines to manage your symptoms  Antibiotics are not given for a viral infection  You may  need any of the following:  · Acetaminophen  decreases pain and fever  It is available without a doctor's order  Ask how much to take and how often to take it  Follow directions  Read the labels of all other medicines you are using to see if they also contain acetaminophen, or ask your doctor or pharmacist  Acetaminophen can cause liver damage if not taken correctly  Do not use more than 4 grams (4,000 milligrams) total of acetaminophen in one day  · NSAIDs , such as ibuprofen, help decrease swelling, pain, and fever  NSAIDs can cause stomach bleeding or kidney problems in certain people  If you take blood thinner medicine, always ask your healthcare provider if NSAIDs are safe for you  Always read the medicine label and follow directions  · Cold medicine  helps decrease swelling, control a cough, and relieve chest or nasal congestion  · Saline nasal spray  helps decrease nasal congestion  Manage your symptoms:   · Drink liquids as directed to prevent dehydration  Ask how much liquid to drink each day and which liquids are best for you  Ask if you should drink an oral rehydration solution (ORS)  An ORS has the right amounts of water, salts, and sugar you need to replace body fluids  This may help prevent dehydration caused by vomiting or diarrhea  Do not drink liquids with caffeine  Liquids with caffeine can make dehydration worse  · Get plenty of rest to help your body heal   Take naps throughout the day  Ask your healthcare provider when you can return to work and your normal activities  · Use a cool mist humidifier to help you breathe easier  Ask your healthcare provider how to use a cool mist humidifier  · Eat honey or use cough drops for a sore throat    Cough drops are available without a doctor's order  Follow directions for taking cough drops  · Do not smoke or be close to anyone who is smoking  Nicotine and other chemicals in cigarettes and cigars can cause lung damage  Smoking can also delay healing  Ask your healthcare provider for information if you currently smoke and need help to quit  E-cigarettes or smokeless tobacco still contain nicotine  Talk to your healthcare provider before you use these products  Prevent the spread of germs:       1  Wash your hands often  Wash your hands several times each day  Wash after you use the bathroom, change a child's diaper, and before you prepare or eat food  Use soap and water every time  Rub your soapy hands together, lacing your fingers  Wash the front and back of your hands, and in between your fingers  Use the fingers of one hand to scrub under the fingernails of the other hand  Wash for at least 20 seconds  Rinse with warm, running water for several seconds  Then dry your hands with a clean towel or paper towel  Use hand  that contains alcohol if soap and water are not available  Do not touch your eyes, nose, or mouth without washing your hands first          2  Cover a sneeze or cough  Use a tissue that covers your mouth and nose  Throw the tissue away in a trash can right away  Use the bend of your arm if a tissue is not available  Wash your hands well with soap and water or use a hand   3  Stay away from others while you are sick  Avoid crowds as much as possible  4  Ask about vaccines you may need  Talk to your healthcare provider about your vaccine history  He or she will tell you which vaccines you need, and when to get them  ? Get the influenza (flu) vaccine as soon as recommended each year  The flu vaccine is available starting in September or October  Flu viruses change, so it is important to get a flu vaccine every year  ? Get the pneumonia vaccine if recommended  This vaccine is usually recommended every 5 years  Your provider will tell you when to get this vaccine, if needed  Follow up with your doctor as directed:  Write down your questions so you remember to ask them during your visits  © Copyright [employer] 2022 Information is for End User's use only and may not be sold, redistributed or otherwise used for commercial purposes  All illustrations and images included in CareNotes® are the copyrighted property of A D A M , Inc  or [facility] Leonardo Arnold   The above information is an  only  It is not intended as medical advice for individual conditions or treatments  Talk to your doctor, nurse or pharmacist before following any medical regimen to see if it is safe and effective for you  Chief Complaint     Chief Complaint   Patient presents with    Cough     Patient relates cold sweats, headaches, nauseated for past weeks  History of Present Illness       62-year-old male presents with c/o chills, suspected fevers, vomiting, cough, and headache since Monday  He reports multiple sick contacts/exposures at work  He is eating and drinking well, able to tolerate some PO intake  Last episode of emesis yesterday  He further denies any nasal congestion, chest pain, shortness of breath, or diarrhea  He is not vaccinated for COVID  Review of Systems   Review of Systems   Constitutional: Positive for chills and diaphoresis  HENT: Negative for congestion, ear pain, postnasal drip, rhinorrhea and sore throat  Respiratory: Positive for cough  Negative for shortness of breath  Cardiovascular: Negative for chest pain  Gastrointestinal: Positive for nausea and vomiting  Negative for abdominal pain and diarrhea  Musculoskeletal: Negative for myalgias  Neurological: Positive for headaches           Current Medications       Current Outpatient Medications:     doxepin (SINEquan) 10 mg capsule, Take 1 capsule (10 mg total) by mouth daily at bedtime, Disp: 30 capsule, Rfl: 2    Current Allergies     Allergies as of 10/07/2022    (No Known Allergies)            The following portions of the patient's history were reviewed and updated as appropriate: allergies, current medications, past family history, past medical history, past social history, past surgical history and problem list      Past Medical History:   Diagnosis Date    ADHD     Allergic     Anxiety     Asthma     Depression     Dysuria     Last assessed - 1/24/14    Suicide attempt [facility])        History reviewed  No pertinent surgical history  Family History   Problem Relation Age of Onset    RACHID disease Mother         Reflux    Other Mother         Rhinitis    No Known Problems Father     Allergies Cousin         Allergic Disorder    Asthma Cousin          Medications have been verified  Objective   /72   Pulse 88   Temp (!) 97 2 °F (36 2 °C)   Resp 16   SpO2 99%   No LMP for male patient  Physical Exam     Physical Exam  Vitals and nursing note reviewed  Constitutional:       General: He is not in acute distress  Appearance: He is not toxic-appearing  HENT:      Head: Normocephalic  Right Ear: Tympanic membrane, ear canal and external ear normal       Left Ear: Tympanic membrane, ear canal and external ear normal       Nose: Nose normal       Mouth/Throat:      Mouth: Mucous membranes are moist       Pharynx: Oropharynx is clear  No posterior oropharyngeal erythema  Tonsils: No tonsillar exudate  Eyes:      Conjunctiva/sclera: Conjunctivae normal    Cardiovascular:      Rate and Rhythm: Normal rate and regular rhythm  Heart sounds: Normal heart sounds  Pulmonary:      Effort: Pulmonary effort is normal  No respiratory distress  Breath sounds: Normal breath sounds  Abdominal:      General: Bowel sounds are normal       Palpations: Abdomen is soft  Tenderness: There is no abdominal tenderness     Skin: General: Skin is warm and dry  Neurological:      Mental Status: He is alert and oriented to person, place, and time     Psychiatric:         Mood and Affect: Mood normal          Behavior: Behavior normal  room air

## 2024-07-21 NOTE — ED PROVIDER NOTE - NSFOLLOWUPINSTRUCTIONS_ED_ALL_ED_FT
Chest Pain    WHAT YOU NEED TO KNOW:    Chest pain can be caused by a range of conditions, from not serious to life-threatening. Chest pain can be a symptom of a digestive problem, such as acid reflux or a stomach ulcer. An anxiety attack or a strong emotion, such as anger, can also cause chest pain. Infection, inflammation, or a fracture in the bones or cartilage in your chest can cause pain or discomfort. Sometimes chest pain or pressure is caused by poor blood flow to your heart (angina). Chest pain may also be caused by life-threatening conditions such as a heart attack or blood clot in your lungs.    DISCHARGE INSTRUCTIONS:    Call your local emergency number (911 in the [place]) or have someone call if:    You have any of the following signs of a heart attack:  Squeezing, pressure, or pain in your chest    You may also have any of the following:  Discomfort or pain in your back, neck, jaw, stomach, or arm    Shortness of breath    Nausea or vomiting    Lightheadedness or a sudden cold sweat    Seek care immediately if:    You have chest discomfort that gets worse, even with medicine.    You cough or vomit blood.    Your bowel movements are black or bloody.    You cannot stop vomiting, or it hurts to swallow.  Call your doctor if:    You have questions or concerns about your condition or care.    Medicines:    Medicines may be given to treat the cause of your chest pain. Examples include pain medicine, anxiety medicine, or medicines to increase blood flow to your heart.    Do not take certain medicines without asking your healthcare provider first. These include NSAIDs, herbal or vitamin supplements, and hormones, such as estrogen or progestin.    Take your medicine as directed. Contact your healthcare provider if you think your medicine is not helping or if you have side effects. Tell your provider if you are allergic to any medicine. Keep a list of the medicines, vitamins, and herbs you take. Include the amounts, and when and why you take them. Bring the list or the pill bottles to follow-up visits. Carry your medicine list with you in case of an emergency.  Healthy living tips: If the cause of your chest pain is known, your healthcare provider will give you specific guidelines to follow. The following are general healthy guidelines:    Do not smoke. Nicotine and other chemicals in cigarettes and cigars can cause lung and heart damage. Ask your healthcare provider for information if you currently smoke and need help to quit. E-cigarettes or smokeless tobacco still contain nicotine. Talk to your healthcare provider before you use these products.    Choose a variety of healthy foods as often as possible. Include fresh, frozen, or canned fruits and vegetables. Also include low-fat dairy products, fish, chicken (without skin), and lean meats. Your healthcare provider or a dietitian can help you create meal plans. You may need to avoid certain foods or drinks if your pain is caused by a digestion problem.  Healthy Foods      Lower your sodium (salt) intake. Limit foods that are high in sodium, such as canned foods, salty snacks, and cold cuts. If you add salt when you cook food, do not add more at the table. Choose low-sodium canned foods as much as possible.        Drink plenty of water every day. Water helps your body to control temperature and blood pressure. Ask your healthcare provider how much water you should drink every day.    Ask about activity. Your healthcare provider will tell you which activities to limit or avoid. Ask when you can drive, return to work, and have sex. Ask about the best exercise plan for you.    Maintain a healthy weight. Ask your healthcare provider what a healthy weight is for you. Ask him or her to help you create a safe weight loss plan if you are overweight.    Ask about vaccines you may need. Your healthcare provider can tell you which vaccines you need, and when to get them. The following vaccines help prevent diseases that can become serious for a person with a heart condition:  The influenza (flu) vaccine is given each year. Get a flu vaccine as soon as recommended, usually in September or October.    The pneumonia vaccine is usually given every 5 years. Your healthcare provider may recommend the pneumonia vaccine if you are 65 or older.    COVID-19 vaccines are given to adults as a shot. At least 1 dose of an updated vaccine is recommended for all adults. COVID-19 vaccines are updated throughout the year. Adults 65 or older need a second dose of updated vaccine at least 4 months after the first dose. Your healthcare provider can help you schedule all needed doses as updated vaccines become available.  Prevent Heart Disease   Follow up with your doctor within 72 hours, or as directed: You may need to return for more tests to find the cause of your chest pain. You may be referred to a specialist, such as a cardiologist or gastroenterologist. Write down your questions so you remember to ask them during your visits.    © [employer] 1973, 2024    	  back to top   	Adds current document to the Print List.	Customize	Print documents

## 2024-07-21 NOTE — ED PROVIDER NOTE - PATIENT PORTAL LINK FT
You can access the FollowMyHealth Patient Portal offered by [facility] by registering at the following website: [email or web address]. By joining [employer]’s FollowMyHealth portal, you will also be able to view your health information using other applications (apps) compatible with our system.

## 2024-07-21 NOTE — ED PROVIDER NOTE - OBJECTIVE STATEMENT
63-year-old female history of HTN, HLD, asthma, anemia presenting with dull left-sided chest pain radiating for 1 week.  No alleviating or worsening symptoms.  Also relates having intermittent tingling and weakness to left side of her body for a week.  States that she was told by outpatient neurologist that she may have had a TIA.  Patient states she had similar symptoms while admitted for hernia surgery last month and was told that she did not have a stroke.  Denies any associated shortness of breath, dizziness or nausea.  Denies any fever, chills or cough.  Denies any history of CAD.  No history of cardiac workup in the past.  No travel or sick contacts.

## 2024-07-21 NOTE — ED ADULT NURSE REASSESSMENT NOTE - NS ED NURSE REASSESS COMMENT FT1
Pt reports she feels "a lot better" in chest pain, left sided weakness and facial numbness upon reassessment. Pt denies any pain/discomfort. NIHSS score went from 2 to 1 upon reassessment.  MD Guardado made aware. No distress noted. Pt to be DC as per MD Guardado. Pt reports she feels "a lot better" in chest pain, Pt reports improvement in left sided weakness and facial numbness upon reassessment. Pt denies any pain/discomfort. NIHSS score went from 2 to 1 upon reassessment.  MD Guardado made aware. No distress noted. Pt to be DC as per MD Guardado.

## 2024-07-23 ENCOUNTER — NON-APPOINTMENT (OUTPATIENT)
Age: 64
End: 2024-07-23

## 2024-08-13 ENCOUNTER — EMERGENCY (EMERGENCY)
Facility: HOSPITAL | Age: 64
LOS: 1 days | Discharge: HOME | End: 2024-08-13
Attending: STUDENT IN AN ORGANIZED HEALTH CARE EDUCATION/TRAINING PROGRAM
Payer: MEDICAID

## 2024-08-13 VITALS
DIASTOLIC BLOOD PRESSURE: 62 MMHG | TEMPERATURE: 98 F | RESPIRATION RATE: 17 BRPM | HEART RATE: 75 BPM | OXYGEN SATURATION: 94 % | SYSTOLIC BLOOD PRESSURE: 100 MMHG

## 2024-08-13 VITALS
OXYGEN SATURATION: 97 % | HEART RATE: 67 BPM | WEIGHT: 162.92 LBS | SYSTOLIC BLOOD PRESSURE: 76 MMHG | RESPIRATION RATE: 17 BRPM | HEIGHT: 63 IN | DIASTOLIC BLOOD PRESSURE: 51 MMHG | TEMPERATURE: 97 F

## 2024-08-13 DIAGNOSIS — Z98.890 OTHER SPECIFIED POSTPROCEDURAL STATES: Chronic | ICD-10-CM

## 2024-08-13 LAB
ANION GAP SERPL CALC-SCNC: 10 MMOL/L — SIGNIFICANT CHANGE UP (ref 5–17)
ANION GAP SERPL CALC-SCNC: 7 MMOL/L — SIGNIFICANT CHANGE UP (ref 5–17)
BASOPHILS # BLD AUTO: 0.02 K/UL — SIGNIFICANT CHANGE UP (ref 0–0.2)
BASOPHILS NFR BLD AUTO: 0.3 % — SIGNIFICANT CHANGE UP (ref 0–2)
BUN SERPL-MCNC: 13 MG/DL — SIGNIFICANT CHANGE UP (ref 7–18)
BUN SERPL-MCNC: 14 MG/DL — SIGNIFICANT CHANGE UP (ref 7–18)
CALCIUM SERPL-MCNC: 8.1 MG/DL — LOW (ref 8.4–10.5)
CALCIUM SERPL-MCNC: 9.1 MG/DL — SIGNIFICANT CHANGE UP (ref 8.4–10.5)
CHLORIDE SERPL-SCNC: 105 MMOL/L — SIGNIFICANT CHANGE UP (ref 96–108)
CHLORIDE SERPL-SCNC: 109 MMOL/L — HIGH (ref 96–108)
CO2 SERPL-SCNC: 23 MMOL/L — SIGNIFICANT CHANGE UP (ref 22–31)
CO2 SERPL-SCNC: 24 MMOL/L — SIGNIFICANT CHANGE UP (ref 22–31)
CREAT SERPL-MCNC: 1.08 MG/DL — SIGNIFICANT CHANGE UP (ref 0.5–1.3)
CREAT SERPL-MCNC: 1.4 MG/DL — HIGH (ref 0.5–1.3)
EGFR: 42 ML/MIN/1.73M2 — LOW
EGFR: 57 ML/MIN/1.73M2 — LOW
EOSINOPHIL # BLD AUTO: 0 K/UL — SIGNIFICANT CHANGE UP (ref 0–0.5)
EOSINOPHIL NFR BLD AUTO: 0 % — SIGNIFICANT CHANGE UP (ref 0–6)
FLUAV AG NPH QL: DETECTED
FLUBV AG NPH QL: SIGNIFICANT CHANGE UP
GLUCOSE SERPL-MCNC: 116 MG/DL — HIGH (ref 70–99)
GLUCOSE SERPL-MCNC: 95 MG/DL — SIGNIFICANT CHANGE UP (ref 70–99)
HCT VFR BLD CALC: 41.4 % — SIGNIFICANT CHANGE UP (ref 34.5–45)
HGB BLD-MCNC: 13.7 G/DL — SIGNIFICANT CHANGE UP (ref 11.5–15.5)
IMM GRANULOCYTES NFR BLD AUTO: 0.3 % — SIGNIFICANT CHANGE UP (ref 0–0.9)
LIDOCAIN IGE QN: 126 U/L — HIGH (ref 13–75)
LYMPHOCYTES # BLD AUTO: 1.69 K/UL — SIGNIFICANT CHANGE UP (ref 1–3.3)
LYMPHOCYTES # BLD AUTO: 27 % — SIGNIFICANT CHANGE UP (ref 13–44)
MAGNESIUM SERPL-MCNC: 2.1 MG/DL — SIGNIFICANT CHANGE UP (ref 1.6–2.6)
MCHC RBC-ENTMCNC: 31.9 PG — SIGNIFICANT CHANGE UP (ref 27–34)
MCHC RBC-ENTMCNC: 33.1 GM/DL — SIGNIFICANT CHANGE UP (ref 32–36)
MCV RBC AUTO: 96.5 FL — SIGNIFICANT CHANGE UP (ref 80–100)
MONOCYTES # BLD AUTO: 0.62 K/UL — SIGNIFICANT CHANGE UP (ref 0–0.9)
MONOCYTES NFR BLD AUTO: 9.9 % — SIGNIFICANT CHANGE UP (ref 2–14)
NEUTROPHILS # BLD AUTO: 3.92 K/UL — SIGNIFICANT CHANGE UP (ref 1.8–7.4)
NEUTROPHILS NFR BLD AUTO: 62.5 % — SIGNIFICANT CHANGE UP (ref 43–77)
NRBC # BLD: 0 /100 WBCS — SIGNIFICANT CHANGE UP (ref 0–0)
NT-PROBNP SERPL-SCNC: 98 PG/ML — SIGNIFICANT CHANGE UP (ref 0–125)
PLATELET # BLD AUTO: 175 K/UL — SIGNIFICANT CHANGE UP (ref 150–400)
POTASSIUM SERPL-MCNC: 3.3 MMOL/L — LOW (ref 3.5–5.3)
POTASSIUM SERPL-MCNC: 3.6 MMOL/L — SIGNIFICANT CHANGE UP (ref 3.5–5.3)
POTASSIUM SERPL-SCNC: 3.3 MMOL/L — LOW (ref 3.5–5.3)
POTASSIUM SERPL-SCNC: 3.6 MMOL/L — SIGNIFICANT CHANGE UP (ref 3.5–5.3)
RBC # BLD: 4.29 M/UL — SIGNIFICANT CHANGE UP (ref 3.8–5.2)
RBC # FLD: 12.6 % — SIGNIFICANT CHANGE UP (ref 10.3–14.5)
SARS-COV-2 RNA SPEC QL NAA+PROBE: SIGNIFICANT CHANGE UP
SODIUM SERPL-SCNC: 138 MMOL/L — SIGNIFICANT CHANGE UP (ref 135–145)
SODIUM SERPL-SCNC: 140 MMOL/L — SIGNIFICANT CHANGE UP (ref 135–145)
TROPONIN I, HIGH SENSITIVITY RESULT: 53.1 NG/L — SIGNIFICANT CHANGE UP
WBC # BLD: 6.27 K/UL — SIGNIFICANT CHANGE UP (ref 3.8–10.5)
WBC # FLD AUTO: 6.27 K/UL — SIGNIFICANT CHANGE UP (ref 3.8–10.5)

## 2024-08-13 PROCEDURE — 36415 COLL VENOUS BLD VENIPUNCTURE: CPT

## 2024-08-13 PROCEDURE — 84484 ASSAY OF TROPONIN QUANT: CPT

## 2024-08-13 PROCEDURE — 71045 X-RAY EXAM CHEST 1 VIEW: CPT

## 2024-08-13 PROCEDURE — 80048 BASIC METABOLIC PNL TOTAL CA: CPT

## 2024-08-13 PROCEDURE — 83735 ASSAY OF MAGNESIUM: CPT

## 2024-08-13 PROCEDURE — 99285 EMERGENCY DEPT VISIT HI MDM: CPT | Mod: 25

## 2024-08-13 PROCEDURE — 99285 EMERGENCY DEPT VISIT HI MDM: CPT

## 2024-08-13 PROCEDURE — 93005 ELECTROCARDIOGRAM TRACING: CPT

## 2024-08-13 PROCEDURE — 96374 THER/PROPH/DIAG INJ IV PUSH: CPT

## 2024-08-13 PROCEDURE — 85025 COMPLETE CBC W/AUTO DIFF WBC: CPT

## 2024-08-13 PROCEDURE — 96375 TX/PRO/DX INJ NEW DRUG ADDON: CPT

## 2024-08-13 PROCEDURE — 87636 SARSCOV2 & INF A&B AMP PRB: CPT

## 2024-08-13 PROCEDURE — 83880 ASSAY OF NATRIURETIC PEPTIDE: CPT

## 2024-08-13 PROCEDURE — 83690 ASSAY OF LIPASE: CPT

## 2024-08-13 PROCEDURE — 71045 X-RAY EXAM CHEST 1 VIEW: CPT | Mod: 26

## 2024-08-13 RX ORDER — POTASSIUM CHLORIDE 1500 MG/1
20 TABLET, EXTENDED RELEASE ORAL ONCE
Refills: 0 | Status: COMPLETED | OUTPATIENT
Start: 2024-08-13 | End: 2024-08-13

## 2024-08-13 RX ORDER — ACETAMINOPHEN 500 MG
1000 TABLET ORAL ONCE
Refills: 0 | Status: COMPLETED | OUTPATIENT
Start: 2024-08-13 | End: 2024-08-13

## 2024-08-13 RX ORDER — MAGNESIUM, ALUMINUM HYDROXIDE 200-225/5
30 SUSPENSION, ORAL (FINAL DOSE FORM) ORAL ONCE
Refills: 0 | Status: COMPLETED | OUTPATIENT
Start: 2024-08-13 | End: 2024-08-13

## 2024-08-13 RX ORDER — MECLIZINE 12.5 MG/1
25 TABLET ORAL ONCE
Refills: 0 | Status: COMPLETED | OUTPATIENT
Start: 2024-08-13 | End: 2024-08-13

## 2024-08-13 RX ORDER — IBUPROFEN 200 MG
1 TABLET ORAL
Qty: 90 | Refills: 0
Start: 2024-08-13 | End: 2024-09-11

## 2024-08-13 RX ORDER — METOCLOPRAMIDE HCL 5 MG/ML
10 VIAL (ML) INJECTION ONCE
Refills: 0 | Status: COMPLETED | OUTPATIENT
Start: 2024-08-13 | End: 2024-08-13

## 2024-08-13 RX ORDER — ASPIRIN 500 MG
162 TABLET ORAL ONCE
Refills: 0 | Status: COMPLETED | OUTPATIENT
Start: 2024-08-13 | End: 2024-08-13

## 2024-08-13 RX ORDER — BACTERIOSTATIC SODIUM CHLORIDE 0.9 %
1000 VIAL (ML) INJECTION
Refills: 0 | Status: ACTIVE | OUTPATIENT
Start: 2024-08-13 | End: 2025-07-12

## 2024-08-13 RX ORDER — ACETAMINOPHEN 500 MG
2 TABLET ORAL
Qty: 112 | Refills: 0
Start: 2024-08-13 | End: 2024-08-26

## 2024-08-13 RX ADMIN — POTASSIUM CHLORIDE 20 MILLIEQUIVALENT(S): 1500 TABLET, EXTENDED RELEASE ORAL at 12:06

## 2024-08-13 RX ADMIN — Medication 30 MILLILITER(S): at 10:11

## 2024-08-13 RX ADMIN — Medication 400 MILLIGRAM(S): at 10:11

## 2024-08-13 RX ADMIN — Medication 162 MILLIGRAM(S): at 10:12

## 2024-08-13 RX ADMIN — Medication 10 MILLIGRAM(S): at 13:21

## 2024-08-13 RX ADMIN — Medication 250 MILLILITER(S): at 10:12

## 2024-08-13 RX ADMIN — MECLIZINE 25 MILLIGRAM(S): 12.5 TABLET ORAL at 10:12

## 2024-08-13 RX ADMIN — Medication 1000 MILLIGRAM(S): at 10:40

## 2024-08-13 NOTE — ED ADULT NURSE NOTE - OBJECTIVE STATEMENT
Pt AOx4, ambulatory, c/o left chest pain and cough x a few days. Dizziness today. EKG done, pt placed on cardiac monitor, no signs of distress noted.

## 2024-08-13 NOTE — ED ADULT NURSE NOTE - ED COMFORT CARE
INTERVAL HISTORY: pt is lying in bed comfortable, not in distress, complains of right ankle pain  	  MEDICATIONS:  enoxaparin Injectable 40 milliGRAM(s) SubCutaneous every 24 hours  prazosin 1 milliGRAM(s) Oral at bedtime  furosemide    Tablet 20 milliGRAM(s) Oral daily    cephalexin 500 milliGRAM(s) Oral four times a day    ALBUTerol/ipratropium for Nebulization 3 milliLiter(s) Nebulizer every 6 hours PRN    QUEtiapine 400 milliGRAM(s) Oral at bedtime  topiramate 100 milliGRAM(s) Oral daily  acetaminophen   Tablet 650 milliGRAM(s) Oral every 6 hours PRN  naproxen 500 milliGRAM(s) Oral two times a day  gabapentin 800 milliGRAM(s) Oral three times a day  escitalopram 20 milliGRAM(s) Oral at bedtime  traZODone 300 milliGRAM(s) Oral at bedtime  buprenorphine 8 mG/naloxone 2 mG SL  Tablet 4 Tablet(s) SubLingual daily    pantoprazole    Tablet 40 milliGRAM(s) Oral before breakfast  senna 2 Tablet(s) Oral at bedtime  docusate sodium 100 milliGRAM(s) Oral two times a day      multivitamin 1 Tablet(s) Oral daily  cholecalciferol 2000 Unit(s) Oral daily  ferrous    sulfate Liquid 300 milliGRAM(s) Oral three times a day with meals      PHYSICAL EXAM:  T(C): 36.4 (07-31-17 @ 06:01), Max: 36.4 (07-30-17 @ 21:22)  HR: 71 (07-31-17 @ 06:01) (71 - 85)  BP: 128/75 (07-31-17 @ 06:01) (109/76 - 128/75)  RR: 18 (07-31-17 @ 06:01) (18 - 18)  SpO2: 96% (07-31-17 @ 06:01) (96% - 98%)  Wt(kg): --  I&O's Summary        Appearance: Normal	  HEENT:   Normal oral mucosa, PERRL, EOMI	  Cardiovascular: Normal S1 S2, No JVD, No murmurs, No edema  Respiratory: Lungs clear to auscultation	  Gastrointestinal:  Soft, Non-tender, + BS	  Extremities: 2+ edema, right leg erythema and warmth                                    11.2   4.24  )-----------( 203      ( 30 Jul 2017 06:57 )             34.6     07-30    143  |  103  |  17  ----------------------------<  111<H>  3.8   |  29  |  0.76    Ca    8.8      30 Jul 2017 06:57      proBNP:   Lipid Profile:   HgA1c:   TSH: Patient informed

## 2024-08-13 NOTE — ED PROVIDER NOTE - NSFOLLOWUPINSTRUCTIONS_ED_ALL_ED_FT
Influenza (flu) is an infection in the lungs and breathing passages. It is caused by the influenza virus. There are different strains, or types, of the flu virus from year to year. Unlike the common cold, the flu comes on suddenly and the symptoms can be more severe. These symptoms include a cough, congestion, fever, chills, fatigue, aches, and pains. These symptoms may last for a few weeks. Although the flu can make you feel very sick, it usually doesn't cause serious health problems.    Home treatment is usually all you need for flu symptoms. But your doctor may prescribe antiviral medicine to prevent other health problems, such as pneumonia, from developing. The risk of other health problems from the flu is highest for young children (under 5), older adults (over 65), pregnant women, people with long-term health conditions, people who live in nursing homes or long-term care centres, and indigenous peoples.

## 2024-08-13 NOTE — ED PROVIDER NOTE - NSICDXPASTMEDICALHX_GEN_ALL_CORE_FT
PAST MEDICAL HISTORY:  Anemia     Asthma     GERD (gastroesophageal reflux disease)     History of varicose veins of lower extremity     HLD (hyperlipidemia)     HTN (hypertension)     Lumbar disc herniation     Prediabetes     Seasonal allergies     Vitamin D deficiency

## 2024-08-13 NOTE — ED PROVIDER NOTE - OBJECTIVE STATEMENT
63 F presents for ED for chest pain, dizziness and dry cough. 63 female presents for multiple complaints.  Patient reports dry cough dizziness body aches and congestion for 3 days.  Patient reports that she has been home and symptoms progressively worsened.  Today reports she felt dizzy and weak so she decided to come to the ED.  On the rating room patient felt severely dizzy was a rapid response brought into the ED and evaluated immediately.  Appear to be in mild distress reporting multiple complaints.  Frequently coughing during history.  Denies any headache neck pain shortness of breath nausea vomiting or diarrhea.  Endorses chest pain specifically while coughing.    GENERAL APPEARANCE:  AAOx3, generally well-appearing, no acute distress. Appears stated age.  HEENT:  NCAT. Moist mucous membranes. EOMI, clear conjunctiva, oropharynx clear.  NECK:  Supple without lymphadenopathy. No JVD.  No neck stiffness or restricted ROM.  HEART:  Normal heart rate and regular rhythm. No murmur.   LUNGS:  CTAB, moving air well. No crackles or wheezes are heard.  ABDOMEN:  Soft, nontender, non-distended. Negative David. Negative McBurney. No rebound or guarding.  CHEST/BACK: Chest wall non-tender. No CVAT, or midline cervical/thoracic/lumbar tenderness to palpation. No obvious deformity of chest wall or back.  EXTREMITIES:  Without cyanosis, clubbing or edema. Pulse intact x 4. FROM x4. Compartments soft in all extremities.  NEUROLOGICAL:  Grossly non-focal. Alert and oriented, moving all 4 extremities. CN II-XII grossly intact. Observed to ambulate with normal gait.  SKIN:  Warm and dry without any rash.  PSYCH: Calm, cooperative. Normal mood and affect. Demonstrates proper insight and judgement.

## 2024-08-13 NOTE — ED PROVIDER NOTE - CLINICAL SUMMARY MEDICAL DECISION MAKING FREE TEXT BOX
After introducing myself to the patient and/or family I have performed a medical history, review of systems, and physical examination. I have formulated a differential diagnosis and plan of care for this visit and discussed this with the patient and/or family. I have also addressed the risks and benefits of diagnostic and treatment modalities planned for this visit.    Vital Signs: Reviewed the patient's available previous ED visit vitals and current vital signs.  Nursing Notes: Reviewed and utilized available ED nursing notes.  Old Medical Records: The patient's available past medical records and past encounters were reviewed.  Laboratory Studies: Ordered and independently interpreted laboratory tests.  Imaging Studies: Imaging studies ordered. I have independently reviewed imaging.    Patient [hemodynamically stable in no acute distress.]    Based on my examination of the patient and the patient's reported complaint an extensive evaluation was performed to identify emergent medical and surgical diagnoses that could lead to significant morbidity, loss of bodily function or even mortality to the patient.     + fever, cough, sore throat, malaise consistent with viral illness such as Influenza.  Patient Not pregnant, from SNF, chronically ill or immunosuppressed.    Given History and Exam I have a lower suspicion for:  Emergent CardioPulmonary causes [such as Acute Asthma or COPD Exacerbation, acute Heart Failure or exacerbation, PE, PTX, atypical ACS, PNA].  Emergent Otolaryngeal causes [such as PTA, RPA, Ludwigs, Epiglottitis, EBV].  Emergent Travel or Immunosuppressive related infectious causes[such as acute HIV, SARS, MERS].    Multiple different etiologies for the patient's presenting symptoms were reviewed both emergent and  nonemergent discussed with the patient.  At this time doubt cardiovascular, intra-abdominal,  pulmonary or infectious emergency process did discuss with patient that this could be early in the  presentation of an acute process and if symptoms worsen change or develop they should immediately  seek reassessment.  In my opinion this patient is safe and stable for discharge.  I see no evidence, at this moment in time, of any acute emergency process which requires further evaluation or  management in the Emergency Department or the hospital.  I have discussed my medical impression and thinking in detail as well as return precautions, and in lay-persons terms, with the patient and/or  their loved ones.  Patient verbalized understanding and agreement with care plan and return  precautions.        The patient was advised to follow up with us or PCP as needed.  It was emphasized that this was  their responsibility, and that emergency department care is only one aspect to their overall  healthcare.  The patient was advised that not all medical problems present themselves initially; and this could be a process that is early in its evolution so the patient needs to pay close attention  to their symptoms and condition, and seek medical care immediately or return to the emergency department for any worsening/persisting symptoms, changes, or concerns, or if they feel they are not safe in any way at any time.      This document has been prepared with voice recognition software and was dictated in the potentially  noisy environment of the emergency department.  Please excuse unusual errors, mistypes, or voice  recognition mistakes which may occur.

## 2024-08-13 NOTE — ED PROVIDER NOTE - PATIENT PORTAL LINK FT
You can access the FollowMyHealth Patient Portal offered by [facility] by registering at the following website: [email or web address]. By joining [employer]’s FollowMyHealth portal, you will also be able to view your health information using other applications (apps) compatible with our system.

## 2024-08-13 NOTE — ED ADULT NURSE NOTE - NSFALLUNIVINTERV_ED_ALL_ED
Bed/Stretcher in lowest position, wheels locked, appropriate side rails in place/Call bell, personal items and telephone in reach/Instruct patient to call for assistance before getting out of bed/chair/stretcher/Non-slip footwear applied when patient is off stretcher/[place] to call system/Physically safe environment - no spills, clutter or unnecessary equipment/Purposeful proactive rounding/Room/bathroom lighting operational, light cord in reach

## 2024-08-26 ENCOUNTER — NON-APPOINTMENT (OUTPATIENT)
Age: 64
End: 2024-08-26

## 2024-08-27 ENCOUNTER — APPOINTMENT (OUTPATIENT)
Dept: NEUROLOGY | Facility: CLINIC | Age: 64
End: 2024-08-27

## 2024-08-27 VITALS
HEIGHT: 63 IN | HEART RATE: 68 BPM | WEIGHT: 160 LBS | OXYGEN SATURATION: 96 % | SYSTOLIC BLOOD PRESSURE: 152 MMHG | BODY MASS INDEX: 28.35 KG/M2 | TEMPERATURE: 98.2 F | DIASTOLIC BLOOD PRESSURE: 74 MMHG

## 2024-08-27 DIAGNOSIS — R20.0 ANESTHESIA OF SKIN: ICD-10-CM

## 2024-08-27 DIAGNOSIS — R29.810 FACIAL WEAKNESS: ICD-10-CM

## 2024-08-27 DIAGNOSIS — Z09 ENCOUNTER FOR FOLLOW-UP EXAMINATION AFTER COMPLETED TREATMENT FOR CONDITIONS OTHER THAN MALIGNANT NEOPLASM: ICD-10-CM

## 2024-08-27 DIAGNOSIS — G93.9 DISORDER OF BRAIN, UNSPECIFIED: ICD-10-CM

## 2024-08-27 DIAGNOSIS — G37.3 ACUTE TRANSVERSE MYELITIS IN DEMYELINATING DISEASE OF CENTRAL NERVOUS SYSTEM: ICD-10-CM

## 2024-08-27 DIAGNOSIS — R20.2 ANESTHESIA OF SKIN: ICD-10-CM

## 2024-08-27 DIAGNOSIS — I69.054: ICD-10-CM

## 2024-08-27 DIAGNOSIS — G04.81 OTHER ENCEPHALITIS AND ENCEPHALOMYELITIS: ICD-10-CM

## 2024-08-27 DIAGNOSIS — G12.20 MOTOR NEURON DISEASE, UNSPECIFIED: ICD-10-CM

## 2024-08-27 PROCEDURE — 99214 OFFICE O/P EST MOD 30 MIN: CPT

## 2024-08-28 ENCOUNTER — LABORATORY RESULT (OUTPATIENT)
Age: 64
End: 2024-08-28

## 2024-08-28 NOTE — PHYSICAL EXAM
[Person] : oriented to person [Place] : oriented to place [Time] : oriented to time [Short Term Intact] : short term memory intact [Remote Intact] : remote memory intact [Registration Intact] : recent registration memory intact [Span Intact] : the attention span was normal [Concentration Intact] : normal concentrating ability [Visual Intact] : visual attention was ~T not ~L decreased [Naming Objects] : no difficulty naming common objects [Repeating Phrases] : no difficulty repeating a phrase [Writing A Sentence] : no difficulty writing a sentence [Fluency] : fluency intact [Reading] : reading intact [Past History] : adequate knowledge of personal past history [Vocabulary] : adequate range of vocabulary [Cranial Nerves Optic (II)] : visual acuity intact bilaterally,  visual fields full to confrontation, pupils equal round and reactive to light [Cranial Nerves Oculomotor (III)] : extraocular motion intact [Cranial Nerves Facial Central - Right Only] : central 7th nerve weakness [Flattening Of Nasolabial Fold On The Right] : flattening of the nasolabial fold [Mouth Droop On The Right] : right-sided mouth droop [Cranial Nerves Right Facial: House Grade ___(1-6)] : grade III (moderate, 60%) facial nerve function [Paresis Pronator Drift Left-Sided] : a left-sided pronator drift was present [Motor Strength Upper Extremities Left] : there was weakness of the left upper extremity [Motor Strength Lower Extremities Left] : there was weakness of the left lower extremity [Tactile Decrease Hemisensory Entire Left Side] : diminished left side [Tactile Decrease Hemisensory Left Side, Sparing Left Face] : diminished left side (sparing the face) [Pain / Temp Decrease Hemisensory Entire Left Side] : diminished left side [Pain / Temp Decrease Hemisensory L. Side, Sparing Left Face] : diminished left side (sparing the face) [Sim - Left Side] : decreased on the left side [2+] : Ankle jerk right 2+ [3+] : Ankle jerk left 3+ [Plantar Reflex Right Only] : abnormal on the right [Plantar Reflex Left Only] : abnormal on the left [Sclera] : the sclera and conjunctiva were normal [PERRL With Normal Accommodation] : pupils were equal in size, round, reactive to light, with normal accommodation [Extraocular Movements] : extraocular movements were intact [Outer Ear] : the ears and nose were normal in appearance [Oropharynx] : the oropharynx was normal [Neck Appearance] : the appearance of the neck was normal [Neck Cervical Mass (___cm)] : no neck mass was observed [Jugular Venous Distention Increased] : there was no jugular-venous distention [Thyroid Diffuse Enlargement] : the thyroid was not enlarged [Thyroid Nodule] : there were no palpable thyroid nodules [] : no respiratory distress [Auscultation Breath Sounds / Voice Sounds] : lungs were clear to auscultation bilaterally [Heart Rate And Rhythm] : heart rate was normal and rhythm regular [Heart Sounds] : normal S1 and S2 [Heart Sounds Gallop] : no gallops [Murmurs] : no murmurs [Heart Sounds Pericardial Friction Rub] : no pericardial rub [Full Pulse] : the pedal pulses are present [Edema] : there was no peripheral edema [FreeTextEntry6] : Increased tone on the left

## 2024-08-28 NOTE — DATA REVIEWED
[de-identified] : ACC: [id number] EXAM: MR BRAIN ORDERED BY: ESTEPHANIA VELEZ DATE: 06/13/2024    INTERPRETATION: .  CLINICAL INFORMATION: Code stroke. Left-sided weakness.  TECHNIQUE: Multiplanar multisequential MRI of the brain was acquired without the administration of IV gadolinium.  COMPARISON: Prior CT code stroke series dated 6/12/2024. Prior head CT exam from 3/14/2021.  FINDINGS: The brain parenchyma is normal in signal and morphology. There is no evidence of acute ischemia on the diffusion-weighted images.  Ventricular size and configuration is unremarkable. No abnormal extra axial fluid collections are noted. Flow-voids are noted throughout the major intracranial vessels, on the T2 weighted images, consistent with their patency. The sella turcica and posterior fossa are unremarkable.  The paranasal sinuses and mastoid air cells are clear. Calvarial signal is within normal limits. The orbits appear unremarkable.  IMPRESSION: No acute intracranial hemorrhage or evidence of acute ischemia.  --- End of Report ---      LISETH DUARTE MD; Attending Radiologist This document has been electronically signed. Jun 13 2024 5:03PM [de-identified] : ACC: [id number] EXAM: CT ANGIO BRAIN STROKE PROTC IC ORDERED BY: BRUCE REID  ACC: [id number] EXAM: CT ANGIO NECK STROKE PROTCL IC ORDERED BY: BRUCE REID  ACC: [id number] EXAM: CT BRAIN PERFUSION MAPS STROKE ORDERED BY: BRUCE REID  PROCEDURE DATE: 06/12/2024    INTERPRETATION: CLINICAL INFORMATION: Code stroke. Status post surgery.  TECHNIQUE: 1. Contrast-enhanced CT perfusion was performed. Perfusion maps were automatically generated using [employer] RAPID software. 2. Contrast enhanced CT angiography of the neck and head was performed. MIP reformats were generated.  Intravenous contrast: 130 cc of Omnipaque-350 mg/ml were administered; 70 ccwere discarded. Complications: None reported at time of study completion.  COMPARISON STUDY: None.  FINDINGS: CT PERFUSION: Following measurements were obtained on perfusion maps: Tmax >10s: 0 ml Tmax >8s: 0 ml Tmax >6s: 0 ml Tmax >4s: 0 ml  CBF < 20%: 0 ml CBF < 30%: 0 ml CBF < 34%: 0 ml CBF < 38%: 0 ml  CBV < 34%: 0 ml CBV < 38%: 0 ml CBV < 42%: 0 ml   Core infarct (CBF < 30%:): 0 ml Penumbra (Tmax >6s): 0 ml Mismatched volume: 0 ml Mismatched ratio: None  CT ANGIOGRAPHY NECK: THORACIC AORTA AND BRANCH VESSELS: Three vessel arch. No occlusion, flow limiting stenosis or dissection. RIGHT CAROTID SYSTEM: Patent. No significant atherosclerotic changes, stenosis or dissection. LEFT CAROTID SYSTEM: Patent. No significant atherosclerotic changes, stenosis or dissection. VERTEBRAL ARTERIES: No occlusion, flow-limiting stenosis or dissection. Dominant right vertebral artery with hypoplastic left.  SOFT TISSUES OF THE NECK: Within normal limits. VISUALIZED SPINE: Within normal limits. VISUALIZED UPPER CHEST: Within normal limits  CT ANGIOGRAPHY BRAIN: INTERNAL CAROTID ARTERIES: No flow-limiting stenosis, occlusion or aneurysm. ANTERIOR CEREBRAL ARTERIES: No proximal stenosis, occlusion or aneurysm. MIDDLE CEREBRAL ARTERIES: No proximal stenosis, occlusion or aneurysm. ANTERIOR COMMUNICATING ARTERY: Patent without aneurysm. POSTERIOR COMMUNICATING ARTERIES: Patent bilaterally without aneurysm. POSTERIOR CEREBRAL ARTERIES: No proximal stenosis, occlusion or aneurysm. VERTEBROBASILAR: No flow-limiting stenosis, occlusion or aneurysm. The V4 segment left vertebral artery terminates as the PICA, normal congenital variation in anatomy. Posterior inferior cerebellar arteries, anterior inferior cerebellar arteries and superior cerebellar arteries are patent bilaterally.  VENOUS SINUSES: Superficial and deep venous systems are patent.  IMPRESSION:  CT PERFUSION: No core infarct or penumbra of ischemic tissue is identified by CT perfusion.  CT ANGIOGRAPHY NECK: Patent cervical vasculature. No hemodynamically significant carotid stenosis or flow-limiting vertebral artery stenosis. No evidence of dissection.  CT ANGIOGRAPHY BRAIN: No vessel occlusion, flow-limiting stenosis or aneurysm.  --- End of Report ---      MIHAELA GARCIA MD; Attending Radiologist This document has been electronically signed. Jun 12 2024 11:56PM ACC: [id number]     EXAM:  CT CERVICAL SPINE   ORDERED BY: ABEL TORRES  ACC: [id number]     EXAM:  CT BRAIN   ORDERED BY: ABEL TORRES  PROCEDURE DATE:  07/21/2024    INTERPRETATION:  HISTORY: Left-sided weakness and numbness. HPI Objective Statement: Pt presents to the ED c/o left sided chest pain and left sided numbness on and off for 1 month after hernia surgery x 1 month ago as per Pt. Pt presents with left sided facial droop and left sided weakness which Pt states she had for 1 week. Pt denies N/V, dizziness, fevers and SOB.  Description: A noncontrast head CT and a noncontrast cervical spine CT were performed. The head CT was performed with axial images with coronal and sagittal reformats. The cervical spine CT was performed with axial thin section images with sagittal and coronal reformatted series. 3-D reformats of the cervical spine were also obtained.  Head CT:  Comparison is made to a brain MRI from 06/13/2024, head CT 06/12/2024.  There is no evidence for calvarial fracture, acute hemorrhage, gross loss of the gray matter white matter junction, mass effect, or hydrocephalus.  Scattered punctate brain parenchymal calcifications without surrounding edema are unchanged compared to the previous CT and MRI studies. Sequela from post infectious or post inflammatory calcifications can't be excluded (such as neurocysticercosis).  Cerebral volume loss is noted with secondary proportional prominence of the ventricles and sulci.  Cervical spine CT:  There is no evidence for acute fracture, subluxation, or prevertebral hematoma.  Disc bulges, disc osteophyte complexes, facet degenerative changes, and ligamentum flavum hypertrophy are present. The associated degree of spinal canal stenosis and neural foraminal narrowing is not well evaluated with CT technique.  A nonspecific 1.5 cm right thyroid nodule or cyst is noted. Correlation with outpatient thyroid ultrasound is recommended for further workup.  IMPRESSION:  1. On the head CT, no acute intracranial pathology is noted. If symptoms persist, consider short interval follow-up head CT or brain MRI follow-up if there are no MRI contraindications. 2. On the cervical spine CT, there is no evidence for acute cervical spine fracture. Degenerative changes as described. If symptoms persist, consider cervical spine MRI follow-up if there are no MRI contraindications. 3. A nonspecific 1.5 cm right thyroid nodule or cyst is noted. Correlation with outpatient thyroid ultrasound is recommended for further workup.  --- End of Report ---      NELLI VILLEGAS MD; Attending Radiologist This document has been electronically signed. Jul 21 2024  2:19PM

## 2024-08-28 NOTE — HISTORY OF PRESENT ILLNESS
[FreeTextEntry1] : She was admitted to [facility] for hernia surgery.  Following the hernia surgery she was noticed to have weakness and numbness on the left side and a facial droop.  A stroke code was called.  The CT CTA and MRI scan as well as CT spine were not diagnostic.  She went through physical therapy and feels better now.  But, she has persistent weakness and numbness on the left side arm and leg.

## 2024-08-28 NOTE — HISTORY OF PRESENT ILLNESS
[FreeTextEntry1] : She was admitted to [facility] for hernia surgery.  Following the hernia surgery she was noticed to have weakness and numbness on the left side and a facial droop.  A stroke code was called.  The CT CTA and MRI scan as well as CT spine were not diagnostic.  She went through physical therapy and feels better now.  But, she has persistent weakness and numbness on the left side arm and leg.

## 2024-08-28 NOTE — DISCUSSION/SUMMARY
[FreeTextEntry1] : 1.  To investigate low-grade glioma's that might not be visible on MRI without contrast have requested MRI scan with and without contrast.   2. She has right facial weakness and left hemiparesis the right facial weakness is a central type of weakness which raises the possibility that she has bilateral pyramidal tract type of disorder.  This could indicate a motor neuron disease or autoimmune encephalitis.  Appropriate tests would include nerve conduction velocity examination of spinal fluid examination as well as serological tests.  These have been requested

## 2024-08-28 NOTE — DATA REVIEWED
[de-identified] : ACC: [id number] EXAM: MR BRAIN ORDERED BY: ESTEPHANIA VELEZ DATE: 06/13/2024    INTERPRETATION: .  CLINICAL INFORMATION: Code stroke. Left-sided weakness.  TECHNIQUE: Multiplanar multisequential MRI of the brain was acquired without the administration of IV gadolinium.  COMPARISON: Prior CT code stroke series dated 6/12/2024. Prior head CT exam from 3/14/2021.  FINDINGS: The brain parenchyma is normal in signal and morphology. There is no evidence of acute ischemia on the diffusion-weighted images.  Ventricular size and configuration is unremarkable. No abnormal extra axial fluid collections are noted. Flow-voids are noted throughout the major intracranial vessels, on the T2 weighted images, consistent with their patency. The sella turcica and posterior fossa are unremarkable.  The paranasal sinuses and mastoid air cells are clear. Calvarial signal is within normal limits. The orbits appear unremarkable.  IMPRESSION: No acute intracranial hemorrhage or evidence of acute ischemia.  --- End of Report ---      LISETH DUARTE MD; Attending Radiologist This document has been electronically signed. Jun 13 2024 5:03PM [de-identified] : ACC: [id number] EXAM: CT ANGIO BRAIN STROKE PROTC IC ORDERED BY: BRUCE REID  ACC: [id number] EXAM: CT ANGIO NECK STROKE PROTCL IC ORDERED BY: BRUCE REID  ACC: [id number] EXAM: CT BRAIN PERFUSION MAPS STROKE ORDERED BY: BRUCE REID  PROCEDURE DATE: 06/12/2024    INTERPRETATION: CLINICAL INFORMATION: Code stroke. Status post surgery.  TECHNIQUE: 1. Contrast-enhanced CT perfusion was performed. Perfusion maps were automatically generated using [employer] RAPID software. 2. Contrast enhanced CT angiography of the neck and head was performed. MIP reformats were generated.  Intravenous contrast: 130 cc of Omnipaque-350 mg/ml were administered; 70 ccwere discarded. Complications: None reported at time of study completion.  COMPARISON STUDY: None.  FINDINGS: CT PERFUSION: Following measurements were obtained on perfusion maps: Tmax >10s: 0 ml Tmax >8s: 0 ml Tmax >6s: 0 ml Tmax >4s: 0 ml  CBF < 20%: 0 ml CBF < 30%: 0 ml CBF < 34%: 0 ml CBF < 38%: 0 ml  CBV < 34%: 0 ml CBV < 38%: 0 ml CBV < 42%: 0 ml   Core infarct (CBF < 30%:): 0 ml Penumbra (Tmax >6s): 0 ml Mismatched volume: 0 ml Mismatched ratio: None  CT ANGIOGRAPHY NECK: THORACIC AORTA AND BRANCH VESSELS: Three vessel arch. No occlusion, flow limiting stenosis or dissection. RIGHT CAROTID SYSTEM: Patent. No significant atherosclerotic changes, stenosis or dissection. LEFT CAROTID SYSTEM: Patent. No significant atherosclerotic changes, stenosis or dissection. VERTEBRAL ARTERIES: No occlusion, flow-limiting stenosis or dissection. Dominant right vertebral artery with hypoplastic left.  SOFT TISSUES OF THE NECK: Within normal limits. VISUALIZED SPINE: Within normal limits. VISUALIZED UPPER CHEST: Within normal limits  CT ANGIOGRAPHY BRAIN: INTERNAL CAROTID ARTERIES: No flow-limiting stenosis, occlusion or aneurysm. ANTERIOR CEREBRAL ARTERIES: No proximal stenosis, occlusion or aneurysm. MIDDLE CEREBRAL ARTERIES: No proximal stenosis, occlusion or aneurysm. ANTERIOR COMMUNICATING ARTERY: Patent without aneurysm. POSTERIOR COMMUNICATING ARTERIES: Patent bilaterally without aneurysm. POSTERIOR CEREBRAL ARTERIES: No proximal stenosis, occlusion or aneurysm. VERTEBROBASILAR: No flow-limiting stenosis, occlusion or aneurysm. The V4 segment left vertebral artery terminates as the PICA, normal congenital variation in anatomy. Posterior inferior cerebellar arteries, anterior inferior cerebellar arteries and superior cerebellar arteries are patent bilaterally.  VENOUS SINUSES: Superficial and deep venous systems are patent.  IMPRESSION:  CT PERFUSION: No core infarct or penumbra of ischemic tissue is identified by CT perfusion.  CT ANGIOGRAPHY NECK: Patent cervical vasculature. No hemodynamically significant carotid stenosis or flow-limiting vertebral artery stenosis. No evidence of dissection.  CT ANGIOGRAPHY BRAIN: No vessel occlusion, flow-limiting stenosis or aneurysm.  --- End of Report ---      MIHAELA GARCIA MD; Attending Radiologist This document has been electronically signed. Jun 12 2024 11:56PM ACC: [id number]     EXAM:  CT CERVICAL SPINE   ORDERED BY: ABEL TORRES  ACC: [id number]     EXAM:  CT BRAIN   ORDERED BY: ABEL TORRES  PROCEDURE DATE:  07/21/2024    INTERPRETATION:  HISTORY: Left-sided weakness and numbness. HPI Objective Statement: Pt presents to the ED c/o left sided chest pain and left sided numbness on and off for 1 month after hernia surgery x 1 month ago as per Pt. Pt presents with left sided facial droop and left sided weakness which Pt states she had for 1 week. Pt denies N/V, dizziness, fevers and SOB.  Description: A noncontrast head CT and a noncontrast cervical spine CT were performed. The head CT was performed with axial images with coronal and sagittal reformats. The cervical spine CT was performed with axial thin section images with sagittal and coronal reformatted series. 3-D reformats of the cervical spine were also obtained.  Head CT:  Comparison is made to a brain MRI from 06/13/2024, head CT 06/12/2024.  There is no evidence for calvarial fracture, acute hemorrhage, gross loss of the gray matter white matter junction, mass effect, or hydrocephalus.  Scattered punctate brain parenchymal calcifications without surrounding edema are unchanged compared to the previous CT and MRI studies. Sequela from post infectious or post inflammatory calcifications can't be excluded (such as neurocysticercosis).  Cerebral volume loss is noted with secondary proportional prominence of the ventricles and sulci.  Cervical spine CT:  There is no evidence for acute fracture, subluxation, or prevertebral hematoma.  Disc bulges, disc osteophyte complexes, facet degenerative changes, and ligamentum flavum hypertrophy are present. The associated degree of spinal canal stenosis and neural foraminal narrowing is not well evaluated with CT technique.  A nonspecific 1.5 cm right thyroid nodule or cyst is noted. Correlation with outpatient thyroid ultrasound is recommended for further workup.  IMPRESSION:  1. On the head CT, no acute intracranial pathology is noted. If symptoms persist, consider short interval follow-up head CT or brain MRI follow-up if there are no MRI contraindications. 2. On the cervical spine CT, there is no evidence for acute cervical spine fracture. Degenerative changes as described. If symptoms persist, consider cervical spine MRI follow-up if there are no MRI contraindications. 3. A nonspecific 1.5 cm right thyroid nodule or cyst is noted. Correlation with outpatient thyroid ultrasound is recommended for further workup.  --- End of Report ---      NELLI VILLEGAS MD; Attending Radiologist This document has been electronically signed. Jul 21 2024  2:19PM

## 2024-09-03 ENCOUNTER — OUTPATIENT (OUTPATIENT)
Dept: OUTPATIENT SERVICES | Facility: HOSPITAL | Age: 64
LOS: 1 days | End: 2024-09-03
Payer: MEDICAID

## 2024-09-03 DIAGNOSIS — Z98.890 OTHER SPECIFIED POSTPROCEDURAL STATES: Chronic | ICD-10-CM

## 2024-09-03 DIAGNOSIS — G04.81 OTHER ENCEPHALITIS AND ENCEPHALOMYELITIS: ICD-10-CM

## 2024-09-03 PROCEDURE — 95957 EEG DIGITAL ANALYSIS: CPT

## 2024-09-03 PROCEDURE — 95816 EEG AWAKE AND DROWSY: CPT | Mod: 26

## 2024-09-03 PROCEDURE — 95816 EEG AWAKE AND DROWSY: CPT

## 2024-09-03 NOTE — EEG REPORT - NS EEG TEXT BOX
RAYNA NEGRON [id number]     Study Date: 09-03-24  Duration: 25 min  --------------------------------------------------------------------------------------------------  History:  CC/ HPI Patient is a 64y old  Female who presents with a chief complaint of   MEDICATIONS  (STANDING):    --------------------------------------------------------------------------------------------------  Study Interpretation:    [[[Abbreviation Key:  PDR=alpha rhythm/posterior dominant rhythm. A-P=anterior posterior.  Amplitude: ‘very low’:<20; ‘low’:20-49; ‘medium’:; ‘high’:>150uV.  Persistence for periodic/rhythmic patterns (% of epoch) ‘rare’:<1%; ‘occasional’:1-10%; ‘frequent’:10-50%; ‘abundant’:50-90%; ‘continuous’:>90%.  Persistence for sporadic discharges: ‘rare’:<1/hr; ‘occasional’:1/min-1/hr; ‘frequent’:>1/min; ‘abundant’:>1/10 sec.  RPP=rhythmic and periodic patterns; GRDA=generalized rhythmic delta activity; FIRDA=frontal intermittent GRDA; LRDA=lateralized rhythmic delta activity; TIRDA=temporal intermittent rhythmic delta activity;  LPD=PLED=lateralized periodic discharges; GPD=generalized periodic discharges; BIPDs =bilateral independent periodic discharges; Mf=multifocal; SIRPDs=stimulus induced rhythmic, periodic, or ictal appearing discharges; BIRDs=brief potentially ictal rhythmic discharges >4 Hz, lasting .5-10s; PFA (paroxysmal bursts >13 Hz or =8 Hz <10s).  Modifiers: +F=with fast component; +S=with spike component; +R=with rhythmic component.  S-B=burst suppression pattern.  Max=maximal. N1-drowsy; N2-stage II sleep; N3-slow wave sleep. SSS/BETS=small sharp spikes/benign epileptiform transients of sleep. HV=hyperventilation; PS=photic stimulation]]]    Daily EEG Visual Analysis    FINDINGS:      Background:  Continuity: Continuous  Symmetry: Symmetric  Posterior dominant rhythm (PDR): 10 Hz, reactive to eye closure. Symmetric low-amplitude frontal beta in wakefulness.  Voltage: Normal  Anterior-Posterior Gradient: Present  Other background findings: None  Breach: Absent    Background Slowing:  Generalized slowing: None  Focal slowing: Occasional bilateral independent frontotemporal focal polymorphic delta and theta slowing    State Changes:   Drowsiness is characterized by fragmentation, attenuation, and slowing of the background activity.  Stage 2 sleep is not captured.    Interictal Findings:  None    Electrographic and Electroclinical seizures:  None    Other Clinical Events:  None    Activation Procedures:   Hyperventilation is not performed.    Photic stimulation is not performed.    Artifacts:  Intermittent myogenic and movement artifacts are present.    Single-lead EKG: Regular rhythm    EEG Classification / Summary:  Abnormal routine EEG in the awake, drowsy states.   Occasional bilateral independent frontotemporal focal slowing.  No epileptiform abnormalities are captured.   No seizures.    Clinical Impression:  Bilateral frontotemporal focal cerebral dysfunction can be structural or functional in etiology.   No epileptiform abnormalities or seizures.          -------------------------------------------------------------------------------------------------------    Margie Sweet MD  Attending Physician, [facility]    -------------------------------------------------------------------------------------------------------    To reach EEG technologist:  Please use the pager number for the appropriate hospital or contact the .  At [facility] - Pager #: [phone]    To reach EEG-reading physician:  EEG Reading Room Phone #: [phone]  Epilepsy Answering Service after 5PM and before 8:30AM: Phone #: [phone]

## 2024-09-05 ENCOUNTER — APPOINTMENT (OUTPATIENT)
Dept: MRI IMAGING | Facility: CLINIC | Age: 64
End: 2024-09-05
Payer: MEDICAID

## 2024-09-05 ENCOUNTER — OUTPATIENT (OUTPATIENT)
Dept: OUTPATIENT SERVICES | Facility: HOSPITAL | Age: 64
LOS: 1 days | End: 2024-09-05

## 2024-09-05 DIAGNOSIS — Z98.890 OTHER SPECIFIED POSTPROCEDURAL STATES: Chronic | ICD-10-CM

## 2024-09-05 PROCEDURE — 72156 MRI NECK SPINE W/O & W/DYE: CPT | Mod: 26

## 2024-09-05 PROCEDURE — 70553 MRI BRAIN STEM W/O & W/DYE: CPT | Mod: 26

## 2024-09-06 DIAGNOSIS — G44.59 OTHER COMPLICATED HEADACHE SYNDROME: ICD-10-CM

## 2024-09-06 LAB
ANACR T: NEGATIVE
ENA SS-A AB SER IA-ACNC: <0.2 AL
ENA SS-B AB SER IA-ACNC: <0.2 AL
ERYTHROCYTE [SEDIMENTATION RATE] IN BLOOD BY WESTERGREN METHOD: 5 MM/HR
FOLATE SERPL-MCNC: 6.4 NG/ML
VIT B12 SERPL-MCNC: >2000 PG/ML

## 2024-09-07 LAB
AMPA-R ABCBA: NEGATIVE
AMPHIPHYSIN IGG TITR SER IF: NEGATIVE
ANNOTATION COMMENT IMP: NORMAL
CASPR2-IGG CBA, S: NEGATIVE
CV2 IGG TITR SER: NEGATIVE
GABA-B ABCBA: NEGATIVE
GAD65 AB SER-MCNC: 0 NMOL/L
GFAP IFA, S: NEGATIVE
GLIAL NUC TYPE 1 AB TITR SER: NEGATIVE
HU1 AB TITR SER: NEGATIVE
HU2 AB TITR SER IF: NEGATIVE
HU3 AB TITR SER: NEGATIVE
IMMUNOLOGIST REVIEW: NORMAL
LGI1-IGG CBA, S: NEGATIVE
MGLUR1 AB IFA, S: NEGATIVE
NEUROCHONDRIN-IFA, SERUM: NEGATIVE
NIF IFA, S: NEGATIVE
NMDA-R ABCBA: NEGATIVE
PCA-1 AB TITR SER: NEGATIVE
PCA-2 AB TITR SER: NEGATIVE
PCA-TR AB TITR SER: NEGATIVE

## 2024-09-09 RX ORDER — TOPIRAMATE 25 MG/1
25 TABLET, FILM COATED ORAL
Qty: 60 | Refills: 3 | Status: ACTIVE | COMMUNITY
Start: 2024-09-06 | End: 1900-01-01

## 2024-10-02 ENCOUNTER — NON-APPOINTMENT (OUTPATIENT)
Age: 64
End: 2024-10-02

## 2024-10-10 ENCOUNTER — APPOINTMENT (OUTPATIENT)
Dept: SURGERY | Facility: CLINIC | Age: 64
End: 2024-10-10
Payer: MEDICAID

## 2024-10-10 VITALS
BODY MASS INDEX: 28.53 KG/M2 | HEIGHT: 63 IN | DIASTOLIC BLOOD PRESSURE: 76 MMHG | HEART RATE: 83 BPM | SYSTOLIC BLOOD PRESSURE: 129 MMHG | OXYGEN SATURATION: 95 % | WEIGHT: 161 LBS

## 2024-10-10 DIAGNOSIS — K42.9 UMBILICAL HERNIA W/OUT OBSTRUCTION OR GANGRENE: ICD-10-CM

## 2024-10-10 PROCEDURE — 99213 OFFICE O/P EST LOW 20 MIN: CPT

## 2024-11-11 ENCOUNTER — APPOINTMENT (OUTPATIENT)
Dept: NEUROLOGY | Facility: CLINIC | Age: 64
End: 2024-11-11

## 2024-11-13 ENCOUNTER — APPOINTMENT (OUTPATIENT)
Dept: NEUROLOGY | Facility: CLINIC | Age: 64
End: 2024-11-13
Payer: MEDICAID

## 2024-11-13 VITALS
TEMPERATURE: 96.3 F | HEART RATE: 75 BPM | OXYGEN SATURATION: 97 % | DIASTOLIC BLOOD PRESSURE: 76 MMHG | WEIGHT: 161 LBS | SYSTOLIC BLOOD PRESSURE: 137 MMHG | HEIGHT: 63 IN | BODY MASS INDEX: 28.53 KG/M2

## 2024-11-13 PROCEDURE — 99214 OFFICE O/P EST MOD 30 MIN: CPT

## 2024-11-13 PROCEDURE — 95886 MUSC TEST DONE W/N TEST COMP: CPT

## 2024-11-13 PROCEDURE — 95911 NRV CNDJ TEST 9-10 STUDIES: CPT

## 2024-11-18 LAB — HTLV I+II AB SER QL: NEGATIVE

## 2025-03-10 ENCOUNTER — APPOINTMENT (OUTPATIENT)
Dept: NEUROLOGY | Facility: CLINIC | Age: 65
End: 2025-03-10

## 2025-03-13 ENCOUNTER — APPOINTMENT (OUTPATIENT)
Dept: NEUROLOGY | Facility: CLINIC | Age: 65
End: 2025-03-13
Payer: MEDICARE

## 2025-03-13 ENCOUNTER — NON-APPOINTMENT (OUTPATIENT)
Age: 65
End: 2025-03-13

## 2025-03-13 VITALS
WEIGHT: 170 LBS | SYSTOLIC BLOOD PRESSURE: 161 MMHG | DIASTOLIC BLOOD PRESSURE: 79 MMHG | HEIGHT: 63 IN | TEMPERATURE: 98.3 F | HEART RATE: 64 BPM | BODY MASS INDEX: 30.12 KG/M2 | OXYGEN SATURATION: 98 %

## 2025-03-13 PROCEDURE — 99205 OFFICE O/P NEW HI 60 MIN: CPT

## 2025-03-13 RX ORDER — NORTRIPTYLINE HYDROCHLORIDE 10 MG/1
10 CAPSULE ORAL
Qty: 180 | Refills: 3 | Status: ACTIVE | COMMUNITY
Start: 2025-03-13 | End: 1900-01-01

## 2025-03-13 RX ORDER — PROPRANOLOL HYDROCHLORIDE 80 MG/1
80 CAPSULE, EXTENDED RELEASE ORAL DAILY
Qty: 90 | Refills: 3 | Status: ACTIVE | COMMUNITY
Start: 2025-03-13 | End: 1900-01-01

## 2025-03-28 NOTE — ED PROVIDER NOTE - CROS ED CARDIOVAS ALL NEG
- - - pt seen bedside on RA, alert with periods of confusion. pt responded to simple questions for assessment, he verbalized wants/needs and was able to follow simple 1 step directives given max multimodality cues. PCA Mague bedside who assisted during evaluation d/t good rapport with pt.      CT head 3/25/2025 IMPRESSION: Degraded by motion. No evidence of an acute intracranial hemorrhage, midline shift, or hydrocephalus. Atrophy with confluent white matter ischemic changes. No significant interval change from prior exam.    Xray chest 3/23/2025 IMPRESSION: Loop recorder reidentified. Low lung volumes with marked asymmetric elevation left hemidiaphragm similar to prior linear atelectasis left base. No consolidation pneumothorax or effusion. distended bowel left upper quadrant similar to prior.

## 2025-06-20 ENCOUNTER — NON-APPOINTMENT (OUTPATIENT)
Age: 65
End: 2025-06-20

## 2025-06-20 ENCOUNTER — APPOINTMENT (OUTPATIENT)
Dept: NEUROLOGY | Facility: CLINIC | Age: 65
End: 2025-06-20

## 2025-06-20 VITALS
BODY MASS INDEX: 30.12 KG/M2 | WEIGHT: 170 LBS | HEIGHT: 63 IN | HEART RATE: 82 BPM | SYSTOLIC BLOOD PRESSURE: 135 MMHG | TEMPERATURE: 98 F | OXYGEN SATURATION: 98 % | DIASTOLIC BLOOD PRESSURE: 81 MMHG

## 2025-06-20 PROCEDURE — 99215 OFFICE O/P EST HI 40 MIN: CPT

## 2025-06-20 RX ORDER — UBROGEPANT 100 MG/1
100 TABLET ORAL
Qty: 2 | Refills: 0 | Status: ACTIVE | OUTPATIENT
Start: 2025-06-20

## 2025-06-20 RX ORDER — INDOMETHACIN 25 MG/1
25 CAPSULE ORAL 3 TIMES DAILY
Qty: 50 | Refills: 3 | Status: ACTIVE | COMMUNITY
Start: 2025-06-20 | End: 1900-01-01

## 2025-06-20 RX ORDER — OMEPRAZOLE 20 MG/1
20 CAPSULE, DELAYED RELEASE ORAL DAILY
Qty: 5 | Refills: 0 | Status: ACTIVE | COMMUNITY
Start: 2025-06-20 | End: 1900-01-01

## 2025-06-20 RX ORDER — VERAPAMIL HYDROCHLORIDE 180 MG/1
180 CAPSULE, DELAYED RELEASE ORAL
Qty: 90 | Refills: 3 | Status: ACTIVE | COMMUNITY
Start: 2025-06-20 | End: 1900-01-01

## 2025-06-20 RX ORDER — VALSARTAN 160 MG/1
160 TABLET ORAL
Refills: 0 | Status: ACTIVE | COMMUNITY

## 2025-06-20 RX ORDER — PREDNISONE 20 MG/1
20 TABLET ORAL
Qty: 13 | Refills: 0 | Status: ACTIVE | COMMUNITY
Start: 2025-06-20 | End: 1900-01-01

## 2025-06-27 RX ORDER — ONABOTULINUMTOXINA 200 [USP'U]/1
200 INJECTION, POWDER, LYOPHILIZED, FOR SOLUTION INTRADERMAL; INTRAMUSCULAR
Qty: 1 | Refills: 3 | Status: ACTIVE | COMMUNITY
Start: 2025-06-20 | End: 1900-01-01

## (undated) DEVICE — NDL HYPO SAFE 25G X 1.5" (ORANGE)

## (undated) DEVICE — DRAPE LAPAROTOMY W POUCHES

## (undated) DEVICE — ELCTR GROUNDING PAD ADULT COVIDIEN

## (undated) DEVICE — PACK MINOR NO DRAPE

## (undated) DEVICE — GLV 7 PROTEXIS (WHITE)

## (undated) DEVICE — GLV 7.5 PROTEXIS (WHITE)

## (undated) DEVICE — DRSG MASTISOL

## (undated) DEVICE — PREP CHLORAPREP HI-LITE ORANGE 26ML

## (undated) DEVICE — WARMING BLANKET UPPER ADULT

## (undated) DEVICE — DRAPE 1/2 SHEET 40X57"

## (undated) DEVICE — FOR-ESU VALLEYLAB T7E14842DX: Type: DURABLE MEDICAL EQUIPMENT

## (undated) DEVICE — SUT POLYSORB 4-0 27" P-12 UNDYED

## (undated) DEVICE — SUT POLYSORB 0 36" GS-25

## (undated) DEVICE — DRAPE LIGHT HANDLE COVER (BLUE)

## (undated) DEVICE — VENODYNE/SCD SLEEVE CALF MEDIUM

## (undated) DEVICE — SUT POLYSORB 3-0 36" GS-21

## (undated) DEVICE — SOL IRR POUR NS 0.9% 1500ML

## (undated) DEVICE — SUT SURGIPRO II 1 30" GS-25